# Patient Record
Sex: FEMALE | Race: WHITE | NOT HISPANIC OR LATINO | Employment: UNEMPLOYED | URBAN - METROPOLITAN AREA
[De-identification: names, ages, dates, MRNs, and addresses within clinical notes are randomized per-mention and may not be internally consistent; named-entity substitution may affect disease eponyms.]

---

## 2017-08-15 ENCOUNTER — ALLSCRIPTS OFFICE VISIT (OUTPATIENT)
Dept: OTHER | Facility: OTHER | Age: 22
End: 2017-08-15

## 2017-08-15 PROCEDURE — G0145 SCR C/V CYTO,THINLAYER,RESCR: HCPCS | Performed by: NURSE PRACTITIONER

## 2017-08-17 ENCOUNTER — GENERIC CONVERSION - ENCOUNTER (OUTPATIENT)
Dept: OTHER | Facility: OTHER | Age: 22
End: 2017-08-17

## 2017-08-17 ENCOUNTER — LAB REQUISITION (OUTPATIENT)
Dept: LAB | Facility: HOSPITAL | Age: 22
End: 2017-08-17
Payer: COMMERCIAL

## 2017-08-17 DIAGNOSIS — Z01.419 ENCOUNTER FOR GYNECOLOGICAL EXAMINATION WITHOUT ABNORMAL FINDING: ICD-10-CM

## 2017-08-17 DIAGNOSIS — Z11.3 ENCOUNTER FOR SCREENING FOR INFECTIONS WITH PREDOMINANTLY SEXUAL MODE OF TRANSMISSION: ICD-10-CM

## 2017-08-17 PROCEDURE — 87591 N.GONORRHOEAE DNA AMP PROB: CPT | Performed by: NURSE PRACTITIONER

## 2017-08-17 PROCEDURE — 87491 CHLMYD TRACH DNA AMP PROBE: CPT | Performed by: NURSE PRACTITIONER

## 2017-08-18 LAB
CHLAMYDIA DNA CVX QL NAA+PROBE: NORMAL
N GONORRHOEA DNA GENITAL QL NAA+PROBE: NORMAL

## 2017-08-23 LAB
LAB AP GYN PRIMARY INTERPRETATION: NORMAL
Lab: NORMAL
PATH INTERP SPEC-IMP: NORMAL

## 2017-08-24 ENCOUNTER — ALLSCRIPTS OFFICE VISIT (OUTPATIENT)
Dept: OTHER | Facility: OTHER | Age: 22
End: 2017-08-24

## 2017-10-23 NOTE — PROGRESS NOTES
Assessment  1  Encounter for gynecological examination with Papanicolaou smear of cervix (V72 31)   (Z01 419)    Plan  Birth control    · Junel FE 1/20 1-20 MG-MCG Oral Tablet; TAKE 1 TABLET DAILY   Rx By: Preethi Izquierdo; Dispense: 28 Days ; #:28 Tablet; Refill: 0;For: Birth control; DAPHNEY = N; Verified Transmission to [place] [address]; Last Updated By: System, [employer]; 8/15/2017 4:38:32 PM   · Junel FE 1/20 1-20 MG-MCG Oral Tablet; TAKE 1 TABLET DAILY AS DIRECTED   Rx By: Preethi Izquierdo; Dispense: 84 Days ; #:84 Tablet; Refill: 3;For: Birth control; DAPHNEY = N; Verified Transmission to [facility]; Last Updated By: System, [employer]; 8/15/2017 4:38:36 PM    Discussion/Summary  health maintenance visit Currently, she eats a healthy diet and has an adequate exercise regimen  the risks and benefits of cervical cancer screening were discussed Pap test with reflex HPV testing was done today Testing was done today for chlamydia and gonorrhea  Breast cancer screening: the risks and benefits of breast cancer screening were discussed and self breast exam technique was taught  Advice and education were given regarding aerobic exercise, weight bearing exercise, calcium supplements, vitamin D supplements and contraception  Patient discussion: discussed with the patient  Normal gyn exam  pap and g/c, c/t cultures sent  Given rx for Junel 1/20  Will call me in three months to see if wants to continue same ocp  To call sooner w any questions/concerns  rto yearly  The patient has the current Goals: Annual and ocp start  The patent has the current Barriers: None  Patient is able to Self-Care  Possible side effects of new medications were reviewed with the patient/guardian today  The treatment plan was reviewed with the patient/guardian   The patient/guardian understands and agrees with the treatment plan     Self Referrals: Yes      Chief Complaint  Yearly      History of Present Illness  HPI: 24 yo new pt here for annual exam  Used junel in the past and would like to restart it  No pelvic or breasts c/o  SH: was working in [employer], recently came back and going to [facility]   GYN HM, Adult [facility]: The patient is being seen for a gynecology evaluation  General Health:   Lifestyle:  She does not use tobacco --b0She consumes alcohol --b0She denies drug use  Reproductive health: the patient is premenopausalshe reports no menstrual problems  --b0she uses no contraception --b0she is sexually active  --b0she denies prior pregnancies  Screening:      Review of Systems  no pelvic pain,--b0no pelvic pressure,--b0no vaginal discharge,--b0no vaginal itching,--b0no nonmenstrual bleeding,--b0no vulvar pain,--b0no vulvar itching,--b0no dysmenorrhea,--[id number] length of periods,--b0no dysuriano urinary loss of control  Constitutional: No fever, no chills, feels well, no tiredness, no recent weight gain or loss  ENT: no ear ache, no loss of hearing, no nosebleeds or nasal discharge, no sore throat or hoarseness  Cardiovascular: no complaints of slow or fast heart rate, no chest pain, no palpitations, no leg claudication or lower extremity edema  Respiratory: no complaints of shortness of breath, no wheezing, no dyspnea on exertion, no orthopnea or PND  Breasts: no complaints of breast pain, breast lump or nipple discharge  Gastrointestinal: no complaints of abdominal pain, no constipation, no nausea or diarrhea, no vomiting, no bloody stools  Genitourinary: no complaints of dysuria, no incontinence, no pelvic pain, no dysmenorrhea, no vaginal discharge or abnormal vaginal bleeding  Musculoskeletal: no complaints of arthralgia, no myalgia, no joint swelling or stiffness, no limb pain or swelling  Integumentary: no complaints of skin rash or lesion, no itching or dry skin, no skin wounds     Neurological: no complaints of headache, no confusion, no numbness or tingling, no dizziness or fainting  ROS reviewed  OB History  Pregnancy History (Brief):   Prior pregnancies: : 0  Para: Active Problems  1  Acute pansinusitis (461 8) (J01 40)   2  Allergic dermatitis (692 9) (L23 9)   3  Allergy To Pollens (V15 09)   4  Asthma (493 90) (J45 909)   5  Hives (708 9) (L50 9)   6  Sore throat (462) (J02 9)    Past Medical History   · No pertinent past medical history   · History of No pertinent past surgical history    The active problems and past medical history were reviewed and updated today  Surgical History   · History Of Prior Surgery    The surgical history was reviewed and updated today  Family History  Mother    · History of allergy (V15 09) (Z88 9)    The family history was reviewed and updated today  Social History   · Never a smoker   · Never A Smoker   · Never Drank Alcohol  The social history was reviewed and updated today  The social history was reviewed and is unchanged  Current Meds   1  Loratadine 10 MG Oral Tablet; Take 1 tablet daily Recorded   2  Loratadine TABS; Therapy: (Recorded:[id number]) to Recorded   3  MethylPREDNISolone (Edvin) 4 MG TABS; TAKE AS DIRECTED; Therapy: 95SOY6174 to (Last KD:95TDH7933)  Requested for: 37OAV5725 Ordered   4  Montelukast Sodium 10 MG Oral Tablet; TAKE 1 TABLET BY MOUTH ONCE DAILY; Therapy: 78GKJ2481 to (Evaluate:01Zgs4811)  Requested for: 96URL3836; Last   QW:[id number] Ordered   5  Xopenex HFA 45 MCG/ACT Inhalation Aerosol; INHALE 1 TO 2 PUFFS EVERY 4 TO 6   HOURS AS NEEDED AND AS DIRECTED; Therapy: 48AKS4478 to (Evaluate:86Hsx7984)  Requested for: 95ZML1166; Last   Rx:[id number] Ordered    Allergies  1  No Known Drug Allergies  2  Apples   3  Seasonal   4  Shellfish    Vitals   Recorded: 02XEE4996 03:42UJ   Systolic 109   Diastolic 70   Weight 801 lb 5 oz   LMP [id number]     Physical Exam    Constitutional   General appearance: No acute distress, well appearing and well nourished      Neck Neck: Normal, supple, trachea midline, no masses  Thyroid: Normal, no thyromegaly  Pulmonary   Respiratory effort: No increased work of breathing or signs of respiratory distress  Auscultation of lungs: Clear to auscultation  Cardiovascular   Auscultation of heart: Normal rate and rhythm, normal S1 and S2, no murmurs  Genitourinary   External genitalia: Normal and no lesions appreciated  Vagina: Normal, no lesions or dryness appreciated  Urethra: Normal     Urethral meatus: Normal     Bladder: Normal, soft, non-tender and no prolapse or masses appreciated  Cervix: Normal, no palpable masses  Uterus: Normal, non-tender, not enlarged, and no palpable masses  Adnexa/parametria: Normal, non-tender and no fullness or masses appreciated  Chest   Breasts: Normal and no dimpling or skin changes noted  Abdomen   Abdomen: Normal, non-tender, and no organomegaly noted  Liver and spleen: No hepatomegaly or splenomegaly  Examination for hernias: No hernias appreciated  Lymphatic   Palpation of lymph nodes in neck, axillae, groin and/or other locations: No lymphadenopathy or masses noted  Skin   Skin and subcutaneous tissue: Normal skin turgor and no rashes  Palpation of skin and subcutaneous tissue: Normal     Psychiatric   Orientation to person, place, and time: Normal     Mood and affect: Normal        Signatures   Electronically signed by : PETE Pulido;  Aug 15 2017  4:44PM EST                       (Author)    Electronically signed by : MARGOTH Rocha ; Aug 17 2017  4:14PM EST

## 2018-01-12 VITALS — DIASTOLIC BLOOD PRESSURE: 70 MMHG | WEIGHT: 185.31 LBS | SYSTOLIC BLOOD PRESSURE: 118 MMHG

## 2018-01-12 VITALS
DIASTOLIC BLOOD PRESSURE: 66 MMHG | TEMPERATURE: 98.4 F | HEIGHT: 65 IN | BODY MASS INDEX: 30.18 KG/M2 | WEIGHT: 181.13 LBS | SYSTOLIC BLOOD PRESSURE: 122 MMHG

## 2018-01-23 ENCOUNTER — ALLSCRIPTS OFFICE VISIT (OUTPATIENT)
Dept: URGENT CARE | Facility: CLINIC | Age: 23
End: 2018-01-23
Payer: COMMERCIAL

## 2018-01-23 LAB
BILIRUB UR QL STRIP: NORMAL
CLARITY UR: NORMAL
COLOR UR: NORMAL
GLUCOSE (HISTORICAL): NORMAL
HGB UR QL STRIP.AUTO: NORMAL
KETONES UR STRIP-MCNC: NORMAL MG/DL
LEUKOCYTE ESTERASE UR QL STRIP: NORMAL
NITRITE UR QL STRIP: NORMAL
PH UR STRIP.AUTO: 6.5 [PH]
PROT UR STRIP-MCNC: 100 MG/DL
SP GR UR STRIP.AUTO: 1.02
UROBILINOGEN UR QL STRIP.AUTO: 0.2

## 2018-01-23 PROCEDURE — 99213 OFFICE O/P EST LOW 20 MIN: CPT

## 2018-02-20 ENCOUNTER — TELEPHONE (OUTPATIENT)
Dept: FAMILY MEDICINE CLINIC | Facility: CLINIC | Age: 23
End: 2018-02-20

## 2018-02-20 DIAGNOSIS — J02.0 ACUTE STREPTOCOCCAL PHARYNGITIS: Primary | ICD-10-CM

## 2018-02-20 RX ORDER — AMOXICILLIN 500 MG/1
1000 CAPSULE ORAL EVERY 12 HOURS SCHEDULED
Qty: 40 CAPSULE | Refills: 0 | Status: SHIPPED | OUTPATIENT
Start: 2018-02-20 | End: 2018-03-02

## 2018-04-04 ENCOUNTER — OFFICE VISIT (OUTPATIENT)
Dept: URGENT CARE | Facility: CLINIC | Age: 23
End: 2018-04-04
Payer: COMMERCIAL

## 2018-04-04 VITALS
BODY MASS INDEX: 33.46 KG/M2 | SYSTOLIC BLOOD PRESSURE: 110 MMHG | HEART RATE: 73 BPM | TEMPERATURE: 99.4 F | WEIGHT: 198 LBS | OXYGEN SATURATION: 98 % | DIASTOLIC BLOOD PRESSURE: 82 MMHG

## 2018-04-04 DIAGNOSIS — J06.9 ACUTE URI: Primary | ICD-10-CM

## 2018-04-04 DIAGNOSIS — R68.89 FLU-LIKE SYMPTOMS: ICD-10-CM

## 2018-04-04 PROCEDURE — 99213 OFFICE O/P EST LOW 20 MIN: CPT | Performed by: PHYSICIAN ASSISTANT

## 2018-04-04 RX ORDER — MONTELUKAST SODIUM 10 MG/1
1 TABLET ORAL DAILY
COMMUNITY
Start: 2014-05-02 | End: 2019-07-09 | Stop reason: ALTCHOICE

## 2018-04-04 RX ORDER — NORETHINDRONE ACETATE AND ETHINYL ESTRADIOL 1MG-20(21)
1 KIT ORAL DAILY
COMMUNITY
Start: 2017-08-15 | End: 2019-07-09 | Stop reason: ALTCHOICE

## 2018-04-04 NOTE — PATIENT INSTRUCTIONS
Infection appears viral   Recommend symptomatic treatment  Can take ibuprofen or tylenol as needed for pain or fever  Over the counter cough and cold medications to help with symptoms  Use salt water gargles for sore throat and throat lozenges  Cough drops as needed  Wash hands frequently to prevent the spread of infection  If not improving over the next 7-10 days, follow up with PCP  Symptoms may persist for 10-14 days  Drink plenty of water

## 2018-04-04 NOTE — LETTER
April 4, 2018     Patient: Jonah Best   YOB: 1995   Date of Visit: 4/4/2018       To Whom It May Concern: It is my medical opinion that Jonah Best can return to work when she no longer has a fever  If you have any questions or concerns, please don't hesitate to call           Sincerely,        Kameron Cai PA-C    CC: No Recipients

## 2018-04-04 NOTE — PROGRESS NOTES
[facility]    NAME: Nolberto Agudelo is a 26 y o  female  : 1995    MRN: [id number]  DATE: 2018  TIME: 3:30 PM    Assessment and Plan   Acute URI [J06 9]  1  Acute URI     2  Flu-like symptoms      Discussed risks and benefits of starting Tamiflu and decided not to start Tamiflu  Patient Instructions     Patient Instructions   Infection appears viral   Recommend symptomatic treatment  Can take ibuprofen or tylenol as needed for pain or fever  Over the counter cough and cold medications to help with symptoms  Use salt water gargles for sore throat and throat lozenges  Cough drops as needed  Wash hands frequently to prevent the spread of infection  If not improving over the next 7-10 days, follow up with PCP  Symptoms may persist for 10-14 days  Drink plenty of water  Chief Complaint     Chief Complaint   Patient presents with    Cold Like Symptoms     Started yesterday with fever headache sore throat ear pressure having dizziness also  no coughing  Is taking OTC medications with little relief  Mikey Piles LPN        History of Present Illness   56-year-old female here with flu-like symptoms for the last 2 days  Reports having a lot of sinus pressure and headaches  Has a really bad sore throat  No cough  Has had a fever as high as 104  Fever was reduced with taking ibuprofen  Patient denies any wheezing or shortness of breath  Review of Systems   Review of Systems   Constitutional: Positive for chills, fatigue and fever  Negative for activity change, appetite change, diaphoresis and unexpected weight change  HENT: Positive for congestion, sinus pressure and sore throat  Negative for dental problem, hearing loss, sneezing, tinnitus, trouble swallowing and voice change  Eyes: Negative for photophobia, redness and visual disturbance  Respiratory: Negative for apnea, cough, chest tightness, shortness of breath, wheezing and stridor      Cardiovascular: Negative for chest pain, palpitations and leg swelling  Gastrointestinal: Negative for abdominal distention, abdominal pain, blood in stool, constipation, diarrhea, nausea and vomiting  Endocrine: Negative for cold intolerance, heat intolerance, polydipsia, polyphagia and polyuria  Genitourinary: Negative for difficulty urinating, dysuria, flank pain, frequency, hematuria and urgency  Musculoskeletal: Negative for arthralgias, back pain, gait problem, joint swelling, myalgias, neck pain and neck stiffness  Skin: Negative for pallor, rash and wound  Neurological: Negative for dizziness, tremors, seizures, speech difficulty, weakness and headaches  Hematological: Negative for adenopathy  Does not bruise/bleed easily  Psychiatric/Behavioral: Negative for agitation, confusion, dysphoric mood and sleep disturbance  The patient is not nervous/anxious  All other systems reviewed and are negative  Current Medications     Current Outpatient Prescriptions:     montelukast (SINGULAIR) 10 mg tablet, Take 1 tablet by mouth daily, Disp: , Rfl:     norethindrone-ethinyl estradiol (JUNEL FE 1/20) 1-20 MG-MCG per tablet, Take 1 tablet by mouth daily, Disp: , Rfl:     Current Allergies     Allergies as of 04/04/2018 - Reviewed 04/04/2018   Allergen Reaction Noted    Apple  05/12/2014    Shellfish allergy  05/12/2014          The following portions of the patient's history were reviewed and updated as appropriate: allergies, current medications, past family history, past medical history, past social history, past surgical history and problem list    No past medical history on file  No past surgical history on file  No family history on file  Medications have been verified  Objective   /82   Pulse 73   Temp 99 4 °F (37 4 °C)   Wt 89 8 kg (198 lb)   SpO2 98%   BMI 33 46 kg/m²      Physical Exam   Physical Exam   Constitutional: She appears well-developed and well-nourished  No distress  HENT:   Head: Normocephalic  Right Ear: Tympanic membrane and external ear normal    Left Ear: Tympanic membrane and external ear normal    Nose: Mucosal edema present  Mouth/Throat: Posterior oropharyngeal erythema present  No oropharyngeal exudate  Neck: Normal range of motion  Neck supple  Cardiovascular: Normal rate, regular rhythm and normal heart sounds  No murmur heard  Pulmonary/Chest: Effort normal and breath sounds normal  No respiratory distress  She has no wheezes  She has no rales  Abdominal: Soft  Bowel sounds are normal  There is no tenderness  Musculoskeletal: Normal range of motion  Lymphadenopathy:     She has no cervical adenopathy  Skin: Skin is warm  No rash noted

## 2018-09-26 RX ORDER — NORETHINDRONE ACETATE AND ETHINYL ESTRADIOL 1MG-20(21)
KIT ORAL
Qty: 84 TABLET | Refills: 3 | OUTPATIENT
Start: 2018-09-26

## 2019-07-09 ENCOUNTER — OFFICE VISIT (OUTPATIENT)
Dept: FAMILY MEDICINE CLINIC | Facility: CLINIC | Age: 24
End: 2019-07-09
Payer: COMMERCIAL

## 2019-07-09 VITALS
HEIGHT: 65 IN | BODY MASS INDEX: 34.99 KG/M2 | SYSTOLIC BLOOD PRESSURE: 132 MMHG | DIASTOLIC BLOOD PRESSURE: 86 MMHG | WEIGHT: 210 LBS

## 2019-07-09 DIAGNOSIS — Z91.018 ALLERGY TO NUTS: Primary | ICD-10-CM

## 2019-07-09 PROCEDURE — 99213 OFFICE O/P EST LOW 20 MIN: CPT | Performed by: FAMILY MEDICINE

## 2019-07-09 PROCEDURE — 3008F BODY MASS INDEX DOCD: CPT | Performed by: FAMILY MEDICINE

## 2019-07-09 RX ORDER — EPINEPHRINE 0.3 MG/.3ML
0.3 INJECTION SUBCUTANEOUS ONCE
Qty: 0.6 ML | Refills: 5 | Status: SHIPPED | OUTPATIENT
Start: 2019-07-09 | End: 2021-12-22

## 2019-07-09 RX ORDER — PREDNISONE 20 MG/1
TABLET ORAL
Qty: 6 TABLET | Refills: 3 | Status: SHIPPED | OUTPATIENT
Start: 2019-07-09 | End: 2020-04-21 | Stop reason: ALTCHOICE

## 2019-07-09 RX ORDER — DIPHENHYDRAMINE HYDROCHLORIDE 12.5 MG/1
BAR, CHEWABLE ORAL
Qty: 15 TABLET | Refills: 3 | Status: SHIPPED | OUTPATIENT
Start: 2019-07-09

## 2019-07-09 NOTE — PROGRESS NOTES
Assessment/Plan:  Patient will be provided with an EpiPen also with on Benadryl and with a steroid rescue pack she will be referred to Allergy and immunology for testing patient was counseled that if this happens again she is not to stay home she is to immediately administer the rescue pack and go to the closest emergency room    Problem List Items Addressed This Visit     None      Visit Diagnoses     Allergy to nuts    -  Primary           Diagnoses and all orders for this visit:    Allergy to nuts        No problem-specific Assessment & Plan notes found for this encounter  Subjective:      Patient ID: Keiko Dorado is a 22 y o  female  Patient he was camping with her parents consumed all men's and peanuts and had episode of swelling of her lip tightness in her throat on took Benadryl on and did not seek immediate medical attention is here now for follow-up this is the 1st episode she has had related to a not allergy      The following portions of the patient's history were reviewed and updated as appropriate:   She has no past medical history on file  ,  does not have a problem list on file  ,   has no past surgical history on file  ,  family history includes No Known Problems in her mother  ,   reports that she has never smoked  She has never used smokeless tobacco  She reports that she drinks alcohol  She reports that she does not use drugs  ,  is allergic to other; apple; and shellfish allergy     No current outpatient medications on file  No current facility-administered medications for this visit  Review of Systems   Constitutional: Negative for activity change, appetite change, diaphoresis, fatigue and fever  HENT: Negative  Eyes: Negative  Respiratory: Negative for apnea, cough, chest tightness, shortness of breath and wheezing  Cardiovascular: Negative for chest pain, palpitations and leg swelling     Gastrointestinal: Negative for abdominal distention, abdominal pain, anal bleeding, constipation, diarrhea, nausea and vomiting  Endocrine: Negative for cold intolerance, heat intolerance, polydipsia, polyphagia and polyuria  Genitourinary: Negative for difficulty urinating, dysuria, flank pain, hematuria and urgency  Musculoskeletal: Negative for arthralgias, back pain, gait problem, joint swelling and myalgias  Skin: Negative for color change, rash and wound  Allergic/Immunologic: Negative for environmental allergies, food allergies and immunocompromised state  Patient had an episode of not allergy swelling of lip tightness in throat she has previously had issues with crush stations and with apples   Neurological: Negative for dizziness, seizures, syncope, speech difficulty, numbness and headaches  Hematological: Negative for adenopathy  Does not bruise/bleed easily  Psychiatric/Behavioral: Negative for agitation, behavioral problems, hallucinations, sleep disturbance and suicidal ideas  Objective:  Vitals:    07/09/19 1652   BP: 132/86   BP Location: Left arm   Patient Position: Sitting   Cuff Size: Standard   Weight: 95 3 kg (210 lb)   Height: 5' 4 5" (1 638 m)     Body mass index is 35 49 kg/m²  Physical Exam   Constitutional: She is oriented to person, place, and time  She appears well-developed and well-nourished  No distress  HENT:   Head: Normocephalic  Right Ear: External ear normal    Left Ear: External ear normal    Nose: Nose normal    Mouth/Throat: Oropharynx is clear and moist    Eyes: Pupils are equal, round, and reactive to light  Conjunctivae and EOM are normal  Right eye exhibits no discharge  Left eye exhibits no discharge  No scleral icterus  Neck: Normal range of motion  No tracheal deviation present  No thyromegaly present  Cardiovascular: Normal rate, regular rhythm and normal heart sounds  Exam reveals no gallop and no friction rub  No murmur heard    Pulmonary/Chest: Effort normal and breath sounds normal  No respiratory distress  She has no wheezes  Abdominal: Soft  Bowel sounds are normal  She exhibits no mass  There is no tenderness  There is no guarding  Musculoskeletal: She exhibits no edema or deformity  Lymphadenopathy:     She has no cervical adenopathy  Neurological: She is alert and oriented to person, place, and time  No cranial nerve deficit  Skin: Skin is warm and dry  No rash noted  She is not diaphoretic  No erythema  Psychiatric: She has a normal mood and affect   Thought content normal

## 2019-10-30 ENCOUNTER — OFFICE VISIT (OUTPATIENT)
Dept: FAMILY MEDICINE CLINIC | Facility: CLINIC | Age: 24
End: 2019-10-30
Payer: COMMERCIAL

## 2019-10-30 VITALS
BODY MASS INDEX: 34.16 KG/M2 | DIASTOLIC BLOOD PRESSURE: 88 MMHG | WEIGHT: 205 LBS | HEIGHT: 65 IN | SYSTOLIC BLOOD PRESSURE: 130 MMHG | TEMPERATURE: 99 F

## 2019-10-30 DIAGNOSIS — J30.9 CHRONIC ALLERGIC RHINITIS: ICD-10-CM

## 2019-10-30 DIAGNOSIS — J01.01 ACUTE RECURRENT MAXILLARY SINUSITIS: Primary | ICD-10-CM

## 2019-10-30 PROCEDURE — 99213 OFFICE O/P EST LOW 20 MIN: CPT | Performed by: FAMILY MEDICINE

## 2019-10-30 PROCEDURE — 3008F BODY MASS INDEX DOCD: CPT | Performed by: FAMILY MEDICINE

## 2019-10-30 RX ORDER — AMOXICILLIN AND CLAVULANATE POTASSIUM 875; 125 MG/1; MG/1
1 TABLET, FILM COATED ORAL EVERY 12 HOURS SCHEDULED
Qty: 20 TABLET | Refills: 0 | Status: SHIPPED | OUTPATIENT
Start: 2019-10-30 | End: 2019-11-09

## 2019-10-30 RX ORDER — METHYLPREDNISOLONE 4 MG/1
TABLET ORAL
Qty: 21 EACH | Refills: 0 | Status: SHIPPED | OUTPATIENT
Start: 2019-10-30 | End: 2020-04-21 | Stop reason: ALTCHOICE

## 2019-10-30 NOTE — LETTER
October 30, 2019     Patient: Armando Hernandez   YOB: 1995   Date of Visit: 10/30/2019       To Whom it May Concern:    Armando Hernandez is under my professional care  She was seen in my office on 10/30/2019  She may return to work on October 31, 2019  Patient was absent from work October 30th for illness and an office visit       If you have any questions or concerns, please don't hesitate to call           Sincerely,          Ariela Graf, DO        CC: No Recipients

## 2019-10-30 NOTE — PROGRESS NOTES
Assessment/Plan:  Patient denies pregnancy is actively menstruating therefore will give her Augmentin and has methylprednisolone Dosepak she will continue her Allegra and her Flonase nasal spray    Problem List Items Addressed This Visit     None      Visit Diagnoses     Acute recurrent maxillary sinusitis    -  Primary    Chronic allergic rhinitis               Diagnoses and all orders for this visit:    Acute recurrent maxillary sinusitis    Chronic allergic rhinitis        No problem-specific Assessment & Plan notes found for this encounter  Subjective:      Patient ID: Yissel Harris is a 22 y o  female  Evyissel Worthington is here with acute maxillary sinusitis superimposed upon chronic allergic rhinitis she has been remodeling their home she is there has been a lot of painting drywall standing there has been a lot of plastic drop clots which give often odor and all of these things have exacerbated her normal allergic rhinitis she now has congestion almost complete blockage of her nasal passages difficulty breathing through her nose and insomnia secondary to that she denies pregnancy and is actively menstrual      The following portions of the patient's history were reviewed and updated as appropriate:   She has a past medical history of Known health problems: none (05/12/2014)  ,  does not have a problem list on file  ,   has a past surgical history that includes No past surgeries  ,  family history includes Allergy (severe) in her mother  ,   reports that she has never smoked  She has never used smokeless tobacco  She reports that she drinks alcohol  She reports that she does not use drugs  ,  is allergic to nuts; other; soybean-containing drug products; apple; and shellfish allergy     Current Outpatient Medications   Medication Sig Dispense Refill    diphenhydrAMINE (BENADRYL) 12 5 MG chewable tablet Chew and swallow 4 tablets immediately for symptoms of anaphylaxis 15 tablet 3    EPINEPHrine (EPIPEN) 0 3 mg/0 3 mL SOAJ Inject 0 3 mL (0 3 mg total) into a muscle once for 1 dose 0 6 mL 5    predniSONE 20 mg tablet Take 3 tablets immediately for signs of anaphylaxis 6 tablet 3     No current facility-administered medications for this visit  Review of Systems   Constitutional: Negative for activity change, appetite change, diaphoresis, fatigue and fever  HENT: Positive for postnasal drip, rhinorrhea, sinus pressure, sinus pain, sneezing and sore throat  Eyes: Negative  Respiratory: Negative for apnea, cough, chest tightness, shortness of breath and wheezing  Cardiovascular: Negative for chest pain, palpitations and leg swelling  Gastrointestinal: Negative for abdominal distention, abdominal pain, anal bleeding, constipation, diarrhea, nausea and vomiting  Endocrine: Negative for cold intolerance, heat intolerance, polydipsia, polyphagia and polyuria  Genitourinary: Negative for difficulty urinating, dysuria, flank pain, hematuria and urgency  Musculoskeletal: Negative for arthralgias, back pain, gait problem, joint swelling and myalgias  Skin: Negative for color change, rash and wound  Allergic/Immunologic: Negative for environmental allergies, food allergies and immunocompromised state  Neurological: Negative for dizziness, seizures, syncope, speech difficulty, numbness and headaches  Hematological: Negative for adenopathy  Does not bruise/bleed easily  Psychiatric/Behavioral: Negative for agitation, behavioral problems, hallucinations, sleep disturbance and suicidal ideas  Objective:  Vitals:    10/30/19 1027   BP: 130/88   BP Location: Left arm   Patient Position: Sitting   Cuff Size: Standard   Temp: 99 °F (37 2 °C)   TempSrc: Tympanic   Weight: 93 kg (205 lb)   Height: 5' 4 5" (1 638 m)     Body mass index is 34 64 kg/m²  Physical Exam   Constitutional: She is oriented to person, place, and time  She appears well-developed and well-nourished  No distress     HENT:   Head: Normocephalic  Right Ear: External ear normal    Left Ear: External ear normal    Mouth/Throat: Oropharyngeal exudate present  Eyes: Pupils are equal, round, and reactive to light  Conjunctivae and EOM are normal  Right eye exhibits no discharge  Left eye exhibits no discharge  No scleral icterus  Neck: Normal range of motion  No tracheal deviation present  No thyromegaly present  Cardiovascular: Normal rate, regular rhythm and normal heart sounds  Exam reveals no gallop and no friction rub  No murmur heard  Pulmonary/Chest: Effort normal and breath sounds normal  No respiratory distress  She has no wheezes  Abdominal: Soft  Bowel sounds are normal  She exhibits no mass  There is no tenderness  There is no guarding  Musculoskeletal: She exhibits no edema or deformity  Lymphadenopathy:     She has no cervical adenopathy  Neurological: She is alert and oriented to person, place, and time  No cranial nerve deficit  Skin: Skin is warm and dry  No rash noted  She is not diaphoretic  No erythema  Psychiatric: She has a normal mood and affect   Thought content normal

## 2020-01-21 ENCOUNTER — TELEPHONE (OUTPATIENT)
Dept: FAMILY MEDICINE CLINIC | Facility: CLINIC | Age: 25
End: 2020-01-21

## 2020-01-21 DIAGNOSIS — T75.3XXA MOTION SICKNESS, INITIAL ENCOUNTER: Primary | ICD-10-CM

## 2020-01-21 RX ORDER — SCOLOPAMINE TRANSDERMAL SYSTEM 1 MG/1
PATCH, EXTENDED RELEASE TRANSDERMAL
Qty: 4 PATCH | Refills: 0 | Status: SHIPPED | OUTPATIENT
Start: 2020-01-21 | End: 2020-04-21 | Stop reason: ALTCHOICE

## 2020-04-17 ENCOUNTER — TELEMEDICINE (OUTPATIENT)
Dept: FAMILY MEDICINE CLINIC | Facility: CLINIC | Age: 25
End: 2020-04-17
Payer: COMMERCIAL

## 2020-04-17 VITALS — BODY MASS INDEX: 32.82 KG/M2 | HEIGHT: 65 IN | WEIGHT: 197 LBS

## 2020-04-17 DIAGNOSIS — J45.40 MODERATE PERSISTENT ASTHMA, UNSPECIFIED WHETHER COMPLICATED: Primary | ICD-10-CM

## 2020-04-17 PROCEDURE — 99213 OFFICE O/P EST LOW 20 MIN: CPT | Performed by: PHYSICIAN ASSISTANT

## 2020-04-17 RX ORDER — ALBUTEROL SULFATE 90 UG/1
2 AEROSOL, METERED RESPIRATORY (INHALATION) EVERY 6 HOURS PRN
Qty: 1 INHALER | Refills: 2 | Status: SHIPPED | OUTPATIENT
Start: 2020-04-17 | End: 2020-06-23 | Stop reason: SDUPTHER

## 2020-04-20 ENCOUNTER — TELEPHONE (OUTPATIENT)
Dept: FAMILY MEDICINE CLINIC | Facility: CLINIC | Age: 25
End: 2020-04-20

## 2020-04-20 DIAGNOSIS — Z32.01 POSITIVE URINE PREGNANCY TEST: Primary | ICD-10-CM

## 2020-04-21 ENCOUNTER — TELEMEDICINE (OUTPATIENT)
Dept: FAMILY MEDICINE CLINIC | Facility: CLINIC | Age: 25
End: 2020-04-21
Payer: COMMERCIAL

## 2020-04-21 ENCOUNTER — TELEPHONE (OUTPATIENT)
Dept: FAMILY MEDICINE CLINIC | Facility: CLINIC | Age: 25
End: 2020-04-21

## 2020-04-21 VITALS — BODY MASS INDEX: 33.32 KG/M2 | WEIGHT: 200 LBS | HEIGHT: 65 IN

## 2020-04-21 DIAGNOSIS — Z34.91 FIRST TRIMESTER PREGNANCY: ICD-10-CM

## 2020-04-21 DIAGNOSIS — J45.40 MODERATE PERSISTENT ASTHMA, UNSPECIFIED WHETHER COMPLICATED: Primary | ICD-10-CM

## 2020-04-21 PROCEDURE — 99214 OFFICE O/P EST MOD 30 MIN: CPT | Performed by: FAMILY MEDICINE

## 2020-04-21 PROCEDURE — 3008F BODY MASS INDEX DOCD: CPT | Performed by: FAMILY MEDICINE

## 2020-04-21 RX ORDER — INHALER, ASSIST DEVICES
SPACER (EA) MISCELLANEOUS
Qty: 1 EACH | Refills: 1 | Status: SHIPPED | OUTPATIENT
Start: 2020-04-21 | End: 2021-12-22 | Stop reason: ALTCHOICE

## 2020-05-04 ENCOUNTER — LAB (OUTPATIENT)
Dept: LAB | Facility: MEDICAL CENTER | Age: 25
End: 2020-05-04
Payer: COMMERCIAL

## 2020-05-04 DIAGNOSIS — Z32.01 POSITIVE URINE PREGNANCY TEST: ICD-10-CM

## 2020-05-04 LAB
ABO GROUP BLD: NORMAL
B-HCG SERPL-ACNC: ABNORMAL MIU/ML
BLD GP AB SCN SERPL QL: NEGATIVE
PROGEST SERPL-MCNC: 12.9 NG/ML
RH BLD: POSITIVE
SPECIMEN EXPIRATION DATE: NORMAL

## 2020-05-04 PROCEDURE — 84702 CHORIONIC GONADOTROPIN TEST: CPT

## 2020-05-04 PROCEDURE — 86850 RBC ANTIBODY SCREEN: CPT

## 2020-05-04 PROCEDURE — 86900 BLOOD TYPING SEROLOGIC ABO: CPT

## 2020-05-04 PROCEDURE — 36415 COLL VENOUS BLD VENIPUNCTURE: CPT

## 2020-05-04 PROCEDURE — 86901 BLOOD TYPING SEROLOGIC RH(D): CPT

## 2020-05-04 PROCEDURE — 84144 ASSAY OF PROGESTERONE: CPT

## 2020-05-06 ENCOUNTER — TELEPHONE (OUTPATIENT)
Dept: PERINATAL CARE | Facility: CLINIC | Age: 25
End: 2020-05-06

## 2020-05-06 ENCOUNTER — TELEPHONE (OUTPATIENT)
Dept: OBGYN CLINIC | Facility: MEDICAL CENTER | Age: 25
End: 2020-05-06

## 2020-05-06 DIAGNOSIS — Z34.91 ENCOUNTER FOR PREGNANCY RELATED EXAMINATION IN FIRST TRIMESTER: Primary | ICD-10-CM

## 2020-05-06 DIAGNOSIS — O26.891 PREGNANCY RELATED HIP PAIN IN FIRST TRIMESTER, ANTEPARTUM: ICD-10-CM

## 2020-05-06 DIAGNOSIS — M25.559 PREGNANCY RELATED HIP PAIN IN FIRST TRIMESTER, ANTEPARTUM: ICD-10-CM

## 2020-05-15 ENCOUNTER — APPOINTMENT (OUTPATIENT)
Dept: LAB | Facility: MEDICAL CENTER | Age: 25
End: 2020-05-15
Payer: COMMERCIAL

## 2020-05-15 ENCOUNTER — TRANSCRIBE ORDERS (OUTPATIENT)
Dept: ADMINISTRATIVE | Facility: HOSPITAL | Age: 25
End: 2020-05-15

## 2020-05-15 DIAGNOSIS — Z3A.08 8 WEEKS GESTATION OF PREGNANCY: Primary | ICD-10-CM

## 2020-05-15 DIAGNOSIS — Z3A.08 8 WEEKS GESTATION OF PREGNANCY: ICD-10-CM

## 2020-05-15 LAB
25(OH)D3 SERPL-MCNC: 14.8 NG/ML (ref 30–100)
ABO GROUP BLD: NORMAL
BACTERIA UR QL AUTO: ABNORMAL /HPF
BASOPHILS # BLD AUTO: 0.04 THOUSANDS/ΜL (ref 0–0.1)
BASOPHILS NFR BLD AUTO: 1 % (ref 0–1)
BILIRUB UR QL STRIP: NEGATIVE
BLD GP AB SCN SERPL QL: NEGATIVE
CLARITY UR: CLEAR
COLOR UR: YELLOW
EOSINOPHIL # BLD AUTO: 0.16 THOUSAND/ΜL (ref 0–0.61)
EOSINOPHIL NFR BLD AUTO: 2 % (ref 0–6)
ERYTHROCYTE [DISTWIDTH] IN BLOOD BY AUTOMATED COUNT: 12.1 % (ref 11.6–15.1)
GLUCOSE UR STRIP-MCNC: NEGATIVE MG/DL
HCT VFR BLD AUTO: 36.4 % (ref 34.8–46.1)
HGB BLD-MCNC: 12.3 G/DL (ref 11.5–15.4)
HGB UR QL STRIP.AUTO: NEGATIVE
HYALINE CASTS #/AREA URNS LPF: ABNORMAL /LPF
IMM GRANULOCYTES # BLD AUTO: 0.02 THOUSAND/UL (ref 0–0.2)
IMM GRANULOCYTES NFR BLD AUTO: 0 % (ref 0–2)
KETONES UR STRIP-MCNC: NEGATIVE MG/DL
LEUKOCYTE ESTERASE UR QL STRIP: NEGATIVE
LYMPHOCYTES # BLD AUTO: 1.44 THOUSANDS/ΜL (ref 0.6–4.47)
LYMPHOCYTES NFR BLD AUTO: 21 % (ref 14–44)
MCH RBC QN AUTO: 31.1 PG (ref 26.8–34.3)
MCHC RBC AUTO-ENTMCNC: 33.8 G/DL (ref 31.4–37.4)
MCV RBC AUTO: 92 FL (ref 82–98)
MONOCYTES # BLD AUTO: 0.49 THOUSAND/ΜL (ref 0.17–1.22)
MONOCYTES NFR BLD AUTO: 7 % (ref 4–12)
NEUTROPHILS # BLD AUTO: 4.83 THOUSANDS/ΜL (ref 1.85–7.62)
NEUTS SEG NFR BLD AUTO: 69 % (ref 43–75)
NITRITE UR QL STRIP: NEGATIVE
NON-SQ EPI CELLS URNS QL MICRO: ABNORMAL /HPF
NRBC BLD AUTO-RTO: 0 /100 WBCS
PH UR STRIP.AUTO: 6.5 [PH]
PLATELET # BLD AUTO: 257 THOUSANDS/UL (ref 149–390)
PMV BLD AUTO: 10 FL (ref 8.9–12.7)
PROT UR STRIP-MCNC: NEGATIVE MG/DL
RBC # BLD AUTO: 3.95 MILLION/UL (ref 3.81–5.12)
RBC #/AREA URNS AUTO: ABNORMAL /HPF
RH BLD: POSITIVE
SP GR UR STRIP.AUTO: 1.01 (ref 1–1.03)
SPECIMEN EXPIRATION DATE: NORMAL
UROBILINOGEN UR QL STRIP.AUTO: 0.2 E.U./DL
WBC # BLD AUTO: 6.98 THOUSAND/UL (ref 4.31–10.16)
WBC #/AREA URNS AUTO: ABNORMAL /HPF

## 2020-05-15 PROCEDURE — 36415 COLL VENOUS BLD VENIPUNCTURE: CPT

## 2020-05-15 PROCEDURE — 80081 OBSTETRIC PANEL INC HIV TSTG: CPT

## 2020-05-15 PROCEDURE — 86706 HEP B SURFACE ANTIBODY: CPT

## 2020-05-15 PROCEDURE — 87086 URINE CULTURE/COLONY COUNT: CPT

## 2020-05-15 PROCEDURE — 81001 URINALYSIS AUTO W/SCOPE: CPT | Performed by: OBSTETRICS & GYNECOLOGY

## 2020-05-15 PROCEDURE — 82306 VITAMIN D 25 HYDROXY: CPT

## 2020-05-15 PROCEDURE — 86803 HEPATITIS C AB TEST: CPT

## 2020-05-16 LAB
BACTERIA UR CULT: NORMAL
HBV SURFACE AB SER-ACNC: 62.31 MIU/ML
HBV SURFACE AG SER QL: NORMAL
HCV AB SER QL: NORMAL
RUBV IGG SERPL IA-ACNC: 124.5 IU/ML

## 2020-05-17 LAB — HIV 1+2 AB+HIV1 P24 AG SERPL QL IA: NORMAL

## 2020-05-18 LAB — RPR SER QL: NORMAL

## 2020-06-23 ENCOUNTER — TELEPHONE (OUTPATIENT)
Dept: FAMILY MEDICINE CLINIC | Facility: CLINIC | Age: 25
End: 2020-06-23

## 2020-06-23 DIAGNOSIS — J45.40 MODERATE PERSISTENT ASTHMA, UNSPECIFIED WHETHER COMPLICATED: ICD-10-CM

## 2020-06-23 RX ORDER — ALBUTEROL SULFATE 90 UG/1
2 AEROSOL, METERED RESPIRATORY (INHALATION) EVERY 6 HOURS PRN
Qty: 1 INHALER | Refills: 0 | Status: SHIPPED | OUTPATIENT
Start: 2020-06-23 | End: 2021-11-04 | Stop reason: SDUPTHER

## 2021-03-10 DIAGNOSIS — Z23 ENCOUNTER FOR IMMUNIZATION: ICD-10-CM

## 2021-10-27 ENCOUNTER — TELEPHONE (OUTPATIENT)
Dept: FAMILY MEDICINE CLINIC | Facility: CLINIC | Age: 26
End: 2021-10-27

## 2021-10-27 DIAGNOSIS — Z20.822 EXPOSURE TO COVID-19 VIRUS: Primary | ICD-10-CM

## 2021-10-27 PROCEDURE — U0003 INFECTIOUS AGENT DETECTION BY NUCLEIC ACID (DNA OR RNA); SEVERE ACUTE RESPIRATORY SYNDROME CORONAVIRUS 2 (SARS-COV-2) (CORONAVIRUS DISEASE [COVID-19]), AMPLIFIED PROBE TECHNIQUE, MAKING USE OF HIGH THROUGHPUT TECHNOLOGIES AS DESCRIBED BY CMS-2020-01-R: HCPCS | Performed by: FAMILY MEDICINE

## 2021-10-27 PROCEDURE — U0005 INFEC AGEN DETEC AMPLI PROBE: HCPCS | Performed by: FAMILY MEDICINE

## 2021-11-02 ENCOUNTER — TELEPHONE (OUTPATIENT)
Dept: FAMILY MEDICINE CLINIC | Facility: CLINIC | Age: 26
End: 2021-11-02

## 2021-11-02 DIAGNOSIS — B34.9 VIRAL INFECTION, UNSPECIFIED: ICD-10-CM

## 2021-11-02 DIAGNOSIS — Z20.822 EXPOSURE TO COVID-19 VIRUS: ICD-10-CM

## 2021-11-02 PROCEDURE — U0005 INFEC AGEN DETEC AMPLI PROBE: HCPCS | Performed by: FAMILY MEDICINE

## 2021-11-02 PROCEDURE — U0003 INFECTIOUS AGENT DETECTION BY NUCLEIC ACID (DNA OR RNA); SEVERE ACUTE RESPIRATORY SYNDROME CORONAVIRUS 2 (SARS-COV-2) (CORONAVIRUS DISEASE [COVID-19]), AMPLIFIED PROBE TECHNIQUE, MAKING USE OF HIGH THROUGHPUT TECHNOLOGIES AS DESCRIBED BY CMS-2020-01-R: HCPCS | Performed by: FAMILY MEDICINE

## 2021-11-03 ENCOUNTER — TELEPHONE (OUTPATIENT)
Dept: FAMILY MEDICINE CLINIC | Facility: CLINIC | Age: 26
End: 2021-11-03

## 2021-11-04 DIAGNOSIS — J45.40 MODERATE PERSISTENT ASTHMA, UNSPECIFIED WHETHER COMPLICATED: ICD-10-CM

## 2021-11-04 RX ORDER — ALBUTEROL SULFATE 90 UG/1
2 AEROSOL, METERED RESPIRATORY (INHALATION) EVERY 6 HOURS PRN
Qty: 8 G | Refills: 2 | Status: SHIPPED | OUTPATIENT
Start: 2021-11-04 | End: 2021-12-22

## 2021-11-17 ENCOUNTER — OFFICE VISIT (OUTPATIENT)
Dept: FAMILY MEDICINE CLINIC | Facility: CLINIC | Age: 26
End: 2021-11-17
Payer: COMMERCIAL

## 2021-11-17 VITALS
OXYGEN SATURATION: 99 % | WEIGHT: 227.6 LBS | HEART RATE: 74 BPM | TEMPERATURE: 97.2 F | SYSTOLIC BLOOD PRESSURE: 140 MMHG | HEIGHT: 65 IN | DIASTOLIC BLOOD PRESSURE: 82 MMHG | BODY MASS INDEX: 37.92 KG/M2

## 2021-11-17 DIAGNOSIS — F41.1 GENERALIZED ANXIETY DISORDER: Primary | ICD-10-CM

## 2021-11-17 PROBLEM — Q74.2: Status: ACTIVE | Noted: 2020-12-01

## 2021-11-17 PROCEDURE — 99214 OFFICE O/P EST MOD 30 MIN: CPT | Performed by: PHYSICIAN ASSISTANT

## 2021-11-17 PROCEDURE — 3008F BODY MASS INDEX DOCD: CPT | Performed by: PHYSICIAN ASSISTANT

## 2021-11-17 PROCEDURE — 1036F TOBACCO NON-USER: CPT | Performed by: PHYSICIAN ASSISTANT

## 2021-11-17 PROCEDURE — 3725F SCREEN DEPRESSION PERFORMED: CPT | Performed by: PHYSICIAN ASSISTANT

## 2021-11-17 RX ORDER — SERTRALINE HYDROCHLORIDE 25 MG/1
25 TABLET, FILM COATED ORAL DAILY
Qty: 30 TABLET | Refills: 1 | Status: SHIPPED | OUTPATIENT
Start: 2021-11-17 | End: 2021-12-22 | Stop reason: SDUPTHER

## 2021-11-27 ENCOUNTER — HOSPITAL ENCOUNTER (EMERGENCY)
Facility: HOSPITAL | Age: 26
Discharge: HOME | End: 2021-11-27
Attending: EMERGENCY MEDICINE | Admitting: EMERGENCY MEDICINE
Payer: COMMERCIAL

## 2021-11-27 VITALS
HEART RATE: 77 BPM | RESPIRATION RATE: 20 BRPM | WEIGHT: 228.4 LBS | TEMPERATURE: 97 F | OXYGEN SATURATION: 95 % | BODY MASS INDEX: 38.01 KG/M2

## 2021-11-27 DIAGNOSIS — R21 RASH AND NONSPECIFIC SKIN ERUPTION: Primary | ICD-10-CM

## 2021-11-27 PROCEDURE — 99284 EMERGENCY DEPT VISIT MOD MDM: CPT

## 2021-11-27 PROCEDURE — 99284 EMERGENCY DEPT VISIT MOD MDM: CPT | Performed by: EMERGENCY MEDICINE

## 2021-11-27 RX ORDER — CEPHALEXIN 500 MG/1
500 CAPSULE ORAL EVERY 8 HOURS SCHEDULED
Qty: 15 CAPSULE | Refills: 0 | Status: SHIPPED | OUTPATIENT
Start: 2021-11-27 | End: 2021-12-02

## 2021-11-27 RX ORDER — CEPHALEXIN 250 MG/1
500 CAPSULE ORAL ONCE
Status: COMPLETED | OUTPATIENT
Start: 2021-11-27 | End: 2021-11-27

## 2021-11-27 RX ADMIN — CEPHALEXIN 500 MG: 250 CAPSULE ORAL at 23:16

## 2021-11-29 ENCOUNTER — HOSPITAL ENCOUNTER (OUTPATIENT)
Dept: NON INVASIVE DIAGNOSTICS | Facility: HOSPITAL | Age: 26
Discharge: HOME | End: 2021-11-29
Payer: COMMERCIAL

## 2021-11-29 DIAGNOSIS — R21 RASH AND NONSPECIFIC SKIN ERUPTION: ICD-10-CM

## 2021-11-29 PROCEDURE — 93971 EXTREMITY STUDY: CPT | Performed by: SURGERY

## 2021-11-29 PROCEDURE — 93971 EXTREMITY STUDY: CPT

## 2021-12-10 ENCOUNTER — RA CDI HCC (OUTPATIENT)
Dept: OTHER | Facility: HOSPITAL | Age: 26
End: 2021-12-10

## 2021-12-22 ENCOUNTER — TELEMEDICINE (OUTPATIENT)
Dept: FAMILY MEDICINE CLINIC | Facility: CLINIC | Age: 26
End: 2021-12-22
Payer: COMMERCIAL

## 2021-12-22 VITALS — WEIGHT: 228 LBS | TEMPERATURE: 98.7 F | BODY MASS INDEX: 37.99 KG/M2 | HEIGHT: 65 IN

## 2021-12-22 DIAGNOSIS — J40 BRONCHITIS: ICD-10-CM

## 2021-12-22 DIAGNOSIS — F41.1 GENERALIZED ANXIETY DISORDER: Primary | ICD-10-CM

## 2021-12-22 PROCEDURE — 99214 OFFICE O/P EST MOD 30 MIN: CPT | Performed by: PHYSICIAN ASSISTANT

## 2021-12-22 PROCEDURE — 3008F BODY MASS INDEX DOCD: CPT | Performed by: PHYSICIAN ASSISTANT

## 2021-12-22 PROCEDURE — 1036F TOBACCO NON-USER: CPT | Performed by: PHYSICIAN ASSISTANT

## 2021-12-22 RX ORDER — AZITHROMYCIN 250 MG/1
TABLET, FILM COATED ORAL
Qty: 6 TABLET | Refills: 0 | Status: SHIPPED | OUTPATIENT
Start: 2021-12-22 | End: 2021-12-26

## 2022-02-04 ENCOUNTER — RA CDI HCC (OUTPATIENT)
Dept: OTHER | Facility: HOSPITAL | Age: 27
End: 2022-02-04

## 2022-02-04 NOTE — PROGRESS NOTES
[facility]len[facility] 75  coding opportunities       Chart reviewed, no opportunity found: CHART REVIEWED, NO OPPORTUNITY FOUND                        Patients insurance company: [employer] (Medicare Advantage and Commercial)

## 2022-03-16 ENCOUNTER — OFFICE VISIT (OUTPATIENT)
Dept: FAMILY MEDICINE CLINIC | Facility: CLINIC | Age: 27
End: 2022-03-16
Payer: COMMERCIAL

## 2022-03-16 VITALS
TEMPERATURE: 98.9 F | SYSTOLIC BLOOD PRESSURE: 126 MMHG | HEART RATE: 101 BPM | DIASTOLIC BLOOD PRESSURE: 74 MMHG | WEIGHT: 233.6 LBS | HEIGHT: 65 IN | OXYGEN SATURATION: 98 % | BODY MASS INDEX: 38.92 KG/M2

## 2022-03-16 DIAGNOSIS — R68.82 DECREASED SEX DRIVE: ICD-10-CM

## 2022-03-16 DIAGNOSIS — R63.5 WEIGHT GAIN: Primary | ICD-10-CM

## 2022-03-16 DIAGNOSIS — F41.1 GENERALIZED ANXIETY DISORDER: ICD-10-CM

## 2022-03-16 PROCEDURE — 99214 OFFICE O/P EST MOD 30 MIN: CPT | Performed by: PHYSICIAN ASSISTANT

## 2022-03-16 NOTE — PROGRESS NOTES
Assessment/Plan:    Problem List Items Addressed This Visit     None      Visit Diagnoses     Weight gain    -  Primary    Relevant Orders    CBC and differential    Comprehensive metabolic panel    TSH, 3rd generation with Free T4 reflex    Generalized anxiety disorder        Relevant Medications    sertraline (ZOLOFT) 50 mg tablet    Decreased sex drive               Diagnoses and all orders for this visit:    Weight gain  -     CBC and differential; Future  -     Comprehensive metabolic panel; Future  -     TSH, 3rd generation with Free T4 reflex; Future    Generalized anxiety disorder  -     sertraline (ZOLOFT) 50 mg tablet; Take 1 tablet (50 mg total) by mouth daily    Decreased sex drive        Will order labs  She will also get labs completed by OB/GYN  Explained that decreased sex drive and weight gain can be side effects for zoloft  She does not wish to discontinue at this time because she does feel it is helping with her anxiety  Subjective:      Patient ID: Jonah Vidales is a 33 y o  female  Sandy Oneil is a pleasant 33year old female who is here today for a follow-up  She is concerned about her weight  She has been gaining weight despite watching her diet and exercising  Thyroid problems do run in her family  She saw her GYN yesterday with similar concerns because she is still having nipple drainage 4 months after finishing breast feeding  She also had decreased sex drive  They ordered labs for her, and advised her that the lactation is normal and may occur for up to a year  She also needs a refill on her zoloft  She feels that this medication is working well for her and does not wish to change  These issues were occurring prior to starting the zoloft in November  The following portions of the patient's history were reviewed and updated as appropriate:   She has a past medical history of Known health problems: none (05/12/2014)  ,  does not have any pertinent problems on file  ,   has a past surgical history that includes No past surgeries and  section  ,  family history includes Allergy (severe) in her mother  ,   reports that she has never smoked  She has never used smokeless tobacco  She reports previous alcohol use  She reports that she does not use drugs  ,  is 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, other, soybean-containing drug products - food allergy, apple - food allergy, shellfish allergy - food allergy, short ragweed pollen ext, tree extract, and uncaria tomentosa (cats claw)     Current Outpatient Medications   Medication Sig Dispense Refill    sertraline (ZOLOFT) 50 mg tablet Take 1 tablet (50 mg total) by mouth daily 90 tablet 1    diphenhydrAMINE (BENADRYL) 12 5 MG chewable tablet Chew and swallow 4 tablets immediately for symptoms of anaphylaxis (Patient not taking: Reported on 3/16/2022 ) 15 tablet 3    EPINEPHrine (EPIPEN) 0 3 mg/0 3 mL SOAJ Inject 0 3 mL (0 3 mg total) into a muscle once for 1 dose 0 6 mL 5    Prenatal Vit-Fe Fumarate-FA (PRENATAL 1 PLUS 1 PO) Take 1 tablet by mouth daily   (Patient not taking: Reported on 3/16/2022 )       No current facility-administered medications for this visit  Review of Systems   Constitutional: Positive for unexpected weight change  Negative for chills, diaphoresis, fatigue and fever  HENT: Negative for congestion, ear pain, postnasal drip, rhinorrhea, sneezing, sore throat and trouble swallowing  Eyes: Negative for pain and visual disturbance  Respiratory: Negative for apnea, cough, shortness of breath and wheezing  Cardiovascular: Negative for chest pain and palpitations  Gastrointestinal: Negative for abdominal pain, constipation, diarrhea, nausea and vomiting  Endocrine:        +Lactating   Genitourinary: Negative for dysuria and hematuria  Decreased sex drive   Musculoskeletal: Negative for arthralgias, gait problem and myalgias     Neurological: Negative for dizziness, syncope, weakness, light-headedness, numbness and headaches  Psychiatric/Behavioral: Negative for suicidal ideas  The patient is not nervous/anxious  Objective:  Vitals:    03/16/22 1455   BP: 126/74   Pulse: 101   Temp: 98 9 °F (37 2 °C)   SpO2: 98%   Weight: 106 kg (233 lb 9 6 oz)   Height: 5' 5" (1 651 m)     Body mass index is 38 87 kg/m²  Physical Exam  Vitals and nursing note reviewed  Constitutional:       Appearance: She is well-developed  HENT:      Head: Normocephalic and atraumatic  Right Ear: External ear normal       Left Ear: External ear normal       Nose: Nose normal       Mouth/Throat:      Pharynx: No oropharyngeal exudate or posterior oropharyngeal erythema  Eyes:      Pupils: Pupils are equal, round, and reactive to light  Cardiovascular:      Rate and Rhythm: Normal rate and regular rhythm  Heart sounds: Normal heart sounds  No murmur heard  No friction rub  No gallop  Pulmonary:      Effort: Pulmonary effort is normal  No respiratory distress  Breath sounds: Normal breath sounds  No wheezing or rales  Musculoskeletal:         General: Normal range of motion  Cervical back: Normal range of motion and neck supple  Lymphadenopathy:      Cervical: No cervical adenopathy  Skin:     General: Skin is warm and dry  Neurological:      Mental Status: She is alert and oriented to person, place, and time  Psychiatric:         Mood and Affect: Mood is not anxious or depressed  Behavior: Behavior normal          Thought Content: Thought content normal  Thought content does not include homicidal or suicidal ideation  Thought content does not include homicidal or suicidal plan           Judgment: Judgment normal

## 2022-03-21 ENCOUNTER — OFFICE VISIT (OUTPATIENT)
Dept: FAMILY MEDICINE CLINIC | Facility: CLINIC | Age: 27
End: 2022-03-21
Payer: COMMERCIAL

## 2022-03-21 VITALS
OXYGEN SATURATION: 97 % | WEIGHT: 233 LBS | DIASTOLIC BLOOD PRESSURE: 70 MMHG | TEMPERATURE: 97.8 F | HEIGHT: 65 IN | BODY MASS INDEX: 38.82 KG/M2 | SYSTOLIC BLOOD PRESSURE: 124 MMHG | HEART RATE: 87 BPM

## 2022-03-21 DIAGNOSIS — J06.9 UPPER RESPIRATORY TRACT INFECTION, UNSPECIFIED TYPE: Primary | ICD-10-CM

## 2022-03-21 PROCEDURE — 99214 OFFICE O/P EST MOD 30 MIN: CPT | Performed by: PHYSICIAN ASSISTANT

## 2022-03-21 PROCEDURE — 1036F TOBACCO NON-USER: CPT | Performed by: PHYSICIAN ASSISTANT

## 2022-03-21 PROCEDURE — 3008F BODY MASS INDEX DOCD: CPT | Performed by: PHYSICIAN ASSISTANT

## 2022-03-21 RX ORDER — BENZONATATE 100 MG/1
100 CAPSULE ORAL 3 TIMES DAILY PRN
Qty: 20 CAPSULE | Refills: 0 | Status: SHIPPED | OUTPATIENT
Start: 2022-03-21

## 2022-03-21 RX ORDER — AZITHROMYCIN 250 MG/1
TABLET, FILM COATED ORAL
Qty: 6 TABLET | Refills: 0 | Status: SHIPPED | OUTPATIENT
Start: 2022-03-21 | End: 2022-03-25

## 2022-03-21 NOTE — PROGRESS NOTES
Assessment/Plan:    Problem List Items Addressed This Visit     None      Visit Diagnoses     Upper respiratory tract infection, unspecified type    -  Primary    Relevant Medications    azithromycin (ZITHROMAX) 250 mg tablet    benzonatate (TESSALON PERLES) 100 mg capsule           Diagnoses and all orders for this visit:    Upper respiratory tract infection, unspecified type  -     azithromycin (ZITHROMAX) 250 mg tablet; Take 2 tablets today then 1 tablet daily x 4 days  -     benzonatate (TESSALON PERLES) 100 mg capsule; Take 1 capsule (100 mg total) by mouth 3 (three) times a day as needed for cough        Script for zithromax and tessalon perles  Recommended that she continue symptomatic relief  Push fluids  She will notify us of any new or worsening symptoms  Subjective:      Patient ID: Candance Closs is a 33 y o  female  Ary Best is a pleasant 33year old female who is here today complaining of cold symptoms for a week  She had a sore throat, which mostly resolved  She also has congestion, runny nose, cough, and chest tightness  She has been using her inhaler more often and tried OTC Delsym  She denies any fevers, chills, body aches, nausea, vomiting, or diarrhea  Her , sister, and son all had similar symptoms  The following portions of the patient's history were reviewed and updated as appropriate:   She has a past medical history of Known health problems: none (2014)  ,  does not have any pertinent problems on file  ,   has a past surgical history that includes No past surgeries and  section  ,  family history includes Allergy (severe) in her mother  ,   reports that she has never smoked  She has never used smokeless tobacco  She reports previous alcohol use  She reports that she does not use drugs  ,  is 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, other, soybean-containing drug products - food allergy, apple - food allergy, shellfish allergy - food allergy, short ragweed pollen ext, tree extract, and uncaria tomentosa (cats claw)     Current Outpatient Medications   Medication Sig Dispense Refill    sertraline (ZOLOFT) 50 mg tablet Take 1 tablet (50 mg total) by mouth daily 90 tablet 1    azithromycin (ZITHROMAX) 250 mg tablet Take 2 tablets today then 1 tablet daily x 4 days 6 tablet 0    benzonatate (TESSALON PERLES) 100 mg capsule Take 1 capsule (100 mg total) by mouth 3 (three) times a day as needed for cough 20 capsule 0    diphenhydrAMINE (BENADRYL) 12 5 MG chewable tablet Chew and swallow 4 tablets immediately for symptoms of anaphylaxis (Patient not taking: Reported on 3/16/2022 ) 15 tablet 3    EPINEPHrine (EPIPEN) 0 3 mg/0 3 mL SOAJ Inject 0 3 mL (0 3 mg total) into a muscle once for 1 dose 0 6 mL 5    Prenatal Vit-Fe Fumarate-FA (PRENATAL 1 PLUS 1 PO) Take 1 tablet by mouth daily   (Patient not taking: Reported on 3/16/2022 )       No current facility-administered medications for this visit  Review of Systems   Constitutional: Negative for chills, diaphoresis, fatigue and fever  HENT: Positive for congestion, rhinorrhea and sore throat  Negative for ear pain, postnasal drip, sneezing and trouble swallowing  Eyes: Negative for pain and visual disturbance  Respiratory: Positive for cough  Negative for apnea, shortness of breath and wheezing  Cardiovascular: Negative for chest pain and palpitations  Gastrointestinal: Negative for abdominal pain, constipation, diarrhea, nausea and vomiting  Genitourinary: Negative for dysuria and hematuria  Musculoskeletal: Negative for arthralgias, gait problem and myalgias  Neurological: Negative for dizziness, syncope, weakness, light-headedness, numbness and headaches  Psychiatric/Behavioral: Negative for suicidal ideas  The patient is not nervous/anxious            Objective:  Vitals:    03/21/22 1231   BP: 124/70   Pulse: 87   Temp: 97 8 °F (36 6 °C)   SpO2: 97%   Weight: 106 kg (233 lb)   Height: 5' 5" (1 651 m) Body mass index is 38 77 kg/m²  Physical Exam  Vitals and nursing note reviewed  Constitutional:       Appearance: She is well-developed  HENT:      Head: Normocephalic and atraumatic  Right Ear: Ear canal and external ear normal  Tympanic membrane is bulging  Left Ear: Ear canal and external ear normal  Tympanic membrane is bulging  Nose: Mucosal edema, congestion and rhinorrhea present  Mouth/Throat:      Pharynx: Posterior oropharyngeal erythema present  No oropharyngeal exudate  Eyes:      Pupils: Pupils are equal, round, and reactive to light  Cardiovascular:      Rate and Rhythm: Normal rate and regular rhythm  Heart sounds: Normal heart sounds  No murmur heard  No friction rub  No gallop  Pulmonary:      Effort: Pulmonary effort is normal  No respiratory distress  Breath sounds: Normal breath sounds  No wheezing or rales  Musculoskeletal:         General: Normal range of motion  Cervical back: Normal range of motion and neck supple  Lymphadenopathy:      Cervical: No cervical adenopathy  Skin:     General: Skin is warm and dry  Neurological:      Mental Status: She is alert and oriented to person, place, and time  Psychiatric:         Behavior: Behavior normal          Thought Content:  Thought content normal          Judgment: Judgment normal

## 2022-09-12 ENCOUNTER — OFFICE VISIT (OUTPATIENT)
Dept: FAMILY MEDICINE CLINIC | Facility: CLINIC | Age: 27
End: 2022-09-12
Payer: COMMERCIAL

## 2022-09-12 VITALS
BODY MASS INDEX: 38.89 KG/M2 | DIASTOLIC BLOOD PRESSURE: 62 MMHG | OXYGEN SATURATION: 94 % | WEIGHT: 233.4 LBS | SYSTOLIC BLOOD PRESSURE: 124 MMHG | TEMPERATURE: 98.4 F | HEART RATE: 82 BPM | HEIGHT: 65 IN

## 2022-09-12 DIAGNOSIS — L52 ERYTHEMA NODOSUM: Primary | ICD-10-CM

## 2022-09-12 PROCEDURE — 99213 OFFICE O/P EST LOW 20 MIN: CPT | Performed by: PHYSICIAN ASSISTANT

## 2022-09-12 RX ORDER — CETIRIZINE HYDROCHLORIDE 10 MG/1
10 TABLET ORAL DAILY
COMMUNITY

## 2022-09-12 RX ORDER — FLUTICASONE PROPIONATE 50 MCG
1 SPRAY, SUSPENSION (ML) NASAL DAILY
COMMUNITY

## 2022-09-12 NOTE — PROGRESS NOTES
Assessment/Plan:    Problem List Items Addressed This Visit    None     Visit Diagnoses     Erythema nodosum    -  Primary           Diagnoses and all orders for this visit:    Erythema nodosum    Other orders  -     cetirizine (ZyrTEC) 10 mg tablet; Take 10 mg by mouth daily  -     fluticasone (FLONASE) 50 mcg/act nasal spray; 1 spray into each nostril daily  -     Discontinue: Prenatal Vit-Fe Fumarate-FA (PRENATAL 19 PO); Take by mouth (Patient not taking: Reported on 2022)      She will continue ice, tylenol, and motrin as needed  If no improvement, will refer to dermatology  Explained that it is self-limiting    Subjective:      Patient ID: Elvin Lassiter is a 33 y o  female  Kenisha Pink is a pleasant 33year old female who is here today complaining of painful nodules on her shins bilaterally that look like bruises at times  It started about 2 months ago  She denies any trauma, recent infections, or new medications  She has been using tylenol, ice, heat, and motrin, with little relief  It seems to be worse when she is on her feet working as a  for long periods of time  She has not worked the past few days, and it seems to have been improving  The following portions of the patient's history were reviewed and updated as appropriate:   She has a past medical history of Known health problems: none (2014)  ,  does not have any pertinent problems on file  ,   has a past surgical history that includes No past surgeries and  section  ,  family history includes Allergy (severe) in her mother  ,   reports that she has never smoked  She has never used smokeless tobacco  She reports previous alcohol use  She reports that she does not use drugs  ,  is 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, other, soybean-containing drug products - food allergy, apple - food allergy, shellfish allergy - food allergy, short ragweed pollen ext, tree extract, and uncaria tomentosa (cats claw)     Current Outpatient Medications   Medication Sig Dispense Refill    cetirizine (ZyrTEC) 10 mg tablet Take 10 mg by mouth daily      fluticasone (FLONASE) 50 mcg/act nasal spray 1 spray into each nostril daily      Prenatal Vit-Fe Fumarate-FA (PRENATAL 1 PLUS 1 PO) Take 1 tablet by mouth daily      EPINEPHrine (EPIPEN) 0 3 mg/0 3 mL SOAJ Inject 0 3 mL (0 3 mg total) into a muscle once for 1 dose 0 6 mL 5     No current facility-administered medications for this visit  Review of Systems   Constitutional: Negative for chills, diaphoresis, fatigue and fever  HENT: Negative for congestion, ear pain, postnasal drip, rhinorrhea, sneezing, sore throat and trouble swallowing  Eyes: Negative for pain and visual disturbance  Respiratory: Negative for apnea, cough, shortness of breath and wheezing  Cardiovascular: Negative for chest pain and palpitations  Gastrointestinal: Negative for abdominal pain, constipation, diarrhea, nausea and vomiting  Genitourinary: Negative for dysuria and hematuria  Musculoskeletal: Negative for arthralgias, gait problem and myalgias  Skin: Positive for color change  Painful nodules on shins bilaterally   Neurological: Negative for dizziness, syncope, weakness, light-headedness, numbness and headaches  Psychiatric/Behavioral: Negative for suicidal ideas  The patient is not nervous/anxious  Objective:  Vitals:    09/12/22 1619   BP: 124/62   Pulse: 82   Temp: 98 4 °F (36 9 °C)   SpO2: 94%   Weight: 106 kg (233 lb 6 4 oz)   Height: 5' 5" (1 651 m)     Body mass index is 38 84 kg/m²  Physical Exam  Vitals and nursing note reviewed  Constitutional:       Appearance: She is well-developed  HENT:      Head: Normocephalic and atraumatic  Right Ear: External ear normal       Left Ear: External ear normal       Nose: Nose normal    Eyes:      Extraocular Movements: Extraocular movements intact  Cardiovascular:      Rate and Rhythm: Normal rate and regular rhythm  Heart sounds: Normal heart sounds  No murmur heard  No friction rub  No gallop  Pulmonary:      Effort: Pulmonary effort is normal  No respiratory distress  Breath sounds: Normal breath sounds  No wheezing or rales  Musculoskeletal:         General: Normal range of motion  Cervical back: Normal range of motion and neck supple  Lymphadenopathy:      Cervical: No cervical adenopathy  Skin:     General: Skin is warm and dry  Comments: 2 nodules on right anterior shin  1 nodule on left anterior shin  Mild purple discoloration on right shin below one of the nodules  Nodules are mildly tender to palpation    No calf pain bilaterally or edema   Neurological:      Mental Status: She is alert and oriented to person, place, and time  Psychiatric:         Behavior: Behavior normal          Thought Content:  Thought content normal          Judgment: Judgment normal

## 2023-01-12 ENCOUNTER — OFFICE VISIT (OUTPATIENT)
Dept: FAMILY MEDICINE CLINIC | Facility: CLINIC | Age: 28
End: 2023-01-12

## 2023-01-12 ENCOUNTER — LAB (OUTPATIENT)
Dept: LAB | Facility: MEDICAL CENTER | Age: 28
End: 2023-01-12

## 2023-01-12 VITALS
SYSTOLIC BLOOD PRESSURE: 138 MMHG | DIASTOLIC BLOOD PRESSURE: 80 MMHG | HEART RATE: 78 BPM | OXYGEN SATURATION: 98 % | WEIGHT: 227.4 LBS | BODY MASS INDEX: 37.89 KG/M2 | HEIGHT: 65 IN

## 2023-01-12 DIAGNOSIS — R63.5 WEIGHT GAIN: ICD-10-CM

## 2023-01-12 DIAGNOSIS — R63.5 WEIGHT GAIN: Primary | ICD-10-CM

## 2023-01-12 LAB
ALBUMIN SERPL BCP-MCNC: 4.2 G/DL (ref 3.5–5)
ALP SERPL-CCNC: 63 U/L (ref 46–116)
ALT SERPL W P-5'-P-CCNC: 32 U/L (ref 12–78)
ANION GAP SERPL CALCULATED.3IONS-SCNC: 6 MMOL/L (ref 4–13)
AST SERPL W P-5'-P-CCNC: 14 U/L (ref 5–45)
BASOPHILS # BLD AUTO: 0.04 THOUSANDS/ÂΜL (ref 0–0.1)
BASOPHILS NFR BLD AUTO: 1 % (ref 0–1)
BILIRUB SERPL-MCNC: 0.39 MG/DL (ref 0.2–1)
BUN SERPL-MCNC: 9 MG/DL (ref 5–25)
CALCIUM SERPL-MCNC: 9 MG/DL (ref 8.3–10.1)
CHLORIDE SERPL-SCNC: 108 MMOL/L (ref 96–108)
CO2 SERPL-SCNC: 24 MMOL/L (ref 21–32)
CREAT SERPL-MCNC: 0.84 MG/DL (ref 0.6–1.3)
EOSINOPHIL # BLD AUTO: 0.17 THOUSAND/ÂΜL (ref 0–0.61)
EOSINOPHIL NFR BLD AUTO: 3 % (ref 0–6)
ERYTHROCYTE [DISTWIDTH] IN BLOOD BY AUTOMATED COUNT: 12.5 % (ref 11.6–15.1)
GFR SERPL CREATININE-BSD FRML MDRD: 96 ML/MIN/1.73SQ M
GLUCOSE P FAST SERPL-MCNC: 91 MG/DL (ref 65–99)
HCT VFR BLD AUTO: 39.2 % (ref 34.8–46.1)
HGB BLD-MCNC: 12.9 G/DL (ref 11.5–15.4)
IMM GRANULOCYTES # BLD AUTO: 0.01 THOUSAND/UL (ref 0–0.2)
IMM GRANULOCYTES NFR BLD AUTO: 0 % (ref 0–2)
LYMPHOCYTES # BLD AUTO: 1.48 THOUSANDS/ÂΜL (ref 0.6–4.47)
LYMPHOCYTES NFR BLD AUTO: 26 % (ref 14–44)
MCH RBC QN AUTO: 29.3 PG (ref 26.8–34.3)
MCHC RBC AUTO-ENTMCNC: 32.9 G/DL (ref 31.4–37.4)
MCV RBC AUTO: 89 FL (ref 82–98)
MONOCYTES # BLD AUTO: 0.29 THOUSAND/ÂΜL (ref 0.17–1.22)
MONOCYTES NFR BLD AUTO: 5 % (ref 4–12)
NEUTROPHILS # BLD AUTO: 3.82 THOUSANDS/ÂΜL (ref 1.85–7.62)
NEUTS SEG NFR BLD AUTO: 65 % (ref 43–75)
NRBC BLD AUTO-RTO: 0 /100 WBCS
PLATELET # BLD AUTO: 323 THOUSANDS/UL (ref 149–390)
PMV BLD AUTO: 9.7 FL (ref 8.9–12.7)
POTASSIUM SERPL-SCNC: 4 MMOL/L (ref 3.5–5.3)
PROT SERPL-MCNC: 7.8 G/DL (ref 6.4–8.4)
RBC # BLD AUTO: 4.4 MILLION/UL (ref 3.81–5.12)
SODIUM SERPL-SCNC: 138 MMOL/L (ref 135–147)
TSH SERPL DL<=0.05 MIU/L-ACNC: 2.11 UIU/ML (ref 0.45–4.5)
WBC # BLD AUTO: 5.81 THOUSAND/UL (ref 4.31–10.16)

## 2023-01-12 NOTE — PROGRESS NOTES
Assessment/Plan:    Problem List Items Addressed This Visit    None  Visit Diagnoses     Weight gain    -  Primary    Relevant Orders    Ambulatory Referral to Weight Management    BMI 37 0-37 9, adult        Relevant Orders    Ambulatory Referral to Weight Management           Diagnoses and all orders for this visit:    Weight gain  -     Ambulatory Referral to Weight Management; Future    BMI 37 0-37 9, adult  -     Ambulatory Referral to Weight Management; Future      Referral to weight management  Encouraged her to get labs drawn  Encouraged her to continue to eat healthy and exercise regularly    Subjective:      Patient ID: Stuart Avila is a 33 y o  female  Dora Patten is a pleasant 33year old female who is here today with concerns about her weight  She gained a lot of weight after having her son, and she has been struggling to lose it  She has been watching her diet and exercising regularly  She is interested in seeing weight management to discuss options  She does have labs in her chart that she has not completed  She is not on any new medications  The following portions of the patient's history were reviewed and updated as appropriate:   She has a past medical history of Known health problems: none (2014)  ,  does not have any pertinent problems on file  ,   has a past surgical history that includes No past surgeries and  section  ,  family history includes Allergy (severe) in her mother  ,   reports that she has never smoked  She has never used smokeless tobacco  She reports that she does not currently use alcohol  She reports that she does not use drugs  ,  is 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, other, soybean-containing drug products - food allergy, apple - food allergy, shellfish allergy - food allergy, short ragweed pollen ext, tree extract, and uncaria tomentosa (cats claw)     Current Outpatient Medications   Medication Sig Dispense Refill   • cetirizine (ZyrTEC) 10 mg tablet Take 10 mg by mouth daily     • fluticasone (FLONASE) 50 mcg/act nasal spray 1 spray into each nostril daily     • Prenatal Vit-Fe Fumarate-FA (PRENATAL 1 PLUS 1 PO) Take 1 tablet by mouth daily     • EPINEPHrine (EPIPEN) 0 3 mg/0 3 mL SOAJ Inject 0 3 mL (0 3 mg total) into a muscle once for 1 dose 0 6 mL 5     No current facility-administered medications for this visit  Review of Systems   Constitutional: Positive for unexpected weight change  Negative for chills, diaphoresis, fatigue and fever  HENT: Negative for congestion, ear pain, postnasal drip, rhinorrhea, sneezing, sore throat and trouble swallowing  Eyes: Negative for pain and visual disturbance  Respiratory: Negative for apnea, cough, shortness of breath and wheezing  Cardiovascular: Negative for chest pain and palpitations  Gastrointestinal: Negative for abdominal pain, constipation, diarrhea, nausea and vomiting  Genitourinary: Negative for dysuria and hematuria  Musculoskeletal: Negative for arthralgias, gait problem and myalgias  Neurological: Negative for dizziness, syncope, weakness, light-headedness, numbness and headaches  Psychiatric/Behavioral: Negative for suicidal ideas  The patient is not nervous/anxious  Objective:  Vitals:    01/12/23 1127   BP: 138/80   Pulse: 78   SpO2: 98%   Weight: 103 kg (227 lb 6 4 oz)   Height: 5' 5" (1 651 m)     Body mass index is 37 84 kg/m²  Physical Exam  Vitals and nursing note reviewed  Constitutional:       Appearance: She is well-developed  HENT:      Head: Normocephalic and atraumatic  Right Ear: External ear normal       Left Ear: External ear normal       Nose: Nose normal    Eyes:      Extraocular Movements: Extraocular movements intact  Cardiovascular:      Rate and Rhythm: Normal rate and regular rhythm  Heart sounds: Normal heart sounds  No murmur heard  No friction rub  No gallop     Pulmonary:      Effort: Pulmonary effort is normal  No respiratory distress  Breath sounds: Normal breath sounds  No wheezing or rales  Musculoskeletal:         General: Normal range of motion  Cervical back: Normal range of motion and neck supple  Skin:     General: Skin is warm and dry  Neurological:      Mental Status: She is alert and oriented to person, place, and time  Psychiatric:         Behavior: Behavior normal          Thought Content:  Thought content normal          Judgment: Judgment normal

## 2023-03-29 ENCOUNTER — OFFICE VISIT (OUTPATIENT)
Dept: FAMILY MEDICINE CLINIC | Facility: CLINIC | Age: 28
End: 2023-03-29

## 2023-03-29 VITALS
HEIGHT: 65 IN | HEART RATE: 128 BPM | SYSTOLIC BLOOD PRESSURE: 118 MMHG | BODY MASS INDEX: 32.72 KG/M2 | WEIGHT: 196.4 LBS | DIASTOLIC BLOOD PRESSURE: 66 MMHG | TEMPERATURE: 98.3 F | OXYGEN SATURATION: 98 %

## 2023-03-29 DIAGNOSIS — J40 BRONCHITIS: Primary | ICD-10-CM

## 2023-03-29 RX ORDER — ALBUTEROL SULFATE 90 UG/1
2 AEROSOL, METERED RESPIRATORY (INHALATION) EVERY 6 HOURS PRN
COMMUNITY

## 2023-03-29 RX ORDER — SEMAGLUTIDE 1 MG/.5ML
INJECTION, SOLUTION SUBCUTANEOUS
COMMUNITY
Start: 2023-03-07

## 2023-03-29 RX ORDER — AZITHROMYCIN 250 MG/1
TABLET, FILM COATED ORAL
Qty: 6 TABLET | Refills: 0 | Status: SHIPPED | OUTPATIENT
Start: 2023-03-29 | End: 2023-04-02

## 2023-03-29 NOTE — PROGRESS NOTES
Assessment/Plan:    Problem List Items Addressed This Visit    None  Visit Diagnoses     Bronchitis    -  Primary    Relevant Medications    albuterol (PROVENTIL HFA,VENTOLIN HFA) 90 mcg/act inhaler    azithromycin (ZITHROMAX) 250 mg tablet           Diagnoses and all orders for this visit:    Bronchitis  -     azithromycin (ZITHROMAX) 250 mg tablet; Take 2 tablets today then 1 tablet daily x 4 days    Other orders  -     Wegovy 1 MG/0 5ML; INJECT 1 MG UNDER THE SKIN ONCE A WEEK FOR 4 WEEKS  -     albuterol (PROVENTIL HFA,VENTOLIN HFA) 90 mcg/act inhaler; Inhale 2 puffs every 6 (six) hours as needed      Script for zithromax  Recommended symptomatic relief  She will notify us if symptoms do not improve or worsen    Subjective:      Patient ID: Porsha Seymour is a 33 y o  female  Terry Valdivia is a pleasant 33year old female who is here today complaining of cough, congestion, runny nose, fevers, and nausea for the past 3 days  She admits that her fever broke  She took 3 COVID tests over the past 2 days that were all negative  She denies any sick contacts  She usually gets sick with the change of the seasons  She did need her inhaler last night, which did help  She has been taking Tylenol cold and sinus, which is helping  She has been able to tolerate liquids  She believes she is nauseous because she is taking Wygovy and has not had much of an appetite since being sick  The following portions of the patient's history were reviewed and updated as appropriate:   She has a past medical history of Known health problems: none (2014)  ,  does not have any pertinent problems on file  ,   has a past surgical history that includes No past surgeries and  section  ,  family history includes Allergy (severe) in her mother  ,   reports that she has never smoked  She has never used smokeless tobacco  She reports that she does not currently use alcohol  She reports that she does not use drugs  ,  is 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, other, soybean-containing drug products - food allergy, apple - food allergy, shellfish allergy - food allergy, short ragweed pollen ext, tree extract, and uncaria tomentosa (cats claw)     Current Outpatient Medications   Medication Sig Dispense Refill   • albuterol (PROVENTIL HFA,VENTOLIN HFA) 90 mcg/act inhaler Inhale 2 puffs every 6 (six) hours as needed     • azithromycin (ZITHROMAX) 250 mg tablet Take 2 tablets today then 1 tablet daily x 4 days 6 tablet 0   • cetirizine (ZyrTEC) 10 mg tablet Take 10 mg by mouth daily     • fluticasone (FLONASE) 50 mcg/act nasal spray 1 spray into each nostril daily     • Prenatal Vit-Fe Fumarate-FA (PRENATAL 1 PLUS 1 PO) Take 1 tablet by mouth daily     • Wegovy 1 MG/0 5ML INJECT 1 MG UNDER THE SKIN ONCE A WEEK FOR 4 WEEKS     • EPINEPHrine (EPIPEN) 0 3 mg/0 3 mL SOAJ Inject 0 3 mL (0 3 mg total) into a muscle once for 1 dose 0 6 mL 5     No current facility-administered medications for this visit  Review of Systems   Constitutional: Positive for fatigue and fever  Negative for chills and diaphoresis  HENT: Positive for congestion, rhinorrhea and sinus pressure  Negative for ear pain, postnasal drip, sneezing, sore throat and trouble swallowing  Eyes: Negative for pain and visual disturbance  Respiratory: Positive for cough  Negative for apnea, shortness of breath and wheezing  Cardiovascular: Negative for chest pain and palpitations  Gastrointestinal: Positive for nausea  Negative for abdominal pain, constipation, diarrhea and vomiting  Genitourinary: Negative for dysuria and hematuria  Musculoskeletal: Negative for arthralgias, gait problem and myalgias  Neurological: Negative for dizziness, syncope, weakness, light-headedness, numbness and headaches  Psychiatric/Behavioral: Negative for suicidal ideas  The patient is not nervous/anxious            Objective:  Vitals:    03/29/23 0846   BP: 118/66   Pulse: (!) 128   Temp: 98 3 °F (36 8 "°C)   SpO2: 98%   Weight: 89 1 kg (196 lb 6 4 oz)   Height: 5' 5\" (1 651 m)     Body mass index is 32 68 kg/m²  Physical Exam  Vitals and nursing note reviewed  Constitutional:       Appearance: She is well-developed  HENT:      Head: Normocephalic and atraumatic  Right Ear: Tympanic membrane, ear canal and external ear normal       Left Ear: Tympanic membrane, ear canal and external ear normal       Nose: Congestion and rhinorrhea present  Mouth/Throat:      Pharynx: No oropharyngeal exudate or posterior oropharyngeal erythema  Eyes:      Extraocular Movements: Extraocular movements intact  Cardiovascular:      Rate and Rhythm: Normal rate and regular rhythm  Heart sounds: Normal heart sounds  No murmur heard  No friction rub  No gallop  Pulmonary:      Effort: Pulmonary effort is normal  No respiratory distress  Breath sounds: Normal breath sounds  No wheezing or rales  Comments: Coarse cough with deep inspiration  Musculoskeletal:         General: Normal range of motion  Cervical back: Normal range of motion and neck supple  Lymphadenopathy:      Cervical: No cervical adenopathy  Skin:     General: Skin is warm and dry  Findings: No rash  Neurological:      Mental Status: She is alert and oriented to person, place, and time  Psychiatric:         Behavior: Behavior normal          Thought Content:  Thought content normal          Judgment: Judgment normal          "

## 2023-04-03 ENCOUNTER — TELEPHONE (OUTPATIENT)
Dept: FAMILY MEDICINE CLINIC | Facility: CLINIC | Age: 28
End: 2023-04-03

## 2023-04-03 DIAGNOSIS — J40 BRONCHITIS: Primary | ICD-10-CM

## 2023-04-03 RX ORDER — METHYLPREDNISOLONE 4 MG/1
TABLET ORAL
Qty: 21 EACH | Refills: 0 | Status: SHIPPED | OUTPATIENT
Start: 2023-04-03

## 2023-04-03 NOTE — TELEPHONE ENCOUNTER
She saw you last week for bronchitis, and is still not feeling better    She has a cough and fatigue, and is finished all the medication

## 2023-04-03 NOTE — TELEPHONE ENCOUNTER
I sent a steroid pack into the pharmacy for her  The antibiotic will last for a full 10 days, even though you only take it for 5  Continue symptomatic relief

## 2023-05-19 ENCOUNTER — OFFICE VISIT (OUTPATIENT)
Dept: FAMILY MEDICINE CLINIC | Facility: CLINIC | Age: 28
End: 2023-05-19

## 2023-05-19 VITALS
SYSTOLIC BLOOD PRESSURE: 124 MMHG | HEART RATE: 98 BPM | OXYGEN SATURATION: 99 % | RESPIRATION RATE: 18 BRPM | DIASTOLIC BLOOD PRESSURE: 74 MMHG | WEIGHT: 183 LBS | TEMPERATURE: 98.9 F | HEIGHT: 65 IN | BODY MASS INDEX: 30.49 KG/M2

## 2023-05-19 DIAGNOSIS — J30.2 SEASONAL ALLERGIES: ICD-10-CM

## 2023-05-19 DIAGNOSIS — Z00.00 ANNUAL PHYSICAL EXAM: Primary | ICD-10-CM

## 2023-05-19 DIAGNOSIS — Z71.3 WEIGHT LOSS COUNSELING, ENCOUNTER FOR: ICD-10-CM

## 2023-05-19 DIAGNOSIS — H10.13 ALLERGIC CONJUNCTIVITIS OF BOTH EYES: ICD-10-CM

## 2023-05-19 RX ORDER — MONTELUKAST SODIUM 10 MG/1
10 TABLET ORAL
Qty: 30 TABLET | Refills: 2 | Status: SHIPPED | OUTPATIENT
Start: 2023-05-19

## 2023-05-19 RX ORDER — BEPOTASTINE BESILATE 15 MG/ML
1 SOLUTION/ DROPS OPHTHALMIC 2 TIMES DAILY
Qty: 10 ML | Refills: 2 | Status: SHIPPED | OUTPATIENT
Start: 2023-05-19

## 2023-05-19 NOTE — PROGRESS NOTES
ADULT ANNUAL PHYSICAL  [facility]    NAME: Celi Carney  AGE: 33 y o  SEX: female  : 1995     DATE: 2023     Assessment and Plan:     Problem List Items Addressed This Visit    None  Visit Diagnoses     Annual physical exam    -  Primary    BMI 30 0-30 9,adult        Weight loss counseling, encounter for        Seasonal allergies        Relevant Medications    montelukast (SINGULAIR) 10 mg tablet    Allergic conjunctivitis of both eyes        Relevant Medications    bepotastine besilate (BEPREVE) 1 5 % op soln          Immunizations and preventive care screenings were discussed with patient today  Appropriate education was printed on patient's after visit summary  Counseling:  Alcohol/drug use: discussed moderation in alcohol intake, the recommendations for healthy alcohol use, and avoidance of illicit drug use  Dental Health: discussed importance of regular tooth brushing, flossing, and dental visits  Injury prevention: discussed safety/seat belts, safety helmets, smoke detectors, carbon dioxide detectors, and smoking near bedding or upholstery  Sexual health: discussed sexually transmitted diseases, partner selection, use of condoms, avoidance of unintended pregnancy, and contraceptive alternatives  · Exercise: the importance of regular exercise/physical activity was discussed  Recommend exercise 3-5 times per week for at least 30 minutes  BMI Counseling: Body mass index is 30 45 kg/m²  The BMI is above normal  Nutrition recommendations include reducing portion sizes, decreasing overall calorie intake, 3-5 servings of fruits/vegetables daily, reducing fast food intake, consuming healthier snacks, decreasing soda and/or juice intake, moderation in carbohydrate intake, increasing intake of lean protein, reducing intake of saturated fat and trans fat and reducing intake of cholesterol   Exercise recommendations include moderate aerobic physical activity for 150 minutes/week, exercising 3-5 times per week and strength training exercises  Pharmacotherapy was ordered for patient to aid in weight loss  Return in 1 year (on 5/19/2024)  Chief Complaint:     Chief Complaint   Patient presents with   • Annual Exam      History of Present Illness:     Adult Annual Physical   Patient here for a comprehensive physical exam  The patient reports no problems  Currently on Wegovy for weight loss and doing well  Is now ready to start the 2 4mg weekly dose  Diet and Physical Activity  · Diet/Nutrition: well balanced diet, limited junk food, consuming 3-5 servings of fruits/vegetables daily and adequate fiber intake  · Exercise: walking and 30-60 minutes on average  Depression Screening  PHQ-2/9 Depression Screening         General Health  · Sleep: sleeps well and gets 7-8 hours of sleep on average  · Hearing: normal - bilateral   · Vision: no vision problems, goes for regular eye exams and wears glasses  · Dental: regular dental visits, brushes teeth twice daily and flosses teeth occasionally  /GYN Health  · Last menstrual period: 5/18/23  · Contraceptive method: none currently, planning to have IUD inserted at next GYN appointment  · History of STDs?: no      Review of Systems:     Review of Systems   Constitutional: Negative for chills and fever  Respiratory: Negative for shortness of breath  Cardiovascular: Negative for chest pain  Gastrointestinal: Negative for abdominal pain, constipation, diarrhea, nausea and vomiting  Genitourinary: Negative for dysuria  Musculoskeletal: Negative for arthralgias and myalgias  Skin: Negative for rash  Neurological: Negative for headaches  Psychiatric/Behavioral: Negative for dysphoric mood  All other systems reviewed and are negative       Past Medical History:     Past Medical History:   Diagnosis Date   • Known health problems: none 05/12/2014    Last assessed      Past Surgical History:     Past Surgical History:   Procedure Laterality Date   •  SECTION     • NO PAST SURGERIES        Social History:     Social History     Socioeconomic History   • Marital status: /Civil Union     Spouse name: None   • Number of children: None   • Years of education: None   • Highest education level: None   Occupational History   • None   Tobacco Use   • Smoking status: Never   • Smokeless tobacco: Never   Vaping Use   • Vaping Use: Never used   Substance and Sexual Activity   • Alcohol use: Not Currently   • Drug use: Never   • Sexual activity: Yes     Partners: Male   Other Topics Concern   • None   Social History Narrative    Uses seat belt      Social Determinants of Health     Financial Resource Strain: Not on file   Food Insecurity: Not on file   Transportation Needs: Not on file   Physical Activity: Not on file   Stress: Not on file   Social Connections: Not on file   Intimate Partner Violence: Not on file   Housing Stability: Not on file      Family History:     Family History   Problem Relation Age of Onset   • Allergy (severe) Mother         Mold      Current Medications:     Current Outpatient Medications   Medication Sig Dispense Refill   • albuterol (PROVENTIL HFA,VENTOLIN HFA) 90 mcg/act inhaler Inhale 2 puffs every 6 (six) hours as needed     • bepotastine besilate (BEPREVE) 1 5 % op soln Administer 1 drop to both eyes 2 (two) times a day 10 mL 2   • cetirizine (ZyrTEC) 10 mg tablet Take 10 mg by mouth daily     • fluticasone (FLONASE) 50 mcg/act nasal spray 1 spray into each nostril daily     • montelukast (SINGULAIR) 10 mg tablet Take 1 tablet (10 mg total) by mouth daily at bedtime 30 tablet 2   • Probiotic Product (Probiotic Daily) CAPS Take 1 capsule by mouth 3 (three) times a day     • Semaglutide-Weight Management (WEGOVY) 2 4 MG/0 75ML Inject 0 75 mL (2 4 mg total) under the skin once a week 3 mL 1   • EPINEPHrine (EPIPEN) 0 3 mg/0 3 mL SOAJ "Inject 0 3 mL (0 3 mg total) into a muscle once for 1 dose 0 6 mL 5   • Prenatal Vit-Fe Fumarate-FA (PRENATAL 1 PLUS 1 PO) Take 1 tablet by mouth daily (Patient not taking: Reported on 4/14/2023)       No current facility-administered medications for this visit  Allergies: Allergies   Allergen Reactions   • Nuts - Food Allergy Throat Swelling     Almonds, hazelnuts and peanuts   • Other Itching     Seasonal pollin allergies   • Soybean-Containing Drug Products - Food Allergy GI Intolerance   • Apple - Food Allergy    • Shellfish Allergy - Food Allergy    • Short Ragweed Pollen Ext    • Tree Extract    • Uncaria Tomentosa (Cats Claw) Allergic Rhinitis      Physical Exam:     /74   Pulse 98   Temp 98 9 °F (37 2 °C)   Resp 18   Ht 5' 5\" (1 651 m)   Wt 83 kg (183 lb)   SpO2 99%   BMI 30 45 kg/m²     Physical Exam  Vitals and nursing note reviewed  Constitutional:       General: She is not in acute distress  Appearance: She is well-developed  HENT:      Head: Normocephalic and atraumatic  Right Ear: Tympanic membrane, ear canal and external ear normal       Left Ear: Tympanic membrane, ear canal and external ear normal       Nose: Nose normal       Mouth/Throat:      Mouth: Mucous membranes are moist       Pharynx: Oropharynx is clear  Eyes:      Extraocular Movements: Extraocular movements intact  Conjunctiva/sclera: Conjunctivae normal       Pupils: Pupils are equal, round, and reactive to light  Cardiovascular:      Rate and Rhythm: Normal rate and regular rhythm  Heart sounds: No murmur heard  Pulmonary:      Effort: Pulmonary effort is normal  No respiratory distress  Breath sounds: Normal breath sounds  Abdominal:      General: Bowel sounds are normal  There is no distension  Palpations: Abdomen is soft  There is no mass  Tenderness: There is no abdominal tenderness  There is no guarding or rebound  Hernia: No hernia is present   " Musculoskeletal:         General: No swelling  Cervical back: Neck supple  Right lower leg: No edema  Left lower leg: No edema  Lymphadenopathy:      Cervical: No cervical adenopathy  Skin:     General: Skin is warm and dry  Capillary Refill: Capillary refill takes less than 2 seconds  Neurological:      General: No focal deficit present  Mental Status: She is alert and oriented to person, place, and time  Cranial Nerves: No cranial nerve deficit  Psychiatric:         Mood and Affect: Mood normal          Behavior: Behavior normal          Thought Content:  Thought content normal          Judgment: Judgment normal           Rene Jeffery DO   [address]

## 2023-05-23 ENCOUNTER — TELEPHONE (OUTPATIENT)
Dept: FAMILY MEDICINE CLINIC | Facility: CLINIC | Age: 28
End: 2023-05-23

## 2023-05-23 NOTE — TELEPHONE ENCOUNTER
Patient called asking if the physical she had done with you on 5/19/23 would be valid for a physical form she needs filled out to work at the [employer]?  She will need a TB test as well but will schedule that once she has the job,

## 2023-06-13 ENCOUNTER — OFFICE VISIT (OUTPATIENT)
Dept: OBGYN CLINIC | Facility: MEDICAL CENTER | Age: 28
End: 2023-06-13
Payer: COMMERCIAL

## 2023-06-13 VITALS
DIASTOLIC BLOOD PRESSURE: 70 MMHG | HEIGHT: 65 IN | BODY MASS INDEX: 29.66 KG/M2 | SYSTOLIC BLOOD PRESSURE: 104 MMHG | WEIGHT: 178 LBS

## 2023-06-13 DIAGNOSIS — Z01.419 ENCOUNTER FOR WELL WOMAN EXAM WITH ROUTINE GYNECOLOGICAL EXAM: Primary | ICD-10-CM

## 2023-06-13 DIAGNOSIS — Z30.430 ENCOUNTER FOR IUD INSERTION: ICD-10-CM

## 2023-06-13 DIAGNOSIS — Z01.419 ENCOUNTER FOR GYNECOLOGICAL EXAMINATION: ICD-10-CM

## 2023-06-13 PROCEDURE — S0610 ANNUAL GYNECOLOGICAL EXAMINA: HCPCS | Performed by: OBSTETRICS & GYNECOLOGY

## 2023-06-13 RX ORDER — OXYCODONE HYDROCHLORIDE AND ACETAMINOPHEN 5; 325 MG/1; MG/1
TABLET ORAL
Qty: 2 TABLET | Refills: 0 | Status: SHIPPED | OUTPATIENT
Start: 2023-06-13

## 2023-06-13 RX ORDER — ALPRAZOLAM 0.5 MG/1
TABLET ORAL
Qty: 2 TABLET | Refills: 0 | Status: SHIPPED | OUTPATIENT
Start: 2023-06-13

## 2023-06-13 RX ORDER — NALOXONE HYDROCHLORIDE 4 MG/.1ML
SPRAY NASAL
Qty: 1 EACH | Refills: 1 | Status: SHIPPED | OUTPATIENT
Start: 2023-06-13 | End: 2024-06-12

## 2023-06-13 NOTE — PROGRESS NOTES
"[facility] [address] [place] [address]    ASSESSMENT/PLAN: Yamil Weiner is a 33 y o  Scherrie Cooler who presents for annual gynecologic exam     Encounter for routine gynecologic examination  - Routine well woman exam completed today  - Cervical Cancer Screening: Current ASCCP Guidelines reviewed  Last Pap: 08/15/2017  Next Pap Due: today, but cycle started today and is heavy  Will perform at next visit with IUD insertion    - Contraceptive counseling discussed  Current contraception: none, would like IUD     Additional problems addressed during this visit:  1  Encounter for well woman exam with routine gynecological exam    2  Encounter for gynecological examination  -     Ambulatory Referral to Gynecology        CC:  Annual Gynecologic Examination    HPI: Yamil Weiner is a 33 y o  Scherrie Cooler who presents for annual gynecologic examination  HPI  For routine annual exam, complains of heavy menstrual bleeding with her cycles  She often needs to change her ultra tampons hourly  Interested in IUD  Agrees to have Mirena placed  The following portions of the patient's history were reviewed and updated as appropriate: allergies, current medications, past family history, past medical history, obstetric history, gynecologic history, past social history, past surgical history and problem list     Review of Systems   Constitutional: Negative  HENT: Negative  Eyes: Negative  Respiratory: Negative  Cardiovascular: Negative  Gastrointestinal: Negative  Genitourinary: Positive for menstrual problem  Musculoskeletal: Negative  All other systems reviewed and are negative  Objective:  /70 (BP Location: Left arm)   Ht 5' 5\" (1 651 m)   Wt 80 7 kg (178 lb)   LMP 06/13/2023   BMI 29 62 kg/m²    Physical Exam  Vitals reviewed  Constitutional:       Appearance: Normal appearance  Cardiovascular:      Rate and Rhythm: Normal rate     Pulmonary:      Effort: " Pulmonary effort is normal  No respiratory distress  Neurological:      Mental Status: She is alert     Psychiatric:         Mood and Affect: Mood normal          Behavior: Behavior normal

## 2023-06-28 ENCOUNTER — PROCEDURE VISIT (OUTPATIENT)
Dept: OBGYN CLINIC | Facility: CLINIC | Age: 28
End: 2023-06-28
Payer: COMMERCIAL

## 2023-06-28 VITALS
WEIGHT: 177 LBS | HEIGHT: 65 IN | SYSTOLIC BLOOD PRESSURE: 100 MMHG | DIASTOLIC BLOOD PRESSURE: 70 MMHG | BODY MASS INDEX: 29.49 KG/M2

## 2023-06-28 DIAGNOSIS — Z30.430 ENCOUNTER FOR IUD INSERTION: Primary | ICD-10-CM

## 2023-06-28 DIAGNOSIS — Z01.419 ENCOUNTER FOR GYNECOLOGICAL EXAMINATION: ICD-10-CM

## 2023-06-28 LAB — SL AMB POCT URINE HCG: NEGATIVE

## 2023-06-28 PROCEDURE — G0145 SCR C/V CYTO,THINLAYER,RESCR: HCPCS | Performed by: OBSTETRICS & GYNECOLOGY

## 2023-06-28 PROCEDURE — 58300 INSERT INTRAUTERINE DEVICE: CPT | Performed by: OBSTETRICS & GYNECOLOGY

## 2023-06-28 PROCEDURE — 81025 URINE PREGNANCY TEST: CPT | Performed by: OBSTETRICS & GYNECOLOGY

## 2023-06-28 NOTE — PROGRESS NOTES
Iud insertions    Date/Time: 6/28/2023 10:45 AM    Performed by: Dann Lopez MD  Authorized by: Dann Lopez MD    Verbal consent obtained?: Yes    Written consent obtained?: Yes    Risks and benefits: Risks, benefits and alternatives were discussed    Consent given by:  Patient  Patient states understanding of procedure being performed: Yes    Patient's understanding of procedure matches consent: Yes    Procedure consent matches procedure scheduled: Yes    Required items: Required blood products, implants, devices and special equipment available    Patient identity confirmed:  Verbally with patient  Procedure:     Negative urine pregnancy test: yes      Cervix cleaned and prepped: yes      Speculum placed in vagina: yes      Tenaculum applied to cervix: yes      Uterus sounded: yes      Uterus sound depth (cm):  8 5    IUD inserted with no complications: yes      IUD type:  Mirena    Strings trimmed: yes    Post-procedure:     Patient will follow up after next period: 1 month        pap done as well

## 2023-07-03 LAB
LAB AP GYN PRIMARY INTERPRETATION: NORMAL
Lab: NORMAL

## 2023-07-09 RX ORDER — SEMAGLUTIDE 2.4 MG/.75ML
INJECTION, SOLUTION SUBCUTANEOUS
Qty: 4 ML | Refills: 0 | Status: SHIPPED | OUTPATIENT
Start: 2023-07-09

## 2023-08-03 ENCOUNTER — TELEPHONE (OUTPATIENT)
Dept: OBGYN CLINIC | Facility: MEDICAL CENTER | Age: 28
End: 2023-08-03

## 2023-08-03 ENCOUNTER — HOSPITAL ENCOUNTER (OUTPATIENT)
Dept: ULTRASOUND IMAGING | Facility: HOSPITAL | Age: 28
Discharge: HOME | End: 2023-08-03
Attending: OBSTETRICS & GYNECOLOGY
Payer: COMMERCIAL

## 2023-08-03 ENCOUNTER — TELEPHONE (OUTPATIENT)
Dept: OBGYN CLINIC | Facility: CLINIC | Age: 28
End: 2023-08-03

## 2023-08-03 ENCOUNTER — PATIENT MESSAGE (OUTPATIENT)
Dept: OBGYN CLINIC | Facility: CLINIC | Age: 28
End: 2023-08-03

## 2023-08-03 DIAGNOSIS — N93.9 ABNORMAL UTERINE BLEEDING (AUB): ICD-10-CM

## 2023-08-03 DIAGNOSIS — N93.9 ABNORMAL UTERINE BLEEDING (AUB): Primary | ICD-10-CM

## 2023-08-03 PROCEDURE — 76856 US EXAM PELVIC COMPLETE: CPT

## 2023-08-03 PROCEDURE — 76830 TRANSVAGINAL US NON-OB: CPT

## 2023-08-03 NOTE — TELEPHONE ENCOUNTER
Yes please. IUD just recently inserted, so may be transitional.     If can generally use NSAIDs for pain without issue (no obvious contraindication on chart review, but typically sees Willian Lei), advise to also use ibuprofen 600mg q6hr or naproxen 500mg q12hr for 48hr; NSAIDs can sometimes help to reduce flow related to inflammation.

## 2023-08-03 NOTE — PATIENT COMMUNICATION
Contacted patient. Advised order was placed for ultrasound. Provided central scheduling number. Patient stated she has not been bleeding through a pad in an hour but it is close to 2 hours. Reviewed bleeding precautions with patient and advised to seek evaluation in ED if she begins to fill a pad an hour on a consistent basis.       Patient will call with any further questions or concerns

## 2023-08-03 NOTE — PATIENT COMMUNICATION
Pt called office state she da a IUD put in told her doctor Donnice Schilder recommendations pt state has been bleeding for past 3 days & today I am bleeding through pads & passing clots.

## 2023-08-03 NOTE — TELEPHONE ENCOUNTER
No indication on either your or pt report as to how quickly this is occurring. How quickly is she bleeding through pads? If filling a pad in under an hour consistently, needs assessment in ER.

## 2023-08-05 RX ORDER — SEMAGLUTIDE 2.4 MG/.75ML
INJECTION, SOLUTION SUBCUTANEOUS
Qty: 4 ML | Refills: 0 | Status: CANCELLED | OUTPATIENT
Start: 2023-08-05

## 2023-08-08 ENCOUNTER — TELEPHONE (OUTPATIENT)
Dept: OBGYN CLINIC | Facility: MEDICAL CENTER | Age: 28
End: 2023-08-08

## 2023-08-08 ENCOUNTER — TELEPHONE (OUTPATIENT)
Dept: OBGYN CLINIC | Facility: CLINIC | Age: 28
End: 2023-08-08

## 2023-08-08 NOTE — TELEPHONE ENCOUNTER
Patient returned phone call - just wanted further explanation of the findings on the ultrasound that was completed. discussed with patient what a fibroid is and what it could cause.   Patient will discuss further at her next visit

## 2023-08-08 NOTE — TELEPHONE ENCOUNTER
Pt called. Needs a call back to explain her better the results of the [address] on 08/03, she got the results trough a message but doesn't know what is a fibroid and furthermore.  Also wants to do the removal of the IUD (even knowing that the IUD can help as a treatment), let her know that she can discuss that in her next appt with Nik on 08/16

## 2023-08-13 RX ORDER — SEMAGLUTIDE 2.4 MG/.75ML
INJECTION, SOLUTION SUBCUTANEOUS WEEKLY
Qty: 4 ML | Refills: 0 | OUTPATIENT
Start: 2023-08-13

## 2023-08-14 ENCOUNTER — OFFICE VISIT (OUTPATIENT)
Dept: FAMILY MEDICINE CLINIC | Facility: CLINIC | Age: 28
End: 2023-08-14
Payer: COMMERCIAL

## 2023-08-14 VITALS
WEIGHT: 167 LBS | TEMPERATURE: 98.7 F | OXYGEN SATURATION: 99 % | BODY MASS INDEX: 27.82 KG/M2 | DIASTOLIC BLOOD PRESSURE: 64 MMHG | SYSTOLIC BLOOD PRESSURE: 100 MMHG | RESPIRATION RATE: 18 BRPM | HEIGHT: 65 IN | HEART RATE: 93 BPM

## 2023-08-14 DIAGNOSIS — Z76.89 ENCOUNTER FOR WEIGHT MANAGEMENT: Primary | ICD-10-CM

## 2023-08-14 PROCEDURE — 99213 OFFICE O/P EST LOW 20 MIN: CPT | Performed by: STUDENT IN AN ORGANIZED HEALTH CARE EDUCATION/TRAINING PROGRAM

## 2023-08-14 RX ORDER — SEMAGLUTIDE 2.4 MG/.75ML
INJECTION, SOLUTION SUBCUTANEOUS
Qty: 4 ML | Refills: 0 | Status: CANCELLED | OUTPATIENT
Start: 2023-08-14

## 2023-08-14 NOTE — ASSESSMENT & PLAN NOTE
-Patient doing well on 2.4 mg wegovy with no complaints.  Physical exam wnl.  -Reordered wegovy 2.4 mg   -Counseled patient on continuing exercise and healthy diet  -Follow up in 6-8 weeks or as needed

## 2023-08-14 NOTE — PROGRESS NOTES
Name: Mora Guerrero      : 1995      MRN: [id number]  Encounter Provider: Carlos Vaughan DO  Encounter Date: 2023   Encounter department: [address] [place] [address]    Assessment & Plan     1. Encounter for weight management  Assessment & Plan:  -Patient doing well on 2.4 mg wegovy with no complaints. Physical exam wnl.  -Reordered wegovy 2.4 mg   -Counseled patient on continuing exercise and healthy diet  -Follow up in 6-8 weeks or as needed     Orders:  -     Semaglutide-Weight Management (WEGOVY) 2.4 MG/0.75ML; Inject 0.75 mL (2.4 mg total) under the skin once a week         Subjective      Mora Guerrero is a 33 y.o. female who presents for follow up for weight management. Has been taking wegovy to assist with weight loss. Patient is 10 lbs down since last visit on 23. She has no complaints and is doing will on the medication. Reports an increase in energy since losing weight. She is attempting to eat healthier by avoiding fast food. Reports some exercise with her  and desires to get into weight lifting once her schedule becomes more predictable. Patient has set a goal of getting into a healthy BMI range and then she is agreeable to weaning off the wegovy once she reaches that goal. She has no other questions or concerns at this time. Review of Systems   Constitutional: Negative for chills and fever. Respiratory: Negative for cough and shortness of breath. Cardiovascular: Negative for chest pain and palpitations. Gastrointestinal: Negative for abdominal pain, diarrhea, nausea and vomiting. Neurological: Negative for syncope. All other systems reviewed and are negative.       Current Outpatient Medications on File Prior to Visit   Medication Sig   • albuterol (PROVENTIL HFA,VENTOLIN HFA) 90 mcg/act inhaler Inhale 2 puffs every 6 (six) hours as needed   • ALPRAZolam (XANAX) 0.5 mg tablet Take 1 tab 30-60 minutes prior to procedure, repeat if necessary   • cetirizine (ZyrTEC) 10 mg tablet Take 10 mg by mouth daily   • fluticasone (FLONASE) 50 mcg/act nasal spray 1 spray into each nostril daily   • montelukast (SINGULAIR) 10 mg tablet Take 1 tablet (10 mg total) by mouth daily at bedtime   • naloxone (NARCAN) 4 mg/0.1 mL nasal spray Administer 1 spray into a nostril. If no response after 2-3 minutes, give another dose in the other nostril using a new spray. • oxyCODONE-acetaminophen (Percocet) 5-325 mg per tablet Take 1 pill prior to procedure, repeat if necessary   • Prenatal Vit-Fe Fumarate-FA (PRENATAL 1 PLUS 1 PO) Take 1 tablet by mouth daily   • Probiotic Product (Probiotic Daily) CAPS Take 1 capsule by mouth 3 (three) times a day   • Wegovy 2.4 MG/0.75ML INJECT 0.75ML (2.4MG TOTAL) UNDER THE SKIN ONCE A WEEK   • EPINEPHrine (EPIPEN) 0.3 mg/0.3 mL SOAJ Inject 0.3 mL (0.3 mg total) into a muscle once for 1 dose       Objective     /64   Pulse 93   Temp 98.7 °F (37.1 °C)   Resp 18   Ht 5' 5" (1.651 m)   Wt 75.8 kg (167 lb)   SpO2 99%   BMI 27.79 kg/m²     Physical Exam  Vitals reviewed. Constitutional:       Appearance: Normal appearance. HENT:      Head: Normocephalic and atraumatic. Cardiovascular:      Rate and Rhythm: Normal rate and regular rhythm. Heart sounds: Normal heart sounds. No murmur heard. No friction rub. No gallop. Pulmonary:      Effort: Pulmonary effort is normal.      Breath sounds: Normal breath sounds. No wheezing, rhonchi or rales. Abdominal:      General: Abdomen is flat. Palpations: Abdomen is soft. Skin:     General: Skin is warm and dry. Neurological:      General: No focal deficit present. Mental Status: She is alert and oriented to person, place, and time.    Psychiatric:         Mood and Affect: Mood normal.         Behavior: Behavior normal.       Raelyn Najjar, DO

## 2023-08-25 ENCOUNTER — TELEPHONE (OUTPATIENT)
Dept: OBGYN CLINIC | Facility: MEDICAL CENTER | Age: 28
End: 2023-08-25

## 2023-08-25 NOTE — TELEPHONE ENCOUNTER
Pt called wanting to schedule an IUD removal, I offered multiple appointments for September for each location we have and also told pt we don't have a cancellation list but she can call back later or Monday morning to see if anything opened up.  Pt then stated "She will take her IUD out by herself " and hung up

## 2023-09-05 ENCOUNTER — TELEPHONE (OUTPATIENT)
Dept: FAMILY MEDICINE CLINIC | Facility: CLINIC | Age: 28
End: 2023-09-05

## 2023-09-05 ENCOUNTER — PATIENT MESSAGE (OUTPATIENT)
Dept: FAMILY MEDICINE CLINIC | Facility: CLINIC | Age: 28
End: 2023-09-05

## 2023-09-05 DIAGNOSIS — Z87.42 HISTORY OF ABNORMAL UTERINE BLEEDING: Primary | ICD-10-CM

## 2023-09-05 DIAGNOSIS — E66.3 OVERWEIGHT (BMI 25.0-29.9): Primary | ICD-10-CM

## 2023-09-05 NOTE — TELEPHONE ENCOUNTER
Patient called and asked if labs can be ordered for upcoming appt with you so they can be review at the time of her appt thanks!

## 2023-11-09 ENCOUNTER — TELEPHONE (OUTPATIENT)
Dept: FAMILY MEDICINE CLINIC | Facility: CLINIC | Age: 28
End: 2023-11-09

## 2023-11-09 NOTE — TELEPHONE ENCOUNTER
Called pt at number listed in chart. No answer. Left voicemail stating that pt's chart had mention of positive pregnancy test. Reccommended that pt stop Wegovy, if she is preganant and has not stopped Wegovy already. Requested that pt let me know the status of her Wegovy use via [employer] or by calling the [facility] office.

## 2023-11-27 LAB
EXTERNAL CHLAMYDIA SCREEN: NEGATIVE
EXTERNAL GONORRHEA SCREEN: NEGATIVE

## 2023-12-15 LAB
CFTR MUT ANL BLD/T: NEGATIVE
EXTERNAL ABO GROUPING: NORMAL
EXTERNAL ANTIBODY SCREEN: NORMAL
EXTERNAL HEMATOCRIT: 32.2 %
EXTERNAL HEMOGLOBIN: 11.5 G/DL
EXTERNAL HEPATITIS B SURFACE ANTIGEN: NORMAL
EXTERNAL HIV-1/2 AB-AG: NORMAL
EXTERNAL PLATELET COUNT: 220 K/ÂΜL
EXTERNAL RH FACTOR: POSITIVE
EXTERNAL RUBELLA IGG QUANTITATION: NORMAL
EXTERNAL SYPHILIS TOTAL IGG/IGM SCREENING: NORMAL

## 2024-01-10 ENCOUNTER — TELEPHONE (OUTPATIENT)
Dept: OBGYN CLINIC | Facility: CLINIC | Age: 29
End: 2024-01-10

## 2024-01-10 NOTE — TELEPHONE ENCOUNTER
Called and informed patient on what medications she can take. Moved patients appointment up to 1/23/24.

## 2024-01-10 NOTE — TELEPHONE ENCOUNTER
Patient called in to report that she will be transferring care to us. Patient is 15 weeks and is reporting that she is having migraines and has been taking her BP at home which has been fine. Last reading was 112/67. Patient would like to know what she can do for the migraines. Please advise.

## 2024-01-20 NOTE — PROGRESS NOTES
"OB/GYN  PN Visit  Delphine Carney  [id number]  2024  8:50 AM  Dr. Nini Cantu MD    S: 29 y.o.  Unknown here for PN visit.       Chief Complaint   Patient presents with    Routine Prenatal Visit     New pt - prenatal - transfer from ; pt states she is 17 wks; pt states that she currently has bronchitis; pt has been having bad headaches, but BP is fine per pt;      She thinks she may have bronchitis  She has been having headaches - Tylenol caffeine      OB complaints:  Contractions: no  Leakage: no  Bleeding: no  Fetal movement: yes        O:  /80 (BP Location: Left arm, Patient Position: Sitting, Cuff Size: Adult)   Pulse 88   Temp 98.3 °F (36.8 °C) (Tympanic)   Ht 5' 5\" (1.651 m)   Wt 85.3 kg (188 lb)   SpO2 99%   BMI 31.28 kg/m²       Review of Systems   Constitutional: Negative.    HENT: Negative.     Eyes: Negative.    Respiratory: Negative.     Cardiovascular: Negative.    Gastrointestinal: Negative.    Endocrine: Negative.    Genitourinary:         As noted in HPI   Musculoskeletal: Negative.    Skin: Negative.    Allergic/Immunologic: Negative.    Neurological: Negative.    Hematological: Negative.    Psychiatric/Behavioral: Negative.           Physical Exam  Constitutional:       General: She is not in acute distress.     Appearance: Normal appearance. She is well-developed.   Abdominal:      Palpations: Abdomen is soft.      Tenderness: There is no abdominal tenderness. There is no guarding.   Neurological:      Mental Status: She is alert and oriented to person, place, and time.   Skin:     General: Skin is warm and dry.   Psychiatric:         Behavior: Behavior normal.             Pregravid Weight/BMI: Pregravid weight not on file (BMI Could not be calculated)  Current Weight: 85.3 kg (188 lb)   Total Weight Gain: Not found.   Pre-Delonte Vitals      Flowsheet Row Most Recent Value   Prenatal Assessment    Fetal Heart Rate 147   Movement Present   Prenatal Vitals    Blood Pressure " 110/80   Weight - Scale 85.3 kg (188 lb)   Urine Albumin/Glucose    Dilation/Effacement/Station    Vaginal Drainage    Edema              Problem List          Respiratory    Asthma       Musculoskeletal and Integument    Congenital bilateral hip instability    Overview     Last Assessment & Plan:   Formatting of this note might be different from the original.  Pt scheduled for elective primary  due to congenital hip issues since birth. Is scheduled for 2020 at noon with Dr. Bernardo.  Labor and delivery and OR aware            Other    Encounter for weight management    Pregnancy headache in second trimester    H/O  section    Overview     Requests repeat           Other Visit Diagnoses       17 weeks gestation of pregnancy    -  Primary           NIPT done - normal male  Declines AFP  Level 2 ultrasound was ordered with MFM  Follow-up in 4 weeks  She is interested in flu vaccine but deferred today since she is not feeling well.  Advised follow-up with PCP or urgent care regarding possible bronchitis and need for treatment if needed.  She states she has tried Robitussin without relief.    Advised intake to be done virtually and return in 4 weeks for regular OB visit  She is planning on repeat .  She had an elective  previously due to problems with her hip.  Discussed we will schedule for 39 weeks.        Future Appointments   Date Time Provider Department Center   2024  1:00 PM Fran Hernandez PA-C Complete  [facility]   2/15/2024  2:30 PM   [facility]   2024  1:00 PM Nini Cantu MD Complete  [facility]                 Nini Cantu MD  2024  8:50 AM

## 2024-01-23 ENCOUNTER — ROUTINE PRENATAL (OUTPATIENT)
Dept: OBGYN CLINIC | Facility: CLINIC | Age: 29
End: 2024-01-23

## 2024-01-23 ENCOUNTER — OFFICE VISIT (OUTPATIENT)
Dept: URGENT CARE | Facility: MEDICAL CENTER | Age: 29
End: 2024-01-23
Payer: COMMERCIAL

## 2024-01-23 VITALS
BODY MASS INDEX: 30.99 KG/M2 | HEIGHT: 65 IN | SYSTOLIC BLOOD PRESSURE: 122 MMHG | DIASTOLIC BLOOD PRESSURE: 70 MMHG | RESPIRATION RATE: 18 BRPM | OXYGEN SATURATION: 99 % | HEART RATE: 78 BPM | WEIGHT: 186 LBS | TEMPERATURE: 98.2 F

## 2024-01-23 VITALS
SYSTOLIC BLOOD PRESSURE: 110 MMHG | DIASTOLIC BLOOD PRESSURE: 80 MMHG | HEART RATE: 88 BPM | OXYGEN SATURATION: 99 % | TEMPERATURE: 98.3 F | BODY MASS INDEX: 31.32 KG/M2 | WEIGHT: 188 LBS | HEIGHT: 65 IN

## 2024-01-23 DIAGNOSIS — R51.9 PREGNANCY HEADACHE IN SECOND TRIMESTER: ICD-10-CM

## 2024-01-23 DIAGNOSIS — Z3A.17 17 WEEKS GESTATION OF PREGNANCY: Primary | ICD-10-CM

## 2024-01-23 DIAGNOSIS — J06.9 VIRAL URI WITH COUGH: Primary | ICD-10-CM

## 2024-01-23 DIAGNOSIS — Z98.891 H/O CESAREAN SECTION: ICD-10-CM

## 2024-01-23 DIAGNOSIS — O26.892 PREGNANCY HEADACHE IN SECOND TRIMESTER: ICD-10-CM

## 2024-01-23 PROCEDURE — PNV: Performed by: OBSTETRICS & GYNECOLOGY

## 2024-01-23 PROCEDURE — 99213 OFFICE O/P EST LOW 20 MIN: CPT | Performed by: STUDENT IN AN ORGANIZED HEALTH CARE EDUCATION/TRAINING PROGRAM

## 2024-01-23 RX ORDER — BENZONATATE 100 MG/1
100 CAPSULE ORAL 3 TIMES DAILY PRN
Qty: 40 CAPSULE | Refills: 0 | Status: SHIPPED | OUTPATIENT
Start: 2024-01-23 | End: 2024-01-30 | Stop reason: ALTCHOICE

## 2024-01-23 RX ORDER — METOCLOPRAMIDE 10 MG/1
10 TABLET ORAL 3 TIMES DAILY PRN
Qty: 30 TABLET | Refills: 1 | Status: SHIPPED | OUTPATIENT
Start: 2024-01-23

## 2024-01-23 NOTE — PATIENT INSTRUCTIONS
To download a copy of pregnancy essentials guide:    [email or web address]        Hospital Affiliation & Directions    [address] [address]    Warning Signs During Pregnancy  The list below includes warning signs your providers would like you to be aware of.  If you experience any of these at any time during your pregnancy, please call us as soon as possible.     Vaginal bleeding   Sharp abdominal pain that does not go away   Fever (more than 100.4?F and is not relieved with Tylenol)   Persistent vomiting lasting greater than 24 hours   Chest pain   Pain or burning when you urinate   Severe headache that doesn’t resolve with Tylenol   Blurred vision or seeing spots in your vision   Sudden swelling of your face or hands   Redness, swelling or pain in a leg   A sudden weight gain in just a few days   Decrease in your baby’s movements (after 28 weeks or the 6th month of pregnancy)   A loss of watery fluid from your vagina - can be a gush, a trickle or  continuous wetness   After 20 weeks of pregnancy, rhythmic cramping (greater than 4 per hour)  or menstrual like low/pelvic pain    Call the OFFICE [phone] for any questions/emergencies.  At night or on the weekend, please indicate it is an emergency and the DOCTOR on call will be paged.    Discomforts of Early Pregnancy    Tips for coping with nausea and  vomiting during pregnancy   Eat meals and snacks slowly   Eat every 1-2 hours to avoid a full stomach   Don’t skip meals, avoid empty stomach   Eat a snack prior to getting out of bed   Avoid food and beverages with a strong aroma   Avoid dehydration - drink enough fluid to keep the urine pale yellow   Drink fluids before a meal to minimize the effect of a full stomach   Limit the amount of coffee and beverages that contain caffeine   Eliminate spicy, odorous, high fat (fried foods), acidic (tomato products) and sweet  foods   Fluids that contain lemon (lemonade), mint (tea) or orange can usually be well tolerated   Snacks and meals that contain low-fat protein (lean meats, fish, poultry and eggs) along with eating easily digestible carbs (fruit, rice, toast, crackers and dry cereal) may be tolerated better   Foods with ginger may be well tolerated. May use ginger root powder, capsules or extract (up to 1000 mg per day)   Drink liquids in small amounts    If symptoms persist, please contact your provider.      Tips for coping with constipation during pregnancy   Increase fiber and fluids.  - Drink 8-10 cups of liquid, like water or low-sugar juice daily  - Keep urine pale with fluids (water, milk), fruit and vegetables   Eat a well-balanced diet that contains high fiber food (fruits, vegetables, whole grain breads and cereals, bran and dried beans)   Take a 30-minute walk daily   You may take a mild stool softener such as Colace®    If symptoms persist, please contact your provider.      For any emergencies, PLEASE CALL THE OFFICE at [phone]. If the office is closed, the doctor on call will be paged by the answering service.    Medications and Pregnancy    Colds/Sore Throat   Robitussin DMR - Plain (guaifenesin)   Saline nasal spray   Warm salt water gargle   CepacolR throat lozenges or mouthwash (cetylpyridinium chloride)   SucretsR (hexylresoricinol, dyclonone HC1)   SudafedR (pseudoephedrine, phenylephnine reformulated) for colds or congestion. May take after 13 weeks of pregnancy.    Allergy  Avoid the “D” - or Decongestant   ClaritinR (loratadine)   ZrytecR (cetirizine)   AllegraR (fexofenadine)    Headache /Aches and Pains   TylenolR (acetaminophen)  Do not exceed more than 3000 mg of Tylenol in a 24-hour period.    Heartburn   MylantaR (aluminum hydroxide/simethicone, magnesium hydroxide)   MaaloxR (aluminum magnesium hydroxide, magnesium hydroxide)   TumsR (calcium carbonate)   RiopanR (magaldrate)    Constipation    ColaceR (docusate sodium)   SurfakR (docusate calcium)   MiraLAXR   Glycerin suppositories   FleetsR enema (sodium phosphate &sodium biphosphate)  Avoid enemas in the third trimester if you have had  labor contractions.    Nausea /Vomiting   Vitamin B6 (pyridoxine) - May take 50 mg at bedtime, 25 mg in the am, 25 mg in the afternoon   UnisomR (doxylamine) - May use for nausea/vomiting - take 12.5 mg every 8 hours as needed  (cut a 25 mg tablet in half). May cause drowsiness.   Ginger capsules daily - Total 1000 mg daily    Sleep   BenadrylR (diphenhydramine) - Take 1-2 tablets as needed at bedtime   UnisomR (doxylamine) 25 mg tablet - As needed at bedtime   Melatonin 5 mg tablet - As needed at bedtime    Expected Weight Gain During Pregnancy  If you have a healthy BMI (18-25) prior to pregnancy:  The recommended weight gain is between 25-35 pounds. Approximate weight gain  in the first trimester is 1-4.5 pounds. An expected weight gain during the second and  third trimester is approximately one pound per week.  If you have a BMI of less than 18 prior to pregnancy,  you are considered underweight:  The recommended weight gain is between 28-40 pounds. Approximate weight gain  in the first trimester is 1-4.5 pounds. An expected weight gain during the second and  third trimester is just over one pound per week.  If your BMI is 25 to 29.9: you are considered overweight:  You should gain 1/2 to 2/3 pound during the second and third trimester, for a total  weight gain of 15 to 25 pounds.  If you have a BMI of greater than 30 prior to pregnancy,  you are considered overweight:  The recommended weight gain is between 15-25 pounds. Approximate weight gain  in the first trimester is 1-4.5 pounds. An expected weight gain during the second  and third trimester is approximately 0.5 pound per week.    Foods to avoid during pregnancy:   Unpasteurized milk, juice and cheese  - Soft cheeses like feta or brie (if made with  UNPASTEURIZED milk)   Unheated deli meats like lunchmeat and hotdogs   Undercooked poultry, beef, pork, seafood including raw sushi    What fish is safe to eat during pregnancy?  Eat 8 to 12 ounces of fish a week. Pick from this group frequently, especially if you follow  the American Heart Association’s recommendation to eat fish at least 2 times a week.    AVOID HIGH MERCURY FISH  A single meal of the following fish can put  you over the Environmental Protection  Agency’s safe limit for the month.  High mercury fish:  Shark  Swordfish  Zen Mackerel  Tile Fish    Caffeine and Pregnancy    The March of D[employer] and American College of Obstetrics and Gynecologists (ACOG) urge pregnant  women to limit their caffeine consumption to no more than 200 milligrams (mg) per day. This is  comparable to having one 12-ounce cup of coffee a day. This level has been shown not to increase risk  of miscarriage, growth or  labor complications. Effects of higher levels are not known.    Exercise During Pregnancy  A daily exercise program that consists of 30 minutes a day is recommended.   Low impact exercises like walking and swimming are great exercises throughout  all of pregnancy   If you’re an avid strength  avoid lifting very heavy weights - nothing more  than 30 pounds    Drink plenty of fluids while exercising to stay hydrated.  Be careful to avoid overheating.    ACTIVITIES TO AVOID   Exercises that can make you lose your balance.   Activities that can put your baby at risk i.e. horseback riding, scuba diving, skiing  or snowboarding. Any other sport that puts you at risk for getting hit in the  abdominal area.   Do not use saunas, steam rooms or hot tubs (that have a higher temperature  than 100F)   After the first trimester, avoid exercises that require you to lay flat on your back.   Avoid exceeding a heart rate greater than 140 beats per minute. As long as you are  able to hold a conversation while exercising  your heart rate is likely acceptable    Vaccines and Pregnancy    Information for pregnant women  Vaccines help protect you and your baby against serious diseases.  Whooping Cough Vaccine  Whooping cough (or pertussis) can be serious for anyone, but for your , it can be lifethreatening.  Up to 20 babies die each year in the [place] due to whooping cough.   When you get the whooping cough vaccine during your pregnancy, your body will create protective  antibodies and pass some of them to your baby before birth. These antibodies will provide your  baby some short-term, early protection against whooping cough.  Learn more at www.cdc.gov/pertussis/pregnant/.    Flu Vaccine  Changes in your immune, heart, and lung functions during pregnancy make you more likely to get  seriously ill from the flu. Catching the flu also increases your chances for serious problems for your  developing baby, including premature labor and delivery. Get the flu shot if you are pregnant during  flu season--it’s the best way to protect yourself and your baby for several months after birth from flu-related  complications.  Flu seasons vary in their timing from season to season, but CDC recommends getting vaccinated  by the end of October, if possible. This timing helps protect you before flu activity begins to increase.  Find more on how to prevent the flu by visiting www.cdc.gov/flu/.       Labor  Learning the signs and symptoms of  labor may help your baby from being born too early.  labor means that you have started the  labor process before the 37th week of pregnancy. Babies born before the 37th week of pregnancy may need to stay in the hospital longer and may  have additional health problems.    The signs of  labor   Abdominal tightening - more than 4-6 in an hour   Vaginal bleeding, increase vaginal discharge or a change in color of your vaginal discharge   Increased pelvic pressure - feeling like  the baby is pushing down   Low, dull backache - may be a backache that comes and goes   Menstrual type cramping    If you experience any one of the signs of  labor, drink  1 quart of water. If you have no improvement in 1 hour, call the office.  Acting quickly is the best thing that you can do for your baby.    The office staff may have the following suggestions:   You may be asked to come in to the office or go to the hospital   You may be told to stop the activity that you had previously been doing   Rest with your feet up for 1 hour   Drink 2-3 glasses of water or juice - dehydration can cause     After speaking with the office staff and following their advice, if you’re still  experiencing the signs of  labor, call again. If the signs of   labor have subsided, rest for the remainder of the day.      Fetal kick counts    You will most likely start to feel your baby move (also known as quickening) between  18 and 20 weeks of pregnancy. First time moms might feel their baby’s movements  closer to 25 weeks while a second time mom might feel those first movements closer  to 16 weeks.    At 28 weeks counting your baby’s movements is one way of knowing that your baby  is doing well. A healthy baby usually moves (kicks, flutters, or rolls) at least 10 times in  2 hours during an active time, once a day. This is called a kick count.    How do I count my baby’s movements?   Choose one time of day when your baby is most active. Try to do this around the same time each day.   Get into a comfortable position (lie down on your side or sit in a chair with your feet up).   The first time that the baby moves, write down the time.   Count each movement until the baby moves 10 times. These movements include kicks, punches, nudges, flutters, or rolls. This can take from 5 minutes to 2 hours.   Write down the time you feel the baby’s 10th movement.  What should I do if I can’t feel my baby move?  Call the  office right away if you notice:   Your baby has not moved 10 times in the 2 hour time period   A significant decrease in your baby’s movements   Your baby has not moved all day    How Will I Know if I’m in Labor?   Abdominal contractions will be strong and regular.   The contractions will be strong enough that it will be difficult to walk, talk or breathe through them.   Your water breaks - may be a gush or a slow leak.  - To differentiate between amniotic fluid or urine perform a kegel exercise.  - If it’s urine, when performing a kegel, the flow of urine will stop  - If it’s amniotic fluid, when performing a kegel, the flow of fluid will not stop     For a first time mom, think of the 511 rule.  - Your contractions are 5 minutes apart  - The contractions last 1 minutes each  - The contractions have been in that pattern for 1 hour   For a mom who has been through the birth process before  - Contractions that occur every 7-8 minutes apart and are becoming progressively  more uncomfortable    Please always call the office prior to going to the hospital.     Pregnancy at 19 to 22 Weeks   WHAT YOU NEED TO KNOW:   Now that you are in your second trimester, you have more energy. You may also be feeling hungrier than usual. You may be gaining about ½ to 1 pound a week, and your pregnancy is beginning to show. You may need to start wearing maternity clothes. As your baby gets larger, you may have other symptoms. These may include body aches or stretch marks on your abdomen, breasts, thighs, or buttocks.  DISCHARGE INSTRUCTIONS:   Return to the emergency department if:   You develop a severe headache that does not go away.    You have new or increased vision changes, such as blurred or spotted vision.    You have new or increased swelling in your face or hands.    You have vaginal spotting or bleeding.    Your water broke or you feel warm water gushing or trickling from your vagina.    Call your doctor or obstetrician if:    You have abdominal cramps, pressure, or tightening.    You have a change in vaginal discharge.    You cannot keep food or drinks down, and you are losing weight.    You have chills or a fever.    You have vaginal itching, burning, or pain.    You have yellow, green, white, or foul-smelling vaginal discharge.    You have pain or burning when you urinate, less urine than usual, or pink or bloody urine.    You have questions or concerns about your condition or care.    How to care for yourself at this stage of your pregnancy:       Eat a variety of healthy foods.  Healthy foods include fruits, vegetables, whole-grain breads, low-fat dairy foods, beans, lean meats, and fish. Drink liquids as directed. Ask how much liquid to drink each day and which liquids are best for you. Limit caffeine to less than 200 milligrams each day. Limit your intake of fish to 2 servings each week. Choose fish low in mercury such as canned light tuna, shrimp, salmon, cod, or tilapia. Do not  eat fish high in mercury such as swordfish, tilefish, alice mackerel, and shark.         Take prenatal vitamins as directed.  Your need for certain vitamins and minerals, such as folic acid, increases during pregnancy. Prenatal vitamins provide some of the extra vitamins and minerals you need. Prenatal vitamins may also help to decrease the risk of certain birth defects.         Talk to your healthcare provider about exercise.  Moderate exercise can help you stay fit. Your healthcare provider will help you plan an exercise program that is safe for you during pregnancy.         Do not smoke.  Smoking increases your risk of a miscarriage and other health problems during your pregnancy. Smoking can cause your baby to be born too early or weigh less at birth. Ask your healthcare provider for information if you need help quitting.    Do not drink alcohol.  Alcohol passes from your body to your baby through the placenta. It can affect your baby's brain  development and cause fetal alcohol syndrome (FAS). FAS is a group of conditions that causes mental, behavior, and growth problems.    Talk to your healthcare provider before you take any medicines.  Many medicines may harm your baby if you take them when you are pregnant. Do not take any medicines, vitamins, herbs, or supplements without first talking to your healthcare provider. Never use illegal or street drugs (such as marijuana or cocaine) while you are pregnant.  Safety tips during pregnancy:   Avoid hot tubs and saunas.  Do not use a hot tub or sauna while you are pregnant, especially during your first trimester. Hot tubs and saunas may raise your baby's temperature and increase the risk of birth defects.    Avoid toxoplasmosis.  This is an infection caused by eating raw meat or being around infected cat feces. It can cause birth defects, miscarriages, and other problems. Wash your hands after you touch raw meat. Make sure any meat is well-cooked before you eat it. Avoid raw eggs and unpasteurized milk. Use gloves or ask someone else to clean your cat's litter box while you are pregnant.       Changes happening with your baby:  By 22 weeks, your baby is about 8 inches long from the top of the head to the rump (baby's bottom). Your baby also weighs about 1 pound. Your baby is becoming much more active. You may be able to feel the baby move inside you now. The first movements may not be that noticeable. They may feel like a fluttering sensation. As time goes on, your baby's movements will become stronger and more noticeable.  What you need to know about prenatal care:  During the first 28 weeks of your pregnancy, you will see your healthcare provider once a month. Your healthcare provider will check your blood pressure and weight. You may also need the following:  A urine test  may also be done to check for sugar and protein. These can be signs of gestational diabetes or infection. Protein in your urine may  also be a sign of preeclampsia. Preeclampsia is a condition that can develop during week 20 or later of your pregnancy. It causes high blood pressure, and it can cause problems with your kidneys and other organs.    Fundal height  is a measurement of your uterus to check your baby's growth. This number is usually the same as the number of weeks that you have been pregnant.    A fetal ultrasound  shows pictures of your baby inside your uterus. It shows your baby's development. The movement and position of your baby can also be seen. Your healthcare provider may be able to tell you what your baby's gender is during the ultrasound.         Your baby's heart rate  will be checked.    Follow up with your obstetrician as directed:  Write down your questions so you remember to ask them during your visits.  © Copyright [employer] 2023 Information is for End User's use only and may not be sold, redistributed or otherwise used for commercial purposes.  The above information is an  only. It is not intended as medical advice for individual conditions or treatments. Talk to your doctor, nurse or pharmacist before following any medical regimen to see if it is safe and effective for you.

## 2024-01-23 NOTE — PROGRESS NOTES
[facility]        NAME: Delphine Carney is a 29 y.o. female  : 1995    MRN: [id number]    Assessment and Plan   Viral URI with cough [J06.9]  1. Viral URI with cough  benzonatate (TESSALON PERLES) 100 mg capsule        Low suspicion for bacterial or intrapulmonary etiology.  COVID is negative.  Discussed symptomatic and supportive measures for management.  Provided warning signs.  Tessalon Perles have limited data on safety in pregnancy so I did instruct patient to check with OB/GYN.    Patient Instructions     See wrap up for details  Follow up with PCP in 3-5 days.  Proceed to  ER if symptoms worsen.    Chief Complaint     Chief Complaint   Patient presents with    Cough     Cough started on Friday     Cold Like Symptoms     Runny nose started on Thursday with a low grade fever          History of Present Illness       Cough  This is a new problem. The current episode started in the past 7 days. The problem has been rapidly worsening. The problem occurs every few minutes. The cough is Non-productive and productive of sputum. Associated symptoms include nasal congestion and rhinorrhea. Pertinent negatives include no chest pain, chills, ear congestion, ear pain, fever, headaches, heartburn, hemoptysis, myalgias, postnasal drip, rash, sore throat, shortness of breath, sweats, weight loss or wheezing. The symptoms are aggravated by lying down. Risk factors for lung disease include animal exposure.       Today is day 5 of symptoms  Reports cough was productive but is now nonproductive  Currently 6 months pregnant   Tmax 100.4F initially but has since resolved and none for 3 days  Reports congestion, rhinorrhea  Denies sinus pressure, nausea, vomiting, myalgias  Son with similar symptoms, tested neg for covid with PCP. She has been testing herself daily and has been negative including this morning  Taking robitussin with no relief.   Currently about 6 months pregnant    Review of Systems   Review of  Systems   Constitutional:  Negative for chills, fever and weight loss.   HENT:  Positive for congestion and rhinorrhea. Negative for ear pain, postnasal drip and sore throat.    Eyes:  Negative for pain and visual disturbance.   Respiratory:  Positive for cough. Negative for hemoptysis, chest tightness, shortness of breath and wheezing.    Cardiovascular:  Negative for chest pain and palpitations.   Gastrointestinal:  Negative for abdominal pain, constipation, diarrhea, heartburn, nausea and vomiting.   Genitourinary:  Negative for dysuria, hematuria and menstrual problem.   Musculoskeletal:  Negative for arthralgias, back pain and myalgias.   Skin:  Negative for color change and rash.   Neurological:  Negative for seizures, syncope and headaches.   Psychiatric/Behavioral:  Negative for dysphoric mood and suicidal ideas.    All other systems reviewed and are negative.      Current Medications       Current Outpatient Medications:     albuterol (PROVENTIL HFA,VENTOLIN HFA) 90 mcg/act inhaler, Inhale 2 puffs every 6 (six) hours as needed, Disp: , Rfl:     benzonatate (TESSALON PERLES) 100 mg capsule, Take 1 capsule (100 mg total) by mouth 3 (three) times a day as needed for cough Can take 2 tabs at once also, Disp: 40 capsule, Rfl: 0    cetirizine (ZyrTEC) 10 mg tablet, Take 10 mg by mouth daily, Disp: , Rfl:     fluticasone (FLONASE) 50 mcg/act nasal spray, 1 spray into each nostril daily, Disp: , Rfl:     metoclopramide (Reglan) 10 mg tablet, Take 1 tablet (10 mg total) by mouth 3 (three) times a day as needed (headache), Disp: 30 tablet, Rfl: 1    ALPRAZolam (XANAX) 0.5 mg tablet, Take 1 tab 30-60 minutes prior to procedure, repeat if necessary (Patient not taking: Reported on 1/23/2024), Disp: 2 tablet, Rfl: 0    EPINEPHrine (EPIPEN) 0.3 mg/0.3 mL SOAJ, Inject 0.3 mL (0.3 mg total) into a muscle once for 1 dose, Disp: 0.6 mL, Rfl: 5    montelukast (SINGULAIR) 10 mg tablet, Take 1 tablet (10 mg total) by mouth  daily at bedtime (Patient not taking: Reported on 2024), Disp: 30 tablet, Rfl: 2    naloxone (NARCAN) 4 mg/0.1 mL nasal spray, Administer 1 spray into a nostril. If no response after 2-3 minutes, give another dose in the other nostril using a new spray. (Patient not taking: Reported on 2024), Disp: 1 each, Rfl: 1    oxyCODONE-acetaminophen (Percocet) 5-325 mg per tablet, Take 1 pill prior to procedure, repeat if necessary (Patient not taking: Reported on 2024), Disp: 2 tablet, Rfl: 0    Prenatal Vit-Fe Fumarate-FA (PRENATAL 1 PLUS 1 PO), Take 1 tablet by mouth daily (Patient not taking: Reported on 2024), Disp: , Rfl:     Probiotic Product (Probiotic Daily) CAPS, Take 1 capsule by mouth 3 (three) times a day (Patient not taking: Reported on 2024), Disp: , Rfl:     Current Allergies     Allergies as of 2024 - Reviewed 2024   Allergen Reaction Noted    Nuts - food allergy Throat Swelling 10/30/2019    Other Itching 2019    Soybean-containing drug products - food allergy GI Intolerance 10/30/2019    Apple - food allergy  2014    Shellfish allergy - food allergy  2014    Short ragweed pollen ext  2011    Tree extract  2011    Uncaria tomentosa (cats claw) Allergic Rhinitis 2020            The following portions of the patient's history were reviewed and updated as appropriate: allergies, current medications, past family history, past medical history, past social history, past surgical history and problem list.     Past Medical History:   Diagnosis Date    Asthma     Known health problems: none 2014    Last assessed       Past Surgical History:   Procedure Laterality Date     SECTION      NO PAST SURGERIES         Family History   Problem Relation Age of Onset    Allergy (severe) Mother         Mold    Seizures Sister     Thyroid disease Sister          Medications have been verified.        Objective   /70   Pulse 78   Temp  "98.2 °F (36.8 °C)   Resp 18   Ht 5' 5\" (1.651 m)   Wt 84.4 kg (186 lb)   SpO2 99%   BMI 30.95 kg/m²        Physical Exam     Physical Exam  Constitutional:       General: She is not in acute distress.     Appearance: Normal appearance.   HENT:      Head: Normocephalic and atraumatic.      Right Ear: A middle ear effusion is present. Tympanic membrane is not erythematous or bulging.      Left Ear: A middle ear effusion is present. Tympanic membrane is not erythematous or bulging.      Nose: Congestion present.      Mouth/Throat:      Mouth: Mucous membranes are moist.      Pharynx: Oropharynx is clear. No posterior oropharyngeal erythema.   Eyes:      Extraocular Movements: Extraocular movements intact.      Conjunctiva/sclera: Conjunctivae normal.   Cardiovascular:      Rate and Rhythm: Normal rate and regular rhythm.   Pulmonary:      Effort: Pulmonary effort is normal. No respiratory distress.      Breath sounds: Normal breath sounds. No stridor. No wheezing, rhonchi or rales.   Musculoskeletal:      Cervical back: Normal range of motion.   Skin:     General: Skin is warm and dry.   Neurological:      General: No focal deficit present.      Mental Status: She is alert and oriented to person, place, and time.   Psychiatric:         Mood and Affect: Mood normal.         Behavior: Behavior normal.           "

## 2024-01-26 NOTE — PROGRESS NOTES
The patient was identified by name and date of birth. The patient was informed that this is a telemedicine visit and that the visit is being conducted through [employer] and patient was informed this is a secure, HIPAA-complaint platform. She agrees to proceed..  My office door was closed. No one else was in the room.  She acknowledged consent and understanding of privacy and security of the video platform. The patient has agreed to participate and understands they can discontinue the visit at any time.    Patient location:  [place]  Contact phone number in case call is disconnected: confirmed      29 y.o. y/o female here for OB intake.     TV u/s done 11/27/23 confirms SLIUP 9w0d, final EDC 7/1/24 by US.        Current Outpatient Medications on File Prior to Visit   Medication Sig Dispense Refill    albuterol (PROVENTIL HFA,VENTOLIN HFA) 90 mcg/act inhaler Inhale 2 puffs every 6 (six) hours as needed      ALPRAZolam (XANAX) 0.5 mg tablet Take 1 tab 30-60 minutes prior to procedure, repeat if necessary (Patient not taking: Reported on 1/23/2024) 2 tablet 0    benzonatate (TESSALON PERLES) 100 mg capsule Take 1 capsule (100 mg total) by mouth 3 (three) times a day as needed for cough Can take 2 tabs at once also 40 capsule 0    cetirizine (ZyrTEC) 10 mg tablet Take 10 mg by mouth daily      EPINEPHrine (EPIPEN) 0.3 mg/0.3 mL SOAJ Inject 0.3 mL (0.3 mg total) into a muscle once for 1 dose 0.6 mL 5    fluticasone (FLONASE) 50 mcg/act nasal spray 1 spray into each nostril daily      metoclopramide (Reglan) 10 mg tablet Take 1 tablet (10 mg total) by mouth 3 (three) times a day as needed (headache) 30 tablet 1    montelukast (SINGULAIR) 10 mg tablet Take 1 tablet (10 mg total) by mouth daily at bedtime (Patient not taking: Reported on 1/23/2024) 30 tablet 2    naloxone (NARCAN) 4 mg/0.1 mL nasal spray Administer 1 spray into a nostril. If no response after 2-3 minutes, give another dose in the other nostril using  a new spray. (Patient not taking: Reported on 2024) 1 each 1    oxyCODONE-acetaminophen (Percocet) 5-325 mg per tablet Take 1 pill prior to procedure, repeat if necessary (Patient not taking: Reported on 2024) 2 tablet 0    Prenatal Vit-Fe Fumarate-FA (PRENATAL 1 PLUS 1 PO) Take 1 tablet by mouth daily (Patient not taking: Reported on 2024)      Probiotic Product (Probiotic Daily) CAPS Take 1 capsule by mouth 3 (three) times a day (Patient not taking: Reported on 2024)       No current facility-administered medications on file prior to visit.       Past Surgical History:   Procedure Laterality Date     SECTION      NO PAST SURGERIES         Past Medical History:   Diagnosis Date    Asthma     Known health problems: none 2014    Last assessed         Genetic screen:  Canavan disease- negative  Cerebral palsy- negative  Cleft lip/palate- negative  Congenital anomalies- negative  Congenital heart disease- negative  Consanguinity- negative  Cystic fibrosis- negative  Down's syndrome- negative  Hemophilia- negative  Nancy's chorea- negative  Mental retardation/ intellectual disability/ cognitive delays- negative  Muscular dystrophy- negative  Neural tube defect- negative  Sickle cell anemia- negative  Sohan-sachs disease- negative  Fragile X- negative  Thalassemia- negative  Autism- negative  Type 1 diabetes- negative  PKU- negative  Premature ovarian failure- negative      OB History          2    Para   1    Term   1            AB        Living   1         SAB        IAB        Ectopic        Multiple        Live Births   1                 Previous pregnancy history/ complications: none    Age of patient: 29  Age of father of the baby: 27, Nacho Smithmarco    Exposure risk:  Occupation: realtor  Exposure chemicals/radiation:no  Alcohol exposure: no  Medications taking since LMP:see med list  Drug exposure:no  Smoker: no  Cat litter exposure: no    Depression screen:   negative  Domestic violence screen: negative      TB screening:   HIV-no  Close contact with individuals with known or suspected TB-no  Medical conditions known to increase risk of disease if infected  Ie. Diabetes, lupus, cancer, alcoholism, drug addiction- no  Birth in or emigration from high prevalent countries ([place], [place], [place], [place], [place], [place], [place], [place], [place]- no  Medically underserved- no  Homeless- no  Living or working in long term care facilities - no    DRUG screening:  Parents:  Did any of your parents have a problem with alcohol or other drug use?  no    Partner: Does your partner have a problem with alcohol or drug use? no    Past: In the past, have you had difficulties in your life because of alcohol or other drugs, including prescription medication?  no    Present: In the past month have you drunk any alcohol or used other drugs?  no    Peers: Do any of your peers (friends, roommates, co-workers, etc) have a problem with alcohol or drug use? no    Cravings:  During this pregnancy, have you experienced cravings for substances such as opioids or methamphetamines? no    Substitute: During this pregnancy, have you taken or purchased buprenorphine (subutex or suboxone) that was prescribed to someone else? no    Thyroid disease risk:  BMI >30: no  Type 1 diabetes: no  Fhx or personal hx of thyroid disease: + fhx    Diabetes risk screening:   BMI 30 or greater: no  Hx of macrosomia ( infant weight 9 lb or greater): no  Previous GDM hx: no  Hx PCOS or insulin resistance: no  Hx of elevated HgbA1c, glucose tolerance, fasting glucose: no    HTN: no  Hx elevated HDL/ triglycerides:no  1st degree relative with diabetes: no  Hx cardiovascular ds: no  , , ,  american, : no      Glucola ordered: no    Other screening:  Ashkenazi Rastafarian/ Slovenian [place]/ Cajun descent: no, abisai-sachs screening offered: no    Hx of gastric  bypass/ gastric sleeve/ gastric band: no    Hx of HSV for patient or partner: no    Hx of MRSA: no    Last pap: 6/28/23  Hx of abnormal pap smear: no  Hx of procedures to the cervix: no    Hx of chlamydia/ gonorrhea/ PID: no    Hx recurrent pregnancy loss/ stillbirth: no    Was this pregnancy a result of infertility treatment: no    Vaccine Hx:  Varicella: + vaccine  Flu vaccine: not completed  Hep B vaccine: completed   COVID vaccine :  completed x 4    Hx of covid in last 3 months: no    ASA/ PEC screen:  Previous uncomplicated full-term delivery: no  Multifetal gestation- 2 points: 0  Chronic HTN- 2 points: 0  Type 1 or 2 pre-gestational diabetes- 2 points: 0  Renal disease- 2 points: 0  Autoimmune disease- 2 points: 0  History of preeclampsia- 2 points: 0  IVF pregnancy- 1 point: 0  >10 year pregnancy interval-1 point: 0  Previous pregnancy with IUGR/ SGA/ adverse pregnancy outcome-1 point: 0  Nulliparity- 1 point: 0  BMI >30- 1 point: 0  Family history of preeclampsia in mother or sister- 1 point: 0  Age >or = 35- 1 point: 0   Race- 1 point: 0  Low socioeconomic status-1 point: 0    TOTAL SCORE: 0    C  Pertinent + Pre eclampsia risk factors: no  Pt has been recommended to take ASA 162mg during this pregnancy to lower her risk of preeclampsia: no    Other:  Pre pregnancy weight: 177 lb    Ok with blood transfusion if needed: yes    Plans for feeding baby: breast    Blood type: A positive    Hemoglobin electrophoresis completed in past: no    Preferred lab: [facility]    ONAF form needed: no        PROBLEM LIST includes:    Hx of gHTN:  started on meds at 31 wks gestation.   1st trimester CMP completed  Asthma  Hx csection  Bilateral congenital hip instability.    -Pregnancy: H&P completed today.       Prenatal course discussed with patient, including appointment schedule. Warning signs discussed and reviewed.    -US:  appt with MFM scheduled 2/15/24    -Carrier screening including Cystic  fibrosis and spinal muscular atrophy reviewed: patient completed testing.  She will send copy of results    -MsAFP ordered ordered    -Reviewed benefits of influenza vaccination in pregnancy including but not limited to reduction in maternal influenza hospitalization, reduction in risk of stillbirth, and reduction in influenza-related morbidity and mortality among infants. Reviewed safety of influenza vaccine in pregnancy and overall very low risk of reaction or adverse effects. Patient voiced understanding of all this.

## 2024-01-29 PROBLEM — Z3A.18 18 WEEKS GESTATION OF PREGNANCY: Status: ACTIVE | Noted: 2024-01-29

## 2024-01-30 ENCOUNTER — TELEMEDICINE (OUTPATIENT)
Dept: OBGYN CLINIC | Facility: CLINIC | Age: 29
End: 2024-01-30

## 2024-01-30 DIAGNOSIS — O09.892 SUPERVISION OF OTHER HIGH RISK PREGNANCIES, SECOND TRIMESTER: Primary | ICD-10-CM

## 2024-01-30 DIAGNOSIS — Z3A.18 18 WEEKS GESTATION OF PREGNANCY: ICD-10-CM

## 2024-01-30 PROBLEM — Z76.89 ENCOUNTER FOR WEIGHT MANAGEMENT: Status: RESOLVED | Noted: 2023-08-14 | Resolved: 2024-01-30

## 2024-01-30 PROBLEM — Z87.59 HISTORY OF GESTATIONAL HYPERTENSION: Status: ACTIVE | Noted: 2024-01-30

## 2024-01-30 PROBLEM — O26.892 PREGNANCY HEADACHE IN SECOND TRIMESTER: Status: RESOLVED | Noted: 2024-01-23 | Resolved: 2024-01-30

## 2024-01-30 PROBLEM — R51.9 PREGNANCY HEADACHE IN SECOND TRIMESTER: Status: RESOLVED | Noted: 2024-01-23 | Resolved: 2024-01-30

## 2024-01-30 PROCEDURE — OBC: Performed by: PHYSICIAN ASSISTANT

## 2024-01-30 NOTE — PATIENT INSTRUCTIONS
"Again, congratulations on your pregnancy!  NEXT STEPS  Get Prenatal bloodwork if not already done  Call MFM to schedule next ultrasound (done 12-13 wks)   Contact information for [facility] ([facility])- Main Number [phone]   Return to our office in 4 weeks for routine prenatal visit (or sooner if any problems/concerns arise- see packet for things to report)  Check out [facility] website to read the \"Pregnancy Essentials Guide\"      It can be found by going to [place]-->select services-->select women's health-->select Obstetrics  [email or web address]  ----------------------------------------------------  Hospital Affiliation & [place] [address]    Warning Signs During Pregnancy  The list below includes warning signs your providers would like you to be aware of.  If you experience any of these at any time during your pregnancy, please call us as soon as possible.     Vaginal bleeding   Sharp abdominal pain that does not go away   Fever (more than 100.4?F and is not relieved with Tylenol)   Persistent vomiting lasting greater than 24 hours   Chest pain/Shortness of breath   Pain or burning when you urinate    Call the OFFICE [phone] for any questions/emergencies.  At night or on the weekend, calls go through a triage service, please indicate it is an emergency and the DOCTOR on call will be paged.  ---------------------------------------------------------------    Discomforts of Early Pregnancy  Tips for coping with nausea and vomiting during pregnancy   Eat meals and snacks slowly   Eat every 1-2 hours to avoid a full stomach   Don’t skip meals, avoid empty stomach   Eat a snack prior to getting out of bed   Avoid food and beverages with a strong aroma   Avoid dehydration - drink enough fluid to keep the urine pale yellow   Drink fluids before a meal to minimize " the effect of a full stomach   Limit the amount of coffee and beverages that contain caffeine   Eliminate spicy, odorous, high fat (fried foods), acidic (tomato products) and sweet foods   Fluids that contain lemon (lemonade), mint (tea) or orange can usually be well tolerated   Snacks and meals that contain low-fat protein (lean meats, fish, poultry and eggs) along with eating easily digestible carbs (fruit, rice, toast, crackers and dry cereal) may be tolerated better   Foods with ginger may be well tolerated. May use ginger root powder, capsules or extract (up to 1000 mg per day)   Drink liquids in small amounts    If symptoms persist, please contact your provider.      Tips for coping with constipation during pregnancy   Increase fiber and fluids.  - Drink 8-10 cups of liquid, like water or low-sugar juice daily  - Keep urine pale with fluids (water, milk), fruit and vegetables   Eat a well-balanced diet that contains high fiber food (fruits, vegetables, whole grain breads and cereals, bran and dried beans)   Take a 30-minute walk daily   You may take a mild stool softener such as Colace®    If symptoms persist, please contact your provider.      For any emergencies, PLEASE CALL THE OFFICE at [phone]. If the office is closed, the doctor on call will be paged by the answering service.    Medications and Pregnancy- see handout in packetExpected Weight Gain During Pregnancy  If you have a healthy BMI (18-25) prior to pregnancy:  The recommended weight gain is between 25-35 pounds. Approximate weight gain  in the first trimester is 1-4.5 pounds. An expected weight gain during the second and  third trimester is approximately one pound per week.  If you have a BMI of less than 18 prior to pregnancy,  you are considered underweight:  The recommended weight gain is between 28-40 pounds. Approximate weight gain  in the first trimester is 1-4.5 pounds. An expected weight gain during the second and  third trimester  is just over one pound per week.  If your BMI is 25 to 29.9: you are considered overweight:  You should gain 1/2 to 2/3 pound during the second and third trimester, for a total  weight gain of 15 to 25 pounds.  If you have a BMI of greater than 30 prior to pregnancy,  you are considered overweight:  The recommended weight gain is between 15-25 pounds. Approximate weight gain  in the first trimester is 1-4.5 pounds. An expected weight gain during the second  and third trimester is approximately 0.5 pound per week.    Foods to avoid during pregnancy:   Unpasteurized milk, juice and cheese  - Soft cheeses like feta or brie (if made with UNPASTEURIZED milk)   Unheated deli meats like lunchmeat and hotdogs   Undercooked poultry, beef, pork, seafood including raw sushi    What fish is safe to eat during pregnancy?  Eat 8 to 12 ounces of fish a week. Pick from this group frequently, especially if you follow  the American Heart Association’s recommendation to eat fish at least 2 times a week.    AVOID HIGH MERCURY FISH  A single meal of the following fish can put  you over the Environmental Protection  Agency’s safe limit for the month.  High mercury fish:  Shark  Swordfish  Zen Mackerel  Tile Fish    Caffeine and Pregnancy  The March of Dimes and American College of Obstetrics and Gynecologists (ACOG) urge pregnant  women to limit their caffeine consumption to no more than 200 milligrams (mg) per day. This is  comparable to having one 12-ounce cup of coffee a day. This level has been shown not to increase risk  of miscarriage, growth or  labor complications. Effects of higher levels are not known.    Exercise During Pregnancy  A daily exercise program that consists of 30 minutes a day is recommended.   Low impact exercises like walking and swimming are great exercises throughout  all of pregnancy   If you’re an avid strength  avoid lifting very heavy weights - nothing more  than 30 pounds    Drink plenty of  fluids while exercising to stay hydrated.  Be careful to avoid overheating.    ACTIVITIES TO AVOID   Exercises that can make you lose your balance.   Activities that can put your baby at risk i.e. horseback riding, scuba diving, skiing  or snowboarding. Any other sport that puts you at risk for getting hit in the  abdominal area.   Do not use saunas, steam rooms or hot tubs (that have a higher temperature  than 100F)   After the first trimester, avoid exercises that require you to lay flat on your back.   Avoid exceeding a heart rate greater than 140 beats per minute. As long as you are  able to hold a conversation while exercising your heart rate is likely acceptable    Vaccines and Pregnancy    Information for pregnant women  Vaccines help protect you and your baby against serious diseases.  Whooping Cough Vaccine  Whooping cough (or pertussis) can be serious for anyone, but for your , it can be lifethreatening.  Up to 20 babies die each year in the [place] due to whooping cough.   When you get the whooping cough vaccine during your pregnancy, your body will create protective  antibodies and pass some of them to your baby before birth. These antibodies will provide your  baby some short-term, early protection against whooping cough.  Learn more at www.cdc.gov/pertussis/pregnant/.    Flu Vaccine  Changes in your immune, heart, and lung functions during pregnancy make you more likely to get  seriously ill from the flu. Catching the flu also increases your chances for serious problems for your  developing baby, including premature labor and delivery. Get the flu shot if you are pregnant during  flu season--it’s the best way to protect yourself and your baby for several months after birth from flu-related  complications.  Flu seasons vary in their timing from season to season, but CDC recommends getting vaccinated  by the end of October, if possible. This timing helps protect you before flu activity  begins to increase.  Find more on how to prevent the flu by visiting www.cdc.gov/flu/.    Covid Vaccine  Similar to the flu, Changes in your immune, heart, and lung functions during pregnancy make you more likely to get  seriously ill from COVID. It  also increases your chances for serious problems for your  developing baby, including premature labor and delivery.  Please consider getting your covid vaccine if you haven't already. This is endorsed by both ACOG- American College of Obstetrics and Gynecology and SMFM- Society of Maternal Fetal Medicine    Fetal Activity  You will most likely start to feel your baby move (also known as quickening) between  18 and 20 weeks of pregnancy. First time moms might feel their baby’s movements  closer to 25 weeks while a second time mom might feel those first movements closer  to 16 weeks.

## 2024-02-01 ENCOUNTER — ROUTINE PRENATAL (OUTPATIENT)
Dept: OBGYN CLINIC | Facility: CLINIC | Age: 29
End: 2024-02-01

## 2024-02-01 VITALS
HEART RATE: 71 BPM | SYSTOLIC BLOOD PRESSURE: 124 MMHG | HEIGHT: 65 IN | BODY MASS INDEX: 30.82 KG/M2 | WEIGHT: 185 LBS | OXYGEN SATURATION: 99 % | DIASTOLIC BLOOD PRESSURE: 72 MMHG

## 2024-02-01 DIAGNOSIS — Z87.59 HISTORY OF GESTATIONAL HYPERTENSION: ICD-10-CM

## 2024-02-01 DIAGNOSIS — O09.892 SUPERVISION OF OTHER HIGH RISK PREGNANCIES, SECOND TRIMESTER: Primary | ICD-10-CM

## 2024-02-01 DIAGNOSIS — Z3A.18 18 WEEKS GESTATION OF PREGNANCY: ICD-10-CM

## 2024-02-01 PROCEDURE — PNV: Performed by: PHYSICIAN ASSISTANT

## 2024-02-01 NOTE — PROGRESS NOTES
18w3d pregnant female, here for prenatal visit.    Patient here for visit.  Wanting to hear fetal heartbeat.   Patient says she had felt some movement of baby last week but now not feeling baby.  She says she did not feel movement with her last pregnancy until 21 weeks pregnant.  No bleeding, no pain, no discharge     Anatomy scan scheduled 2/15/23      Current Outpatient Medications on File Prior to Visit   Medication Sig Dispense Refill    albuterol (PROVENTIL HFA,VENTOLIN HFA) 90 mcg/act inhaler Inhale 2 puffs every 6 (six) hours as needed      cetirizine (ZyrTEC) 10 mg tablet Take 10 mg by mouth daily      EPINEPHrine (EPIPEN) 0.3 mg/0.3 mL SOAJ Inject 0.3 mL (0.3 mg total) into a muscle once for 1 dose 0.6 mL 5    fluticasone (FLONASE) 50 mcg/act nasal spray 1 spray into each nostril daily      metoclopramide (Reglan) 10 mg tablet Take 1 tablet (10 mg total) by mouth 3 (three) times a day as needed (headache) 30 tablet 1    Prenatal Vit-Fe Fumarate-FA (PRENATAL 1 PLUS 1 PO) Take 1 tablet by mouth daily (Patient not taking: Reported on 2024)      Probiotic Product (Probiotic Daily) CAPS Take 1 capsule by mouth 3 (three) times a day (Patient not taking: Reported on 2024)       No current facility-administered medications on file prior to visit.       Pregravid Weight/BMI: 80.3 kg (177 lb) (BMI 29.45)  Current Weight: 83.9 kg (185 lb)   Total Weight Gain: 3.629 kg (8 lb)     Pre- Vitals      Flowsheet Row Most Recent Value   Prenatal Assessment    Prenatal Vitals    Blood Pressure 124/72   Weight - Scale 83.9 kg (185 lb)   Urine Albumin/Glucose    Dilation/Effacement/Station    Vaginal Drainage    Edema                Vitals:    24 0935   BP: 124/72   Pulse: 71   SpO2: 99%       Fundal height: 18  Fetal Heart Rate: 146      Problem List          Respiratory    Asthma       Musculoskeletal and Integument    Congenital bilateral hip instability    Overview     Last Assessment & Plan:    Formatting of this note might be different from the original.  Pt scheduled for elective primary  due to congenital hip issues since birth. Is scheduled for 2020 at noon with Dr. Bernardo.  Labor and delivery and OR aware            Other    H/O  section    Overview     Requests repeat          18 weeks gestation of pregnancy    History of gestational hypertension    Overview     Dx at 31 wks         Supervision of other high risk pregnancies, second trimester         Ob visit in 3 weeks  US scheduled 2/15/24- patient declines trying to move up this date.  She will call sooner if any concerns.

## 2024-02-15 ENCOUNTER — ROUTINE PRENATAL (OUTPATIENT)
Dept: PERINATAL CARE | Facility: OTHER | Age: 29
End: 2024-02-15
Payer: COMMERCIAL

## 2024-02-15 VITALS
WEIGHT: 191.4 LBS | HEART RATE: 105 BPM | SYSTOLIC BLOOD PRESSURE: 128 MMHG | HEIGHT: 65 IN | BODY MASS INDEX: 31.89 KG/M2 | DIASTOLIC BLOOD PRESSURE: 64 MMHG

## 2024-02-15 DIAGNOSIS — O09.892 PRIOR PREGNANCY COMPLICATED BY PIH, ANTEPARTUM, SECOND TRIMESTER: ICD-10-CM

## 2024-02-15 DIAGNOSIS — Z83.2 FAMILY HISTORY OF VON WILLEBRAND DISEASE: ICD-10-CM

## 2024-02-15 DIAGNOSIS — Z36.86 ENCOUNTER FOR ANTENATAL SCREENING FOR CERVICAL LENGTH: ICD-10-CM

## 2024-02-15 DIAGNOSIS — O99.512 ASTHMA AFFECTING PREGNANCY IN SECOND TRIMESTER: ICD-10-CM

## 2024-02-15 DIAGNOSIS — Z36.3 ENCOUNTER FOR ANTENATAL SCREENING FOR MALFORMATIONS: ICD-10-CM

## 2024-02-15 DIAGNOSIS — O34.219 HISTORY OF CESAREAN DELIVERY, ANTEPARTUM: Primary | ICD-10-CM

## 2024-02-15 DIAGNOSIS — J45.909 ASTHMA AFFECTING PREGNANCY IN SECOND TRIMESTER: ICD-10-CM

## 2024-02-15 DIAGNOSIS — Q74.2 CONGENITAL BILATERAL HIP INSTABILITY: ICD-10-CM

## 2024-02-15 DIAGNOSIS — Z3A.20 20 WEEKS GESTATION OF PREGNANCY: ICD-10-CM

## 2024-02-15 PROBLEM — Z82.49 FAMILY HISTORY OF THROMBOSIS IN FIRST DEGREE RELATIVE: Status: ACTIVE | Noted: 2024-02-15

## 2024-02-15 PROCEDURE — 76811 OB US DETAILED SNGL FETUS: CPT | Performed by: OBSTETRICS & GYNECOLOGY

## 2024-02-15 PROCEDURE — 76817 TRANSVAGINAL US OBSTETRIC: CPT | Performed by: OBSTETRICS & GYNECOLOGY

## 2024-02-15 PROCEDURE — 99244 OFF/OP CNSLTJ NEW/EST MOD 40: CPT | Performed by: OBSTETRICS & GYNECOLOGY

## 2024-02-15 RX ORDER — ASPIRIN 81 MG/1
162 TABLET ORAL DAILY
Qty: 180 TABLET | Refills: 3 | Status: SHIPPED | OUTPATIENT
Start: 2024-02-15

## 2024-02-15 NOTE — LETTER
February 15, 2024     Nini Cantu MD  [address]  [address]  [facility] [place] [address]    Patient: Delphine Carney   YOB: 1995   Date of Visit: 2/15/2024       Dear Dr. Cantu:    Thank you for referring Delphine Carney to me for evaluation. Below are my notes for this consultation.    If you have questions, please do not hesitate to call me. I look forward to following your patient along with you.         Sincerely,        Dennise Dejesus MD        CC: No Recipients    Dennise Dejesus MD  2/15/2024  6:32 PM  Sign when Signing Visit  OFFICE CONSULT  Nini Cantu Md  [address] [facility]  [address]  [place] [id number]     Dear Dr. Cantu     Thank you for requesting a  consultation on your patient Delphine Carney for the following indications: Fetal anatomy.  NIPT and screening for cystic fibrosis and SMA showed no evidence for aneuploidy and no evidence for carrier states. She is a transfer of care from [facility].    History  Past medical history: Gestational hypertension with her prior pregnancy, history of mild asthma that requires an inhaler once a month  Past surgical history: Prior low-transverse  section  Medications: Prenatal vitamins  Allergies to medications: None but she does report having a shellfish allergy  Past obstetrical history: 2020 at 38 weeks she had a 7 pound 5 ounce baby boy by low-transverse .  She states her  was scheduled electively because of gestational hypertension and congenital hip dysplasia and she was encouraged to have a primary .  She is planning on a repeat   Social history: Reports no substance use  First generation family history: She reports her father has had multiple DVTs  with surgeries and before the age of 50 and she is not aware if he is ever had a thrombophilia workup.  Her sister has mild von Willebrand's disease but has never required any DDAVP prophylaxis prior to  her multiple surgeries.  Delphine reports she had heavy bleeding at delivery although her op not only reports 800 ml and that her periods are heavy.     Ultrasound findings: The growth is concordant with her dates.  Normal amniotic fluid and cervical length are seen.  No malformations are detected.    The patient was informed of the findings and counseled about the limitations of the exam in detecting all forms of fetal congenital abnormalities.    She does not report any vaginal bleeding or uterine cramping/contractions. She does feel fetal movement.      Specific counseling was provided on the following problems:  1.   Recommend baseline preeclamptic labs.  She has already completed a CMP and CBC.  A PC ratio was ordered for her to complete today.  2.   Recommend she start baby aspirin 162 mg daily till 36 weeks and 0 days.  3.   Recommend she obtain a consent form from my  or through her ob office for her father to fill out and sign so that we may review his records to see if he had a hypercoagulable state workup completed. ( She states he is a patient at [facility]).  Otherwise it is reasonable to order a inherited thrombophilia panel on Delphine that includes Antithrombin III activity, protein C activity, protein S activity, prothrombin gene and factor V Leiden.  4.   Congenital hip dysplasia is not commonly used is an indication for voiding a vaginal delivery and recommending a primary .  I asked if she was interested in delivering vaginally with this pregnancy which she reports she was not.   5.   Most cases of von Willebrand's are inherited in an autosomal dominant fashion.  If her sister has this diagnosis then Delphine may have a 50% chance of having it as well unless her sister developed it as a new mutation.  Recommend screening for von Willebrand's.  Factor VIII levels in pregnancy are usually higher than an nonpregnant states which can protect the patient against significant bleeding.   The best time to screen someone for von Willebrand's is outside of pregnancy.  For now recommend screening for von Willebrand's activity and the von willbrand antigen to detect if she would require treatment for delivery.  She is aware to delay starting her baby aspirin until after she completes this test.     Follow up recommended:   Recommend a follow-up ultrasound for growth at 32 weeks.    Pre visit time reviewing her records  10 minutes  Face to face time 15 minutes  Post visit time on documentation of note, updating her problem list, adding orders and prescriptions 20 minutes.  Procedures that were completed today were charged separately.   The level of decision making was moderate complexity.    Dennise Dejesus MD

## 2024-02-15 NOTE — PROGRESS NOTES
Ultrasound Probe Disinfection    A transvaginal ultrasound was performed.   Prior to use, disinfection was performed with High Level Disinfection Process ([employer]).  Probe serial number [id number] was used.      Tram RUSH, SHADE  02/15/24  2:14 PM

## 2024-02-15 NOTE — PROGRESS NOTES
OFFICE CONSULT  Nini Cantu Md  [address] [facility] [address]  [address]  [place] [address]     Dear Dr. Cantu     Thank you for requesting a  consultation on your patient Delphine Carney for the following indications: Fetal anatomy.  NIPT and screening for cystic fibrosis and SMA showed no evidence for aneuploidy and no evidence for carrier states. She is a transfer of care from [facility].    History  Past medical history: Gestational hypertension with her prior pregnancy, history of mild asthma that requires an inhaler once a month  Past surgical history: Prior low-transverse  section  Medications: Prenatal vitamins  Allergies to medications: None but she does report having a shellfish allergy  Past obstetrical history: 2020 at 38 weeks she had a 7 pound 5 ounce baby boy by low-transverse .  She states her  was scheduled electively because of gestational hypertension and congenital hip dysplasia and she was encouraged to have a primary .  She is planning on a repeat   Social history: Reports no substance use  First generation family history: She reports her father has had multiple DVTs  with surgeries and before the age of 50 and she is not aware if he is ever had a thrombophilia workup.  Her sister has mild von Willebrand's disease but has never required any DDAVP prophylaxis prior to her multiple surgeries.  Delphine reports she had heavy bleeding at delivery although her op not only reports 800 ml and that her periods are heavy.     Ultrasound findings: The growth is concordant with her dates.  Normal amniotic fluid and cervical length are seen.  No malformations are detected.    The patient was informed of the findings and counseled about the limitations of the exam in detecting all forms of fetal congenital abnormalities.    She does not report any vaginal bleeding or uterine cramping/contractions. She does feel fetal movement.      Specific  counseling was provided on the following problems:  1.   Recommend baseline preeclamptic labs.  She has already completed a CMP and CBC.  A PC ratio was ordered for her to complete today.  2.   Recommend she start baby aspirin 162 mg daily till 36 weeks and 0 days.  3.   Recommend she obtain a consent form from my  or through her ob office for her father to fill out and sign so that we may review his records to see if he had a hypercoagulable state workup completed. ( She states he is a patient at [facility]).  Otherwise it is reasonable to order a inherited thrombophilia panel on Delphine that includes Antithrombin III activity, protein C activity, protein S activity, prothrombin gene and factor V Leiden.  4.   Congenital hip dysplasia is not commonly used is an indication for voiding a vaginal delivery and recommending a primary .  I asked if she was interested in delivering vaginally with this pregnancy which she reports she was not.   5.   Most cases of von Willebrand's are inherited in an autosomal dominant fashion.  If her sister has this diagnosis then Delphine may have a 50% chance of having it as well unless her sister developed it as a new mutation.  Recommend screening for von Willebrand's.  Factor VIII levels in pregnancy are usually higher than an nonpregnant states which can protect the patient against significant bleeding.  The best time to screen someone for von Willebrand's is outside of pregnancy.  For now recommend screening for von Willebrand's activity and the von willbrand antigen to detect if she would require treatment for delivery.  She is aware to delay starting her baby aspirin until after she completes this test.     Follow up recommended:   Recommend a follow-up ultrasound for growth at 32 weeks.    Pre visit time reviewing her records  10 minutes  Face to face time 15 minutes  Post visit time on documentation of note, updating her problem list, adding orders and  prescriptions 20 minutes.  Procedures that were completed today were charged separately.   The level of decision making was moderate complexity.    Dennise Dejesus MD

## 2024-02-18 PROBLEM — Z3A.21 21 WEEKS GESTATION OF PREGNANCY: Status: ACTIVE | Noted: 2024-01-29

## 2024-02-18 NOTE — PROGRESS NOTES
"OB/GYN  PN Visit  Delphine Carney  [id number]  2024  1:12 PM  Dr. Nini Cantu MD    S: 29 y.o.  21 2/7d here for PN visit.       Chief Complaint   Patient presents with    Routine Prenatal Visit         OB complaints:  Contractions: no  Leakage: no  Bleeding: yes  Fetal movement: yes      O:  /70   Pulse 75   Ht 5' 5\" (1.651 m)   Wt 88.5 kg (195 lb)   SpO2 99%   BMI 32.45 kg/m²       Review of Systems   Constitutional: Negative.    HENT: Negative.     Eyes: Negative.    Respiratory: Negative.     Cardiovascular: Negative.    Gastrointestinal: Negative.    Endocrine: Negative.    Genitourinary:         As noted in HPI   Musculoskeletal: Negative.    Skin: Negative.    Allergic/Immunologic: Negative.    Neurological: Negative.    Hematological: Negative.    Psychiatric/Behavioral: Negative.           Physical Exam  Constitutional:       General: She is not in acute distress.     Appearance: Normal appearance. She is well-developed.   Abdominal:      Palpations: Abdomen is soft.      Tenderness: There is no abdominal tenderness. There is no guarding.   Neurological:      Mental Status: She is alert and oriented to person, place, and time.   Skin:     General: Skin is warm and dry.   Psychiatric:         Behavior: Behavior normal.     US confirmed cardiac activity        Pregravid Weight/BMI: 80.3 kg (177 lb) (BMI 29.45)  Current Weight: 88.5 kg (195 lb)   Total Weight Gain: 8.165 kg (18 lb)   Pre-Delonte Vitals      Flowsheet Row Most Recent Value   Prenatal Assessment    Fetal Heart Rate 153   Movement Present   Prenatal Vitals    Blood Pressure 122/70   Weight - Scale 88.5 kg (195 lb)   Urine Albumin/Glucose    Dilation/Effacement/Station    Vaginal Drainage    Edema              Problem List          Respiratory    Asthma    Asthma affecting pregnancy in second trimester       Musculoskeletal and Integument    Congenital bilateral hip instability    Overview     Patient had an elective primary "  due to congenital hip issues since birth with first pregnancy and is planning on a repeat .  This is not a common indication for avoiding a vaginal delivery and she was given the opportunity to consider a  which she declined            Other    21 weeks gestation of pregnancy    History of gestational hypertension    Overview     Dx at 31 wks         Supervision of other high risk pregnancies, second trimester    History of  delivery, antepartum    Family history of von Willebrand disease    Overview     Her sister has a mild case and does not require treatment for surgery. Von Willebrand's activity and antigen ordered patient reports she does have heavy periods.          Family history of thrombosis in first degree relative    Overview     She reports that her father has had multiple DVT prior to age 50 and many in association with surgery.  She will have her father signed a consent to see if he is a carrier for thrombophilia.  If he has not been screened then recommend screening her for inherited thrombophilia including Antithrombin III, protein C and protein S activity levels and prothrombin gene and factor V Leiden.           Follow Up in 4 weeks for routine OB visit.  32 wk growth scan is planned  Patient planning on repeat .  We will plan accordingly.  CBC, RPR and Glucola was ordered.  She is starting to have hip discomfort.  Declined pelvic floor PT at this time.  Advised heat, massage, Tylenol, support bands.      Future Appointments   Date Time Provider Department Center   3/22/2024  1:00 PM Kaila Porter MD Complete  [facility]   2024  2:00 PM  US 1 [facility]               Nini Cantu MD  2024  1:12 PM

## 2024-02-18 NOTE — PATIENT INSTRUCTIONS
Pregnancy at 19 to 22 Weeks   WHAT YOU NEED TO KNOW:   Now that you are in your second trimester, you have more energy. You may also be feeling hungrier than usual. You may be gaining about ½ to 1 pound a week, and your pregnancy is beginning to show. You may need to start wearing maternity clothes. As your baby gets larger, you may have other symptoms. These may include body aches or stretch marks on your abdomen, breasts, thighs, or buttocks.  DISCHARGE INSTRUCTIONS:   Return to the emergency department if:   You develop a severe headache that does not go away.    You have new or increased vision changes, such as blurred or spotted vision.    You have new or increased swelling in your face or hands.    You have vaginal spotting or bleeding.    Your water broke or you feel warm water gushing or trickling from your vagina.    Call your doctor or obstetrician if:   You have abdominal cramps, pressure, or tightening.    You have a change in vaginal discharge.    You cannot keep food or drinks down, and you are losing weight.    You have chills or a fever.    You have vaginal itching, burning, or pain.    You have yellow, green, white, or foul-smelling vaginal discharge.    You have pain or burning when you urinate, less urine than usual, or pink or bloody urine.    You have questions or concerns about your condition or care.    How to care for yourself at this stage of your pregnancy:       Eat a variety of healthy foods.  Healthy foods include fruits, vegetables, whole-grain breads, low-fat dairy foods, beans, lean meats, and fish. Drink liquids as directed. Ask how much liquid to drink each day and which liquids are best for you. Limit caffeine to less than 200 milligrams each day. Limit your intake of fish to 2 servings each week. Choose fish low in mercury such as canned light tuna, shrimp, salmon, cod, or tilapia. Do not  eat fish high in mercury such as swordfish, tilefish, alice mackerel, and shark.         Take  prenatal vitamins as directed.  Your need for certain vitamins and minerals, such as folic acid, increases during pregnancy. Prenatal vitamins provide some of the extra vitamins and minerals you need. Prenatal vitamins may also help to decrease the risk of certain birth defects.         Talk to your healthcare provider about exercise.  Moderate exercise can help you stay fit. Your healthcare provider will help you plan an exercise program that is safe for you during pregnancy.         Do not smoke.  Smoking increases your risk of a miscarriage and other health problems during your pregnancy. Smoking can cause your baby to be born too early or weigh less at birth. Ask your healthcare provider for information if you need help quitting.    Do not drink alcohol.  Alcohol passes from your body to your baby through the placenta. It can affect your baby's brain development and cause fetal alcohol syndrome (FAS). FAS is a group of conditions that causes mental, behavior, and growth problems.    Talk to your healthcare provider before you take any medicines.  Many medicines may harm your baby if you take them when you are pregnant. Do not take any medicines, vitamins, herbs, or supplements without first talking to your healthcare provider. Never use illegal or street drugs (such as marijuana or cocaine) while you are pregnant.  Safety tips during pregnancy:   Avoid hot tubs and saunas.  Do not use a hot tub or sauna while you are pregnant, especially during your first trimester. Hot tubs and saunas may raise your baby's temperature and increase the risk of birth defects.    Avoid toxoplasmosis.  This is an infection caused by eating raw meat or being around infected cat feces. It can cause birth defects, miscarriages, and other problems. Wash your hands after you touch raw meat. Make sure any meat is well-cooked before you eat it. Avoid raw eggs and unpasteurized milk. Use gloves or ask someone else to clean your cat's  litter box while you are pregnant.       Changes happening with your baby:  By 22 weeks, your baby is about 8 inches long from the top of the head to the rump (baby's bottom). Your baby also weighs about 1 pound. Your baby is becoming much more active. You may be able to feel the baby move inside you now. The first movements may not be that noticeable. They may feel like a fluttering sensation. As time goes on, your baby's movements will become stronger and more noticeable.  What you need to know about prenatal care:  During the first 28 weeks of your pregnancy, you will see your healthcare provider once a month. Your healthcare provider will check your blood pressure and weight. You may also need the following:  A urine test  may also be done to check for sugar and protein. These can be signs of gestational diabetes or infection. Protein in your urine may also be a sign of preeclampsia. Preeclampsia is a condition that can develop during week 20 or later of your pregnancy. It causes high blood pressure, and it can cause problems with your kidneys and other organs.    Fundal height  is a measurement of your uterus to check your baby's growth. This number is usually the same as the number of weeks that you have been pregnant.    A fetal ultrasound  shows pictures of your baby inside your uterus. It shows your baby's development. The movement and position of your baby can also be seen. Your healthcare provider may be able to tell you what your baby's gender is during the ultrasound.         Your baby's heart rate  will be checked.    Follow up with your obstetrician as directed:  Write down your questions so you remember to ask them during your visits.  © Copyright [employer] 2023 Information is for End User's use only and may not be sold, redistributed or otherwise used for commercial purposes.  The above information is an  only. It is not intended as medical advice for individual conditions or  treatments. Talk to your doctor, nurse or pharmacist before following any medical regimen to see if it is safe and effective for you.

## 2024-02-21 ENCOUNTER — ROUTINE PRENATAL (OUTPATIENT)
Dept: OBGYN CLINIC | Facility: CLINIC | Age: 29
End: 2024-02-21

## 2024-02-21 VITALS
HEART RATE: 75 BPM | WEIGHT: 195 LBS | DIASTOLIC BLOOD PRESSURE: 70 MMHG | HEIGHT: 65 IN | BODY MASS INDEX: 32.49 KG/M2 | OXYGEN SATURATION: 99 % | SYSTOLIC BLOOD PRESSURE: 122 MMHG

## 2024-02-21 DIAGNOSIS — Z87.59 HISTORY OF GESTATIONAL HYPERTENSION: ICD-10-CM

## 2024-02-21 DIAGNOSIS — O34.219 HISTORY OF CESAREAN DELIVERY, ANTEPARTUM: ICD-10-CM

## 2024-02-21 DIAGNOSIS — J45.909 ASTHMA AFFECTING PREGNANCY IN SECOND TRIMESTER: ICD-10-CM

## 2024-02-21 DIAGNOSIS — O99.512 ASTHMA AFFECTING PREGNANCY IN SECOND TRIMESTER: ICD-10-CM

## 2024-02-21 DIAGNOSIS — O09.892 SUPERVISION OF OTHER HIGH RISK PREGNANCIES, SECOND TRIMESTER: Primary | ICD-10-CM

## 2024-02-21 DIAGNOSIS — Z3A.21 21 WEEKS GESTATION OF PREGNANCY: ICD-10-CM

## 2024-02-21 PROCEDURE — PNV: Performed by: OBSTETRICS & GYNECOLOGY

## 2024-03-14 ENCOUNTER — LAB (OUTPATIENT)
Dept: LAB | Facility: MEDICAL CENTER | Age: 29
End: 2024-03-14
Payer: COMMERCIAL

## 2024-03-14 DIAGNOSIS — E66.3 OVERWEIGHT (BMI 25.0-29.9): ICD-10-CM

## 2024-03-14 DIAGNOSIS — Z3A.21 21 WEEKS GESTATION OF PREGNANCY: ICD-10-CM

## 2024-03-14 DIAGNOSIS — Z3A.18 18 WEEKS GESTATION OF PREGNANCY: ICD-10-CM

## 2024-03-14 DIAGNOSIS — Z83.2 FAMILY HISTORY OF VON WILLEBRAND DISEASE: ICD-10-CM

## 2024-03-14 DIAGNOSIS — O09.892 PRIOR PREGNANCY COMPLICATED BY PIH, ANTEPARTUM, SECOND TRIMESTER: ICD-10-CM

## 2024-03-14 DIAGNOSIS — Z87.42 HISTORY OF ABNORMAL UTERINE BLEEDING: ICD-10-CM

## 2024-03-14 LAB
BASOPHILS # BLD AUTO: 0.02 THOUSANDS/ÂΜL (ref 0–0.1)
BASOPHILS NFR BLD AUTO: 0 % (ref 0–1)
CREAT UR-MCNC: 22.9 MG/DL
EOSINOPHIL # BLD AUTO: 0.13 THOUSAND/ÂΜL (ref 0–0.61)
EOSINOPHIL NFR BLD AUTO: 2 % (ref 0–6)
ERYTHROCYTE [DISTWIDTH] IN BLOOD BY AUTOMATED COUNT: 13.2 % (ref 11.6–15.1)
GLUCOSE 1H P 50 G GLC PO SERPL-MCNC: 106 MG/DL (ref 70–134)
HCT VFR BLD AUTO: 34.9 % (ref 34.8–46.1)
HGB BLD-MCNC: 11.5 G/DL (ref 11.5–15.4)
IMM GRANULOCYTES # BLD AUTO: 0.04 THOUSAND/UL (ref 0–0.2)
IMM GRANULOCYTES NFR BLD AUTO: 1 % (ref 0–2)
LYMPHOCYTES # BLD AUTO: 1.25 THOUSANDS/ÂΜL (ref 0.6–4.47)
LYMPHOCYTES NFR BLD AUTO: 16 % (ref 14–44)
MCH RBC QN AUTO: 32.2 PG (ref 26.8–34.3)
MCHC RBC AUTO-ENTMCNC: 33 G/DL (ref 31.4–37.4)
MCV RBC AUTO: 98 FL (ref 82–98)
MONOCYTES # BLD AUTO: 0.29 THOUSAND/ÂΜL (ref 0.17–1.22)
MONOCYTES NFR BLD AUTO: 4 % (ref 4–12)
NEUTROPHILS # BLD AUTO: 6.17 THOUSANDS/ÂΜL (ref 1.85–7.62)
NEUTS SEG NFR BLD AUTO: 77 % (ref 43–75)
NRBC BLD AUTO-RTO: 0 /100 WBCS
PLATELET # BLD AUTO: 211 THOUSANDS/UL (ref 149–390)
PMV BLD AUTO: 10.1 FL (ref 8.9–12.7)
PROT UR-MCNC: <4 MG/DL
RBC # BLD AUTO: 3.57 MILLION/UL (ref 3.81–5.12)
TSH SERPL DL<=0.05 MIU/L-ACNC: 1.47 UIU/ML (ref 0.45–4.5)
WBC # BLD AUTO: 7.9 THOUSAND/UL (ref 4.31–10.16)

## 2024-03-14 PROCEDURE — 84443 ASSAY THYROID STIM HORMONE: CPT

## 2024-03-14 PROCEDURE — 36415 COLL VENOUS BLD VENIPUNCTURE: CPT

## 2024-03-14 PROCEDURE — 82950 GLUCOSE TEST: CPT

## 2024-03-14 PROCEDURE — 85025 COMPLETE CBC W/AUTO DIFF WBC: CPT

## 2024-03-14 PROCEDURE — 85245 CLOT FACTOR VIII VW RISTOCTN: CPT

## 2024-03-14 PROCEDURE — 82570 ASSAY OF URINE CREATININE: CPT

## 2024-03-14 PROCEDURE — 82105 ALPHA-FETOPROTEIN SERUM: CPT

## 2024-03-14 PROCEDURE — 84156 ASSAY OF PROTEIN URINE: CPT

## 2024-03-14 PROCEDURE — 86780 TREPONEMA PALLIDUM: CPT

## 2024-03-14 PROCEDURE — 85246 CLOT FACTOR VIII VW ANTIGEN: CPT

## 2024-03-15 LAB
TREPONEMA PALLIDUM IGG+IGM AB [PRESENCE] IN SERUM OR PLASMA BY IMMUNOASSAY: NORMAL
VWF AG ACT/NOR PPP IA: 135 % (ref 50–200)
VWF:RCO ACT/NOR PPP PL AGG: 124 % (ref 50–200)

## 2024-03-15 NOTE — RESULT ENCOUNTER NOTE
Delphine Carney   Your labs results below returned as normal.     Von Willebrand factor and factor VIII increase significantly during pregnancy in normal women, already within the first trimester, reaching levels by far >100 U/dL by the time of delivery.  Hence it is difficult to diagnose someone with von Willebrand's in pregnancy as pregnancy hormones improve these levels.  If you have any further concerns about bleeding either with this pregnancy or after delivery then recommend your PCP do further blood work when pregnancy is low no longer a factor.  This would be at least 12 weeks after pregnancy.    Dennise Dejesus MD

## 2024-03-15 NOTE — RESULT ENCOUNTER NOTE
Delphine Carney   Your labs results below returned as normal.     Protein creatine ratio.     You had blood work completed for msafp which can only be completed between 16-21w6d so now at 24 weeks this result will not be accurate. I will have my staff call the lab to cancel the test.     Dennise Dejesus MD

## 2024-03-18 ENCOUNTER — TELEPHONE (OUTPATIENT)
Dept: PERINATAL CARE | Facility: OTHER | Age: 29
End: 2024-03-18

## 2024-03-18 NOTE — TELEPHONE ENCOUNTER
Received InBasket message from Dr. Dejesus - patient went to late for MSAP testing and test should be cancelled.    3/15/24 09:00 phone call to [facility] , spoke with Donita, MSAFP sample has not  been received  advised to call Monday 3/18.    [facility] confirmed lab was received late Friday night.  Discussed why MSFAP should be cancelled and wali confirmed cancellation with my name and job title.  Phone call to patient and updated. Delphine states she knew the MSAFP test should not have been collected and appreciative of order cancellation.

## 2024-03-19 LAB
2ND TRIMESTER 4 SCREEN SERPL-IMP: NORMAL
AFP ADJ MOM SERPL: NORMAL
AFP INTERP SERPL-IMP: NORMAL
AFP INTERP SERPL-IMP: NORMAL
AFP SERPL-MCNC: 81.3 NG/ML
AGE AT DELIVERY: 28.4 YR
GA METHOD: NORMAL
GA: 24.4 WEEKS
IDDM PATIENT QL: NO
MULTIPLE PREGNANCY: NO
NEURAL TUBE DEFECT RISK FETUS: NORMAL %

## 2024-03-22 ENCOUNTER — ROUTINE PRENATAL (OUTPATIENT)
Dept: OBGYN CLINIC | Facility: CLINIC | Age: 29
End: 2024-03-22

## 2024-03-22 VITALS
HEART RATE: 73 BPM | HEIGHT: 65 IN | BODY MASS INDEX: 34.05 KG/M2 | OXYGEN SATURATION: 100 % | SYSTOLIC BLOOD PRESSURE: 122 MMHG | DIASTOLIC BLOOD PRESSURE: 72 MMHG | WEIGHT: 204.4 LBS

## 2024-03-22 DIAGNOSIS — Z3A.25 25 WEEKS GESTATION OF PREGNANCY: ICD-10-CM

## 2024-03-22 DIAGNOSIS — O34.219 HISTORY OF CESAREAN DELIVERY, ANTEPARTUM: Primary | ICD-10-CM

## 2024-03-22 DIAGNOSIS — O09.892 SUPERVISION OF OTHER HIGH RISK PREGNANCIES, SECOND TRIMESTER: ICD-10-CM

## 2024-03-22 DIAGNOSIS — Z87.59 HISTORY OF GESTATIONAL HYPERTENSION: ICD-10-CM

## 2024-03-22 PROCEDURE — PNV: Performed by: OBSTETRICS & GYNECOLOGY

## 2024-03-22 NOTE — PROGRESS NOTES
"    S: 29 y.o.  25w4d here for routine prenatal visit.        Chief Complaint   Patient presents with    Routine Prenatal Visit     No concerns          OB complaints:  Contractions: no  Leakage: no  Bleeding: no  Fetal movement: yes      O:  /72   Pulse 73   Ht 5' 5\" (1.651 m)   Wt 92.7 kg (204 lb 6.4 oz)   SpO2 100%   BMI 34.01 kg/m²       Review of Systems   Constitutional:  Negative for chills and fever.   Eyes:  Negative for visual disturbance.   Respiratory:  Negative for chest tightness and shortness of breath.    Cardiovascular:  Negative for chest pain.   Gastrointestinal:  Negative for abdominal pain, diarrhea, nausea and vomiting.   Genitourinary:  Negative for pelvic pain and vaginal bleeding.        As noted in HPI   Skin:  Negative for rash.   Neurological:  Negative for headaches.   All other systems reviewed and are negative.        Physical Exam  Constitutional:       General: She is not in acute distress.     Appearance: Normal appearance.   HENT:      Head: Normocephalic and atraumatic.   Cardiovascular:      Rate and Rhythm: Normal rate.   Pulmonary:      Effort: Pulmonary effort is normal. No respiratory distress.   Abdominal:      General: There is no distension.      Palpations: Abdomen is soft.      Tenderness: There is no abdominal tenderness. There is no guarding or rebound.      Comments: gravid   Neurological:      General: No focal deficit present.      Mental Status: She is alert.   Psychiatric:         Mood and Affect: Mood normal.         Behavior: Behavior normal.   Vitals and nursing note reviewed.           Fundal Height (cm): 26 cm  Fetal Heart Rate: 150    Pregravid Weight/BMI: 80.3 kg (177 lb) (BMI 29.45)  Current Weight: 92.7 kg (204 lb 6.4 oz)   Total Weight Gain: 12.4 kg (27 lb 6.4 oz)     Pre- Vitals      Flowsheet Row Most Recent Value   Prenatal Assessment    Fetal Heart Rate 150   Fundal Height (cm) 26 cm   Movement Present   Prenatal Vitals  "   Blood Pressure 122/72   Weight - Scale 92.7 kg (204 lb 6.4 oz)   Urine Albumin/Glucose    Dilation/Effacement/Station    Vaginal Drainage    Edema                     Problem List       Asthma    Congenital bilateral hip instability    Overview     Patient had an elective primary  due to congenital hip issues since birth with first pregnancy and is planning on a repeat .  This is not a common indication for avoiding a vaginal delivery and she was given the opportunity to consider a  which she declined         25 weeks gestation of pregnancy    History of gestational hypertension    Overview     Dx at 31 wks. Weighed 70 lbs more at that point. Had medical weight loss          Supervision of other high risk pregnancies, second trimester    Overview     Flu vaccine: completed   COVID vaccine: completed  NIPT low risk. AFP collected too late          History of  delivery, antepartum    Overview     Plans repeat C/S          Asthma affecting pregnancy in second trimester    Family history of von Willebrand disease    Overview     Her sister has a mild case and does not require treatment for surgery. Von Willebrand's activity and antigen ordered patient reports she does have heavy periods and they returned as normal however these levels will increase significantly in pregnancy and may be falsely normal.  If she has significant bleeding either during pregnancy or after pregnancy these levels will need to be repeated at least 12 weeks outside of pregnancy.         Family history of thrombosis in first degree relative    Overview     She reports that her father has had multiple DVT prior to age 50 and many in association with surgery.  She will have her father signed a consent to see if he is a carrier for thrombophilia.  If he has not been screened then recommend screening her for inherited thrombophilia including Antithrombin III, protein C and protein S activity levels and prothrombin gene  and factor V Leiden.                              Kaila Porter MD  3/22/2024  1:08 PM

## 2024-03-29 ENCOUNTER — TELEPHONE (OUTPATIENT)
Dept: FAMILY MEDICINE CLINIC | Facility: CLINIC | Age: 29
End: 2024-03-29

## 2024-04-12 ENCOUNTER — ROUTINE PRENATAL (OUTPATIENT)
Dept: OBGYN CLINIC | Facility: CLINIC | Age: 29
End: 2024-04-12

## 2024-04-12 VITALS
BODY MASS INDEX: 34.01 KG/M2 | SYSTOLIC BLOOD PRESSURE: 116 MMHG | OXYGEN SATURATION: 100 % | HEIGHT: 65 IN | DIASTOLIC BLOOD PRESSURE: 62 MMHG | HEART RATE: 85 BPM

## 2024-04-12 DIAGNOSIS — O09.892 SUPERVISION OF OTHER HIGH RISK PREGNANCIES, SECOND TRIMESTER: ICD-10-CM

## 2024-04-12 DIAGNOSIS — Z3A.28 28 WEEKS GESTATION OF PREGNANCY: ICD-10-CM

## 2024-04-12 DIAGNOSIS — Z87.59 HISTORY OF GESTATIONAL HYPERTENSION: ICD-10-CM

## 2024-04-12 DIAGNOSIS — O09.93 SUPERVISION OF HIGH RISK PREGNANCY IN THIRD TRIMESTER: Primary | ICD-10-CM

## 2024-04-12 PROBLEM — O99.513 ASTHMA AFFECTING PREGNANCY IN THIRD TRIMESTER: Status: ACTIVE | Noted: 2024-02-15

## 2024-04-12 PROCEDURE — PNV: Performed by: OBSTETRICS & GYNECOLOGY

## 2024-04-12 NOTE — PROGRESS NOTES
"    S: 29 y.o.  28w4d here for routine prenatal visit.        Chief Complaint   Patient presents with    Routine Prenatal Visit     Been very shaky and hot, then will get chills- started on Monday. States her Bp has been running high, in the 130s.         OB complaints:  Contractions: no  Leakage: no  Bleeding: no  Fetal movement: yes        O:  /62 (BP Location: Right arm, Patient Position: Sitting, Cuff Size: Adult)   Pulse 85   Ht 5' 5\" (1.651 m)   SpO2 100%   BMI 34.01 kg/m²       Review of Systems   Constitutional:  Negative for chills and fever.   Eyes:  Negative for visual disturbance.   Respiratory:  Negative for chest tightness and shortness of breath.    Cardiovascular:  Negative for chest pain.   Gastrointestinal:  Negative for abdominal pain, diarrhea, nausea and vomiting.   Genitourinary:  Negative for pelvic pain and vaginal bleeding.        As noted in HPI   Skin:  Negative for rash.   Neurological:  Negative for headaches.   All other systems reviewed and are negative.        Physical Exam  Constitutional:       General: She is not in acute distress.     Appearance: Normal appearance.   HENT:      Head: Normocephalic and atraumatic.   Cardiovascular:      Rate and Rhythm: Normal rate.   Pulmonary:      Effort: Pulmonary effort is normal. No respiratory distress.   Abdominal:      General: There is no distension.      Palpations: Abdomen is soft.      Tenderness: There is no abdominal tenderness. There is no guarding or rebound.      Comments: gravid   Neurological:      General: No focal deficit present.      Mental Status: She is alert.   Psychiatric:         Mood and Affect: Mood normal.         Behavior: Behavior normal.   Vitals and nursing note reviewed.           Fundal Height (cm): 28 cm  Fetal Heart Rate: 155    Pregravid Weight/BMI: 80.3 kg (177 lb) (BMI 29.45)  Current Weight:     Total Weight Gain: 12.4 kg (27 lb 6.4 oz)     Pre-[place] Vitals      Flowsheet Row Most Recent " Value   Prenatal Assessment    Fetal Heart Rate 155   Fundal Height (cm) 28 cm   Movement Present   Prenatal Vitals    Blood Pressure 116/62   Weight - Scale --  [scale out of order]   Urine Albumin/Glucose    Dilation/Effacement/Station    Vaginal Drainage    Edema                     Problem List       Asthma    Congenital bilateral hip instability    Overview     Patient had an elective primary  due to congenital hip issues since birth with first pregnancy and is planning on a repeat .  This is not a common indication for avoiding a vaginal delivery and she was given the opportunity to consider a  which she declined         28 weeks gestation of pregnancy    History of gestational hypertension    Overview     Dx at 31 wks. Weighed 70 lbs more at that point. Had medical weight loss          Supervision of high risk pregnancy in third trimester    Overview     Flu vaccine: completed   COVID vaccine: completed  NIPT low risk. AFP collected too late          Current Assessment & Plan     She is amenable to tdap next visit  She reports episodes of shakiness and possible hot flashes/chills. Not related to food intake. She reports drinking adequate water every day, >100 oz. Denies associated high BP or tachycardia when she checks her BP/pulse at home. No chest pain, SOB, N/V. Had recent TSH which was normal. Will repeat CBC, CMP  Desires repeat C/S - scheduled for  at 39+0 WGA. Declines sterilization or contraception   Consents given to review  OB precautions reviewed         History of  delivery, antepartum    Overview     Plans repeat C/S          Asthma affecting pregnancy in third trimester    Family history of von Willebrand disease    Overview     Her sister has a mild case and does not require treatment for surgery. Von Willebrand's activity and antigen ordered patient reports she does have heavy periods and they returned as normal however these levels will increase significantly  in pregnancy and may be falsely normal.  If she has significant bleeding either during pregnancy or after pregnancy these levels will need to be repeated at least 12 weeks outside of pregnancy.         Family history of thrombosis in first degree relative    Overview     She reports that her father has had multiple DVT prior to age 50 and many in association with surgery.  She will have her father signed a consent to see if he is a carrier for thrombophilia.  If he has not been screened then recommend screening her for inherited thrombophilia including Antithrombin III, protein C and protein S activity levels and prothrombin gene and factor V Leiden.         Encounter for care and examination of lactating mother                         Kaila Porter MD  4/12/2024  1:54 PM

## 2024-04-12 NOTE — ASSESSMENT & PLAN NOTE
She is amenable to tdap next visit  She reports episodes of shakiness and possible hot flashes/chills. Not related to food intake. She reports drinking adequate water every day, >100 oz. Denies associated high BP or tachycardia when she checks her BP/pulse at home. No chest pain, SOB, N/V. Had recent TSH which was normal. Will repeat CBC, CMP  Desires repeat C/S - scheduled for 6/24 at 39+0 WGA. Declines sterilization or contraception   Consents given to review  OB precautions reviewed

## 2024-04-20 ENCOUNTER — CLINICAL SUPPORT (OUTPATIENT)
Age: 29
End: 2024-04-20

## 2024-04-20 DIAGNOSIS — Z32.2 ENCOUNTER FOR CHILDBIRTH INSTRUCTION: Primary | ICD-10-CM

## 2024-04-25 ENCOUNTER — ROUTINE PRENATAL (OUTPATIENT)
Dept: OBGYN CLINIC | Facility: CLINIC | Age: 29
End: 2024-04-25
Payer: COMMERCIAL

## 2024-04-25 VITALS
SYSTOLIC BLOOD PRESSURE: 118 MMHG | WEIGHT: 218 LBS | DIASTOLIC BLOOD PRESSURE: 64 MMHG | OXYGEN SATURATION: 99 % | BODY MASS INDEX: 36.32 KG/M2 | HEIGHT: 65 IN | HEART RATE: 84 BPM

## 2024-04-25 DIAGNOSIS — Z23 ENCOUNTER FOR IMMUNIZATION: ICD-10-CM

## 2024-04-25 DIAGNOSIS — O34.219 HISTORY OF CESAREAN DELIVERY, ANTEPARTUM: Primary | ICD-10-CM

## 2024-04-25 DIAGNOSIS — O09.93 SUPERVISION OF HIGH RISK PREGNANCY IN THIRD TRIMESTER: ICD-10-CM

## 2024-04-25 DIAGNOSIS — Z3A.30 30 WEEKS GESTATION OF PREGNANCY: ICD-10-CM

## 2024-04-25 DIAGNOSIS — Z87.59 HISTORY OF GESTATIONAL HYPERTENSION: ICD-10-CM

## 2024-04-25 PROCEDURE — PNV: Performed by: OBSTETRICS & GYNECOLOGY

## 2024-04-25 PROCEDURE — 90715 TDAP VACCINE 7 YRS/> IM: CPT | Performed by: OBSTETRICS & GYNECOLOGY

## 2024-04-25 PROCEDURE — 90471 IMMUNIZATION ADMIN: CPT | Performed by: OBSTETRICS & GYNECOLOGY

## 2024-04-25 NOTE — ASSESSMENT & PLAN NOTE
Delivery consent reviewed and signed  Accepts tdap today  Growth US scheduled  OB precautions reviewed

## 2024-04-25 NOTE — ASSESSMENT & PLAN NOTE
She reports taking BP at home and it was as high as 138/86. Denies any HA/visual changes/CP/SOB/N/V/RUQ pain. Discussed concerning sx and potential reasons to check BP, risk of recurrence of gHTN

## 2024-04-25 NOTE — PROGRESS NOTES
"    S: 29 y.o.  30w3d here for routine prenatal visit.        Chief Complaint   Patient presents with    Routine Prenatal Visit     No concerns.         OB complaints:  Contractions: no  Leakage: no  Bleeding: no  Fetal movement: yes      O:  /64 (BP Location: Right arm, Patient Position: Sitting, Cuff Size: Adult)   Pulse 84   Ht 5' 5\" (1.651 m)   Wt 98.9 kg (218 lb)   SpO2 99%   BMI 36.28 kg/m²       Review of Systems   Constitutional:  Negative for chills and fever.   Eyes:  Negative for visual disturbance.   Respiratory:  Negative for chest tightness and shortness of breath.    Cardiovascular:  Negative for chest pain.   Gastrointestinal:  Negative for abdominal pain, diarrhea, nausea and vomiting.   Genitourinary:  Negative for pelvic pain and vaginal bleeding.        As noted in HPI   Skin:  Negative for rash.   Neurological:  Negative for headaches.   All other systems reviewed and are negative.        Physical Exam  Constitutional:       General: She is not in acute distress.     Appearance: Normal appearance.   HENT:      Head: Normocephalic and atraumatic.   Cardiovascular:      Rate and Rhythm: Normal rate.   Pulmonary:      Effort: Pulmonary effort is normal. No respiratory distress.   Abdominal:      General: There is no distension.      Palpations: Abdomen is soft.      Tenderness: There is no abdominal tenderness. There is no guarding or rebound.      Comments: gravid   Neurological:      General: No focal deficit present.      Mental Status: She is alert.   Psychiatric:         Mood and Affect: Mood normal.         Behavior: Behavior normal.   Vitals and nursing note reviewed.           Fundal Height (cm): 31 cm  Fetal Heart Rate: 150    Pregravid Weight/BMI: 80.3 kg (177 lb) (BMI 29.45)  Current Weight: 98.9 kg (218 lb)   Total Weight Gain: 18.6 kg (41 lb)     Pre- Vitals      Flowsheet Row Most Recent Value   Prenatal Assessment    Fetal Heart Rate 150   Fundal Height (cm) 31 " cm   Movement Present   Prenatal Vitals    Blood Pressure 118/64   Weight - Scale 98.9 kg (218 lb)   Urine Albumin/Glucose    Dilation/Effacement/Station    Vaginal Drainage    Edema                     Problem List       Asthma    Congenital bilateral hip instability    Overview     Patient had an elective primary  due to congenital hip issues since birth with first pregnancy and is planning on a repeat .  This is not a common indication for avoiding a vaginal delivery and she was given the opportunity to consider a  which she declined         30 weeks gestation of pregnancy    History of gestational hypertension    Overview     Dx at 31 wks. Weighed 70 lbs more at that point. Had medical weight loss          Current Assessment & Plan     She reports taking BP at home and it was as high as 138/86. Denies any HA/visual changes/CP/SOB/N/V/RUQ pain. Discussed concerning sx and potential reasons to check BP, risk of recurrence of gHTN         Supervision of high risk pregnancy in third trimester    Overview     Flu vaccine: completed   COVID vaccine: completed  NIPT low risk. AFP collected too late   Consents signed  S/p tdap          Current Assessment & Plan     Delivery consent reviewed and signed  Accepts tdap today  Growth US scheduled  OB precautions reviewed         History of  delivery, antepartum    Overview     H/o PPH requiring medications  Plans repeat C/S          Asthma affecting pregnancy in third trimester    Family history of von Willebrand disease    Overview     Her sister has a mild case and does not require treatment for surgery. Von Willebrand's activity and antigen ordered patient reports she does have heavy periods and they returned as normal however these levels will increase significantly in pregnancy and may be falsely normal.  If she has significant bleeding either during pregnancy or after pregnancy these levels will need to be repeated at least 12 weeks  outside of pregnancy.         Family history of thrombosis in first degree relative    Overview     She reports that her father has had multiple DVT prior to age 50 and many in association with surgery.  She will have her father signed a consent to see if he is a carrier for thrombophilia.  If he has not been screened then recommend screening her for inherited thrombophilia including Antithrombin III, protein C and protein S activity levels and prothrombin gene and factor V Leiden.         Encounter for care and examination of lactating mother     Other Visit Diagnoses       Encounter for immunization                                  Kaila Porter MD  4/25/2024  12:09 PM

## 2024-05-08 NOTE — PROGRESS NOTES
Please refer to the [facility] ultrasound report in Ob Procedures for additional information regarding today's visit

## 2024-05-09 ENCOUNTER — ULTRASOUND (OUTPATIENT)
Dept: PERINATAL CARE | Facility: OTHER | Age: 29
End: 2024-05-09
Payer: COMMERCIAL

## 2024-05-09 VITALS
HEART RATE: 98 BPM | DIASTOLIC BLOOD PRESSURE: 78 MMHG | WEIGHT: 225.8 LBS | HEIGHT: 65 IN | BODY MASS INDEX: 37.62 KG/M2 | SYSTOLIC BLOOD PRESSURE: 126 MMHG

## 2024-05-09 DIAGNOSIS — Z36.4 ULTRASOUND FOR ANTENATAL SCREENING FOR FETAL GROWTH RESTRICTION: Primary | ICD-10-CM

## 2024-05-09 DIAGNOSIS — Z87.59 HISTORY OF GESTATIONAL HYPERTENSION: ICD-10-CM

## 2024-05-09 DIAGNOSIS — Z3A.32 32 WEEKS GESTATION OF PREGNANCY: ICD-10-CM

## 2024-05-09 PROCEDURE — 76816 OB US FOLLOW-UP PER FETUS: CPT | Performed by: OBSTETRICS & GYNECOLOGY

## 2024-05-09 PROCEDURE — 99213 OFFICE O/P EST LOW 20 MIN: CPT | Performed by: OBSTETRICS & GYNECOLOGY

## 2024-05-09 NOTE — LETTER
May 9, 2024     Kaila Porter MD  [address] [facility]  [address] [place], [place] [id number]    Patient: Delphine Carney   YOB: 1995   Date of Visit: 5/9/2024       Dear Dr. Porter:    Thank you for referring Delphine Carney to me for evaluation. Below are my notes for this consultation.    If you have questions, please do not hesitate to call me. I look forward to following your patient along with you.         Sincerely,        Flavio Kim MD        CC: No Recipients    Flavio Kim MD  5/9/2024  2:06 PM  Sign when Signing Visit  Please refer to the [facility] ultrasound report in Ob Procedures for additional information regarding today's visit

## 2024-05-10 ENCOUNTER — ROUTINE PRENATAL (OUTPATIENT)
Dept: OBGYN CLINIC | Facility: CLINIC | Age: 29
End: 2024-05-10

## 2024-05-10 VITALS
BODY MASS INDEX: 38.12 KG/M2 | DIASTOLIC BLOOD PRESSURE: 72 MMHG | SYSTOLIC BLOOD PRESSURE: 132 MMHG | HEART RATE: 100 BPM | HEIGHT: 65 IN | WEIGHT: 228.8 LBS | OXYGEN SATURATION: 99 %

## 2024-05-10 DIAGNOSIS — O09.93 SUPERVISION OF HIGH RISK PREGNANCY IN THIRD TRIMESTER: ICD-10-CM

## 2024-05-10 DIAGNOSIS — J45.909 ASTHMA AFFECTING PREGNANCY IN THIRD TRIMESTER: ICD-10-CM

## 2024-05-10 DIAGNOSIS — Z87.59 HISTORY OF GESTATIONAL HYPERTENSION: ICD-10-CM

## 2024-05-10 DIAGNOSIS — Z3A.32 32 WEEKS GESTATION OF PREGNANCY: ICD-10-CM

## 2024-05-10 DIAGNOSIS — O99.513 ASTHMA AFFECTING PREGNANCY IN THIRD TRIMESTER: ICD-10-CM

## 2024-05-10 DIAGNOSIS — O34.219 HISTORY OF CESAREAN DELIVERY, ANTEPARTUM: ICD-10-CM

## 2024-05-10 DIAGNOSIS — R03.0 ELEVATED BLOOD PRESSURE READING WITHOUT DIAGNOSIS OF HYPERTENSION: Primary | ICD-10-CM

## 2024-05-10 PROCEDURE — PNV: Performed by: OBSTETRICS & GYNECOLOGY

## 2024-05-10 RX ORDER — ADHESIVE BANDAGE 3/4"
BANDAGE TOPICAL DAILY
Qty: 1 EACH | Refills: 0 | Status: SHIPPED | OUTPATIENT
Start: 2024-05-10

## 2024-05-10 NOTE — ASSESSMENT & PLAN NOTE
Taking BP at home and at work. BP cuff at home she reports is not so reliable. At work 3 days ago (Tuesday) took BP and it was highest 153/96. Was having headache, blurry vision. Sitting down made sx resolve. Went down to 130's/70's   On Weds reported 140's/90's. She currently reports no HA/visual changes or other concerning sx  Has h/o gHTN. Will obtain labs/UPC. Advised BP check next week. New BP cuff rx sent. Strict precautions reviewed

## 2024-05-10 NOTE — PROGRESS NOTES
"    S: 29 y.o.  32w4d here for routine prenatal visit.        Chief Complaint   Patient presents with    Routine Prenatal Visit     Patient says blood pressure has been elevated. Checks pressure at home. Highest pressure was 153/96. Has had pain in right side of belly. Had episode yesterday of nearly passing out. Got lightheaded, hot and shaky.          OB complaints:  Contractions: no  Leakage: no  Bleeding: no  Fetal movement: yes    Growth US yesterday EFW 81st%, no further scheduled       O:  /72 (BP Location: Right arm, Patient Position: Sitting, Cuff Size: Standard)   Pulse 100   Ht 5' 5\" (1.651 m)   Wt 104 kg (228 lb 12.8 oz)   SpO2 99%   BMI 38.07 kg/m²       Review of Systems   Constitutional:  Negative for chills and fever.   Eyes:  Negative for visual disturbance.   Respiratory:  Negative for chest tightness and shortness of breath.    Cardiovascular:  Negative for chest pain.   Gastrointestinal:  Negative for abdominal pain, diarrhea, nausea and vomiting.   Genitourinary:  Negative for pelvic pain and vaginal bleeding.        As noted in HPI   Skin:  Negative for rash.   Neurological:  Negative for headaches.   All other systems reviewed and are negative.        Physical Exam  Constitutional:       General: She is not in acute distress.     Appearance: Normal appearance.   HENT:      Head: Normocephalic and atraumatic.   Cardiovascular:      Rate and Rhythm: Normal rate.   Pulmonary:      Effort: Pulmonary effort is normal. No respiratory distress.   Abdominal:      General: There is no distension.      Palpations: Abdomen is soft.      Tenderness: There is no abdominal tenderness. There is no guarding or rebound.      Comments: gravid   Neurological:      General: No focal deficit present.      Mental Status: She is alert.   Psychiatric:         Mood and Affect: Mood normal.         Behavior: Behavior normal.   Vitals and nursing note reviewed.           Fundal Height (cm): 33 " cm  Fetal Heart Rate: 140    Pregravid Weight/BMI: 80.3 kg (177 lb) (BMI 29.45)  Current Weight: 104 kg (228 lb 12.8 oz)   Total Weight Gain: 23.5 kg (51 lb 12.8 oz)     Pre- Vitals      Flowsheet Row Most Recent Value   Prenatal Assessment    Fetal Heart Rate 140   Fundal Height (cm) 33 cm   Movement Present   Prenatal Vitals    Blood Pressure 132/72   Weight - Scale 104 kg (228 lb 12.8 oz)   Urine Albumin/Glucose    Dilation/Effacement/Station    Vaginal Drainage    Edema                     Problem List       Asthma    Congenital bilateral hip instability    Overview     Patient had an elective primary  due to congenital hip issues since birth with first pregnancy and is planning on a repeat .  This is not a common indication for avoiding a vaginal delivery and she was given the opportunity to consider a  which she declined         32 weeks gestation of pregnancy    History of gestational hypertension    Overview     Dx at 31 wks. Weighed 70 lbs more at that point. Had medical weight loss          Supervision of high risk pregnancy in third trimester    Overview     Flu vaccine: completed   COVID vaccine: completed  NIPT low risk. AFP collected too late   Consents signed  S/p tdap          History of  delivery, antepartum    Overview     H/o PPH requiring medications  Plans repeat C/S          Asthma affecting pregnancy in third trimester    Family history of von Willebrand disease    Overview     Her sister has a mild case and does not require treatment for surgery. Von Willebrand's activity and antigen ordered patient reports she does have heavy periods and they returned as normal however these levels will increase significantly in pregnancy and may be falsely normal.  If she has significant bleeding either during pregnancy or after pregnancy these levels will need to be repeated at least 12 weeks outside of pregnancy.         Family history of thrombosis in first degree  relative    Overview     She reports that her father has had multiple DVT prior to age 50 and many in association with surgery.  She will have her father signed a consent to see if he is a carrier for thrombophilia.  If he has not been screened then recommend screening her for inherited thrombophilia including Antithrombin III, protein C and protein S activity levels and prothrombin gene and factor V Leiden.         Encounter for care and examination of lactating mother    Elevated blood pressure reading without diagnosis of hypertension    Current Assessment & Plan     Taking BP at home and at work. BP cuff at home she reports is not so reliable. At work 3 days ago (Tuesday) took BP and it was highest 153/96. Was having headache, blurry vision. Sitting down made sx resolve. Went down to 130's/70's   On Weds reported 140's/90's. She currently reports no HA/visual changes or other concerning sx  Has h/o gHTN. Will obtain labs/UPC. Advised BP check next week. New BP cuff rx sent. Strict precautions reviewed                              Kaila Porter MD  5/10/2024  3:01 PM

## 2024-05-14 ENCOUNTER — APPOINTMENT (OUTPATIENT)
Dept: LAB | Facility: HOSPITAL | Age: 29
End: 2024-05-14
Payer: COMMERCIAL

## 2024-05-14 DIAGNOSIS — R03.0 ELEVATED BLOOD PRESSURE READING WITHOUT DIAGNOSIS OF HYPERTENSION: ICD-10-CM

## 2024-05-14 LAB
ALBUMIN SERPL BCP-MCNC: 3.6 G/DL (ref 3.5–5)
ALP SERPL-CCNC: 71 U/L (ref 34–104)
ALT SERPL W P-5'-P-CCNC: 9 U/L (ref 7–52)
ANION GAP SERPL CALCULATED.3IONS-SCNC: 5 MMOL/L (ref 4–13)
AST SERPL W P-5'-P-CCNC: 11 U/L (ref 13–39)
BILIRUB SERPL-MCNC: 0.23 MG/DL (ref 0.2–1)
BUN SERPL-MCNC: 5 MG/DL (ref 5–25)
CALCIUM SERPL-MCNC: 8.9 MG/DL (ref 8.4–10.2)
CHLORIDE SERPL-SCNC: 104 MMOL/L (ref 96–108)
CO2 SERPL-SCNC: 24 MMOL/L (ref 21–32)
CREAT SERPL-MCNC: 0.47 MG/DL (ref 0.6–1.3)
CREAT UR-MCNC: 22.5 MG/DL
ERYTHROCYTE [DISTWIDTH] IN BLOOD BY AUTOMATED COUNT: 13.2 % (ref 11.6–15.1)
GFR SERPL CREATININE-BSD FRML MDRD: 134 ML/MIN/1.73SQ M
GLUCOSE SERPL-MCNC: 100 MG/DL (ref 65–140)
HCT VFR BLD AUTO: 34.1 % (ref 34.8–46.1)
HGB BLD-MCNC: 11.6 G/DL (ref 11.5–15.4)
MCH RBC QN AUTO: 32 PG (ref 26.8–34.3)
MCHC RBC AUTO-ENTMCNC: 34 G/DL (ref 31.4–37.4)
MCV RBC AUTO: 94 FL (ref 82–98)
PLATELET # BLD AUTO: 175 THOUSANDS/UL (ref 149–390)
PMV BLD AUTO: 9.5 FL (ref 8.9–12.7)
POTASSIUM SERPL-SCNC: 3.6 MMOL/L (ref 3.5–5.3)
PROT SERPL-MCNC: 6.9 G/DL (ref 6.4–8.4)
PROT UR-MCNC: 7 MG/DL
PROT/CREAT UR: 0.31 MG/G{CREAT} (ref 0–0.1)
RBC # BLD AUTO: 3.62 MILLION/UL (ref 3.81–5.12)
SODIUM SERPL-SCNC: 133 MMOL/L (ref 135–147)
WBC # BLD AUTO: 9.39 THOUSAND/UL (ref 4.31–10.16)

## 2024-05-14 PROCEDURE — 82570 ASSAY OF URINE CREATININE: CPT

## 2024-05-14 PROCEDURE — 84156 ASSAY OF PROTEIN URINE: CPT

## 2024-05-14 PROCEDURE — 80053 COMPREHEN METABOLIC PANEL: CPT

## 2024-05-14 PROCEDURE — 36415 COLL VENOUS BLD VENIPUNCTURE: CPT

## 2024-05-14 PROCEDURE — 85027 COMPLETE CBC AUTOMATED: CPT

## 2024-05-15 ENCOUNTER — ROUTINE PRENATAL (OUTPATIENT)
Dept: OBGYN CLINIC | Facility: CLINIC | Age: 29
End: 2024-05-15
Payer: COMMERCIAL

## 2024-05-15 ENCOUNTER — TELEPHONE (OUTPATIENT)
Facility: HOSPITAL | Age: 29
End: 2024-05-15

## 2024-05-15 VITALS
HEART RATE: 84 BPM | HEIGHT: 65 IN | SYSTOLIC BLOOD PRESSURE: 130 MMHG | WEIGHT: 231.4 LBS | DIASTOLIC BLOOD PRESSURE: 70 MMHG | BODY MASS INDEX: 38.55 KG/M2 | OXYGEN SATURATION: 99 %

## 2024-05-15 DIAGNOSIS — O12.13 PROTEINURIA AFFECTING PREGNANCY IN THIRD TRIMESTER: ICD-10-CM

## 2024-05-15 DIAGNOSIS — Z82.49 FAMILY HISTORY OF THROMBOSIS IN FIRST DEGREE RELATIVE: ICD-10-CM

## 2024-05-15 DIAGNOSIS — Z87.59 HISTORY OF GESTATIONAL HYPERTENSION: ICD-10-CM

## 2024-05-15 DIAGNOSIS — J45.909 ASTHMA AFFECTING PREGNANCY IN THIRD TRIMESTER: ICD-10-CM

## 2024-05-15 DIAGNOSIS — Z3A.33 33 WEEKS GESTATION OF PREGNANCY: ICD-10-CM

## 2024-05-15 DIAGNOSIS — O14.00 ANTEPARTUM MILD PREECLAMPSIA: Primary | ICD-10-CM

## 2024-05-15 DIAGNOSIS — O09.93 SUPERVISION OF HIGH RISK PREGNANCY IN THIRD TRIMESTER: ICD-10-CM

## 2024-05-15 DIAGNOSIS — O99.513 ASTHMA AFFECTING PREGNANCY IN THIRD TRIMESTER: ICD-10-CM

## 2024-05-15 PROCEDURE — 76815 OB US LIMITED FETUS(S): CPT | Performed by: OBSTETRICS & GYNECOLOGY

## 2024-05-15 PROCEDURE — PNV: Performed by: OBSTETRICS & GYNECOLOGY

## 2024-05-15 PROCEDURE — 59025 FETAL NON-STRESS TEST: CPT | Performed by: OBSTETRICS & GYNECOLOGY

## 2024-05-15 NOTE — PROGRESS NOTES
"    S: 29 y.o.  33w2d here for routine prenatal visit.        Chief Complaint   Patient presents with    Routine Prenatal Visit         OB complaints:  Contractions: no  Leakage: no  Bleeding: no  Fetal movement: yes    Reports multiple elevations at home: ranging from 132-147/76-96, over the past week   Reports h/o migraines - denies any prescription medications for this   Reports ongoing headaches throughout the whole pregnancy which are somewhat relieved by tylenol/caffeine. 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   Eyes:  Negative for visual disturbance.   Respiratory:  Negative for chest tightness and shortness of breath.    Cardiovascular:  Negative for chest pain.   Gastrointestinal:  Negative for abdominal pain, diarrhea, nausea and vomiting.   Genitourinary:  Negative for pelvic pain and vaginal bleeding.        As noted in HPI   Skin:  Negative for rash.   Neurological:  Negative for headaches.   All other systems reviewed and are negative.        Physical Exam  Constitutional:       General: She is not in acute distress.     Appearance: Normal appearance.   HENT:      Head: Normocephalic and atraumatic.   Cardiovascular:      Rate and Rhythm: Normal rate.   Pulmonary:      Effort: Pulmonary effort is normal. No respiratory distress.   Abdominal:      General: There is no distension.      Palpations: Abdomen is soft.      Tenderness: There is no abdominal tenderness. There is no guarding or rebound.      Comments: gravid   Neurological:      General: No focal deficit present.      Mental Status: She is alert.   Psychiatric:         Mood and Affect: Mood normal.         Behavior: Behavior normal.   Vitals and nursing note reviewed.              Fetal Heart Rate: NST    Pregravid " Weight/BMI: 80.3 kg (177 lb) (BMI 29.45)  Current Weight: 105 kg (231 lb 6.4 oz)   Total Weight Gain: 24.7 kg (54 lb 6.4 oz)     Pre-Delonte Vitals      Flowsheet Row Most Recent Value   Prenatal Assessment    Fetal Heart Rate NST   Movement Present   Prenatal Vitals    Blood Pressure 130/70   Weight - Scale 105 kg (231 lb 6.4 oz)   Urine Albumin/Glucose    Dilation/Effacement/Station    Vaginal Drainage    Edema                 NST Completed today:  Baseline: 130 BPM  Variability: Moderate  Accelerations: Yes  Decelerations: None  Contractions: Not present  Nonstress Test Interpretation: Reactive      3rd TRIMESTER OBSTETRIC ULTRASOUND  5/15/2024  MD Nisha Damoncrow Carney is a  at 33w2d  INDICATION: preeclampsia without severe features      TECHNIQUE:   Limited transabdominal imaging was performed   The study includes volumetric sweeps and traditional still imaging technique.      FINDINGS:  A single intrauterine gestation is identified.  Cardiac activity detected   Fetal presentation:  cephalic    Amniotic Fluid Index:    Q1: 7.06cm  Q2: 4.25cm  Q3: 6.44cm  Q4: 4.43cm    IMPRESSION:     Single intrauterine pregnancy at 33w2d  Normal PUSHPA 22.18 cm        Problem List       Asthma    Congenital bilateral hip instability    Overview     Patient had an elective primary  due to congenital hip issues since birth with first pregnancy and is planning on a repeat .  This is not a common indication for avoiding a vaginal delivery and she was given the opportunity to consider a  which she declined         33 weeks gestation of pregnancy    History of gestational hypertension    Overview     Dx at 31 wks. Weighed 70 lbs more at that point. Had medical weight loss          Supervision of high risk pregnancy in third trimester    Overview     Flu vaccine: completed   COVID vaccine: completed  NIPT low risk. AFP collected too late   Consents signed  S/p tdap          History of   delivery, antepartum    Overview     H/o PPH requiring medications  Plans repeat C/S          Asthma affecting pregnancy in third trimester    Family history of von Willebrand disease    Overview     Her sister has a mild case and does not require treatment for surgery. Von Willebrand's activity and antigen ordered patient reports she does have heavy periods and they returned as normal however these levels will increase significantly in pregnancy and may be falsely normal.  If she has significant bleeding either during pregnancy or after pregnancy these levels will need to be repeated at least 12 weeks outside of pregnancy.         Family history of thrombosis in first degree relative    Overview     She reports that her father has had multiple DVT prior to age 50 and many in association with surgery.  She will have her father signed a consent to see if he is a carrier for thrombophilia.  If he has not been screened then recommend screening her for inherited thrombophilia including Antithrombin III, protein C and protein S activity levels and prothrombin gene and factor V Leiden.         Current Assessment & Plan     Thrombophilia panel ordered          Encounter for care and examination of lactating mother    Antepartum mild preeclampsia    Overview     Multiple mildly elevated BP's at home  UPC at 33 weeks 0.31  - twice weekly surveillance  - weekly labs  - delivery at 37 weeks          Current Assessment & Plan     Reviewed based on multiple elevations of BP and elevated UPC would consider as preeclampsia without severe features  Reviewed planned surveillance of twice weekly NST/PUSHPA, weekly labs   NST/PUSHPA performed today reactive/reassuring  No current signs of severe preeclampsia   Precautions reviewed                              Kaila Porter MD  5/15/2024  11:40 AM

## 2024-05-15 NOTE — ASSESSMENT & PLAN NOTE
Reviewed based on multiple elevations of BP and elevated UPC would consider as preeclampsia without severe features  Reviewed planned surveillance of twice weekly NST/PUSHPA, weekly labs   NST/PUSHPA performed today reactive/reassuring  No current signs of severe preeclampsia   Precautions reviewed

## 2024-05-15 NOTE — TELEPHONE ENCOUNTER
Called patient to schedule MFM appointment, based on referral issued to Maternal Fetal Medicine by OB office.    Left voicemail requesting patient to call back and schedule appointment, with office number for return call [phone].

## 2024-05-21 ENCOUNTER — ROUTINE PRENATAL (OUTPATIENT)
Dept: OBGYN CLINIC | Facility: CLINIC | Age: 29
End: 2024-05-21
Payer: COMMERCIAL

## 2024-05-21 VITALS
SYSTOLIC BLOOD PRESSURE: 142 MMHG | BODY MASS INDEX: 39.32 KG/M2 | HEIGHT: 65 IN | DIASTOLIC BLOOD PRESSURE: 70 MMHG | WEIGHT: 236 LBS | OXYGEN SATURATION: 98 % | HEART RATE: 92 BPM

## 2024-05-21 DIAGNOSIS — O14.00 ANTEPARTUM MILD PREECLAMPSIA: ICD-10-CM

## 2024-05-21 DIAGNOSIS — O99.513 ASTHMA AFFECTING PREGNANCY IN THIRD TRIMESTER: ICD-10-CM

## 2024-05-21 DIAGNOSIS — Z87.59 HISTORY OF GESTATIONAL HYPERTENSION: ICD-10-CM

## 2024-05-21 DIAGNOSIS — J45.909 ASTHMA AFFECTING PREGNANCY IN THIRD TRIMESTER: ICD-10-CM

## 2024-05-21 DIAGNOSIS — Z82.49 FAMILY HISTORY OF THROMBOSIS IN FIRST DEGREE RELATIVE: ICD-10-CM

## 2024-05-21 DIAGNOSIS — Z3A.34 34 WEEKS GESTATION OF PREGNANCY: Primary | ICD-10-CM

## 2024-05-21 DIAGNOSIS — O09.93 SUPERVISION OF HIGH RISK PREGNANCY IN THIRD TRIMESTER: ICD-10-CM

## 2024-05-21 PROCEDURE — 76815 OB US LIMITED FETUS(S): CPT | Performed by: OBSTETRICS & GYNECOLOGY

## 2024-05-21 PROCEDURE — PNV: Performed by: OBSTETRICS & GYNECOLOGY

## 2024-05-21 PROCEDURE — 59025 FETAL NON-STRESS TEST: CPT | Performed by: OBSTETRICS & GYNECOLOGY

## 2024-05-21 NOTE — PROGRESS NOTES
"    S: 29 y.o.  34w1d here for routine prenatal visit.        Chief Complaint   Patient presents with    Routine Prenatal Visit     Nst/percy. No concerns.          OB complaints:  Contractions: no  Leakage: no  Bleeding: no  Fetal movement: yes        O:  /70 (BP Location: Right arm, Patient Position: Sitting, Cuff Size: Standard)   Pulse 92   Ht 5' 5\" (1.651 m)   Wt 107 kg (236 lb)   SpO2 98%   BMI 39.27 kg/m²       Review of Systems   Constitutional:  Negative for chills and fever.   Eyes:  Negative for visual disturbance.   Respiratory:  Negative for chest tightness and shortness of breath.    Cardiovascular:  Negative for chest pain.   Gastrointestinal:  Negative for abdominal pain, diarrhea, nausea and vomiting.   Genitourinary:  Negative for pelvic pain and vaginal bleeding.        As noted in HPI   Skin:  Negative for rash.   Neurological:  Negative for headaches.   All other systems reviewed and are negative.        Physical Exam  Constitutional:       General: She is not in acute distress.     Appearance: Normal appearance.   HENT:      Head: Normocephalic and atraumatic.   Cardiovascular:      Rate and Rhythm: Normal rate.   Pulmonary:      Effort: Pulmonary effort is normal. No respiratory distress.   Abdominal:      General: There is no distension.      Palpations: Abdomen is soft.      Tenderness: There is no abdominal tenderness. There is no guarding or rebound.      Comments: gravid   Neurological:      General: No focal deficit present.      Mental Status: She is alert.   Psychiatric:         Mood and Affect: Mood normal.         Behavior: Behavior normal.   Vitals and nursing note reviewed.              Fetal Heart Rate: NST    Pregravid Weight/BMI: 80.3 kg (177 lb) (BMI 29.45)  Current Weight: 107 kg (236 lb)   Total Weight Gain: 26.8 kg (59 lb)     Pre-Delonte Vitals      Flowsheet Row Most Recent Value   Prenatal Assessment    Fetal Heart Rate NST   Movement Present   Prenatal " Vitals    Blood Pressure 142/70   Weight - Scale 107 kg (236 lb)   Urine Albumin/Glucose    Dilation/Effacement/Station    Vaginal Drainage    Edema                 NST Completed today:  Baseline: 130 BPM  Variability: Moderate  Accelerations: Yes  Decelerations: None  Contractions: Not present  Nonstress Test Interpretation: Reactive      3rd TRIMESTER OBSTETRIC ULTRASOUND  2024  Kaila Porter MD    Delphine Carney is a  at 34w1d  INDICATION: preeclampsia without severe features      TECHNIQUE:   Limited transabdominal imaging was performed   The study includes volumetric sweeps and traditional still imaging technique.      FINDINGS:  A single intrauterine gestation is identified.  Cardiac activity detected   Fetal presentation:  cephalic    Amniotic Fluid Index:    Q1: 3.22cm  Q2: 4.05cm  Q3: 4.99cm  Q4: 5.88cm    IMPRESSION:     Single intrauterine pregnancy at 34w1d  Normal PUSHPA 18.14 cm        Problem List       Asthma    Congenital bilateral hip instability    Overview     Patient had an elective primary  due to congenital hip issues since birth with first pregnancy and is planning on a repeat .  This is not a common indication for avoiding a vaginal delivery and she was given the opportunity to consider a  which she declined         34 weeks gestation of pregnancy    History of gestational hypertension    Overview     Dx at 31 wks. Weighed 70 lbs more at that point. Had medical weight loss          Supervision of high risk pregnancy in third trimester    Overview     Flu vaccine: completed   COVID vaccine: completed  NIPT low risk. AFP collected too late   Consents signed  S/p tdap          History of  delivery, antepartum    Overview     H/o PPH requiring medications  Plans repeat C/S          Asthma affecting pregnancy in third trimester    Family history of von Willebrand disease    Overview     Her sister has a mild case and does not require treatment for  surgery. Von Willebrand's activity and antigen ordered patient reports she does have heavy periods and they returned as normal however these levels will increase significantly in pregnancy and may be falsely normal.  If she has significant bleeding either during pregnancy or after pregnancy these levels will need to be repeated at least 12 weeks outside of pregnancy.         Family history of thrombosis in first degree relative    Overview     She reports that her father has had multiple DVT prior to age 50 and many in association with surgery.  She will have her father signed a consent to see if he is a carrier for thrombophilia.  If he has not been screened then recommend screening her for inherited thrombophilia including Antithrombin III, protein C and protein S activity levels and prothrombin gene and factor V Leiden - labs previously ordered         Current Assessment & Plan     She will complete with labwork tomorrow          Encounter for care and examination of lactating mother    Antepartum mild preeclampsia    Overview     Multiple mildly elevated BP's at home and at 34 weeks   UPC at 33 weeks 0.31  - twice weekly surveillance  - weekly labs  - delivery at 37 weeks          Current Assessment & Plan     Repeat growth US scheduled next week  NST/PUSHPA today reactive/reassuring, repeat NST is scheduled for later this week   Weekly labs ordered, she plans to go tomorrow   Reports BP's at home similar to last week up to 140's/90's   Reports constant mild headache is same as previously  No signs/sx severe preeclampsia. Strict precautions reviewed                              Kaila Porter MD  5/21/2024  3:38 PM

## 2024-05-21 NOTE — ASSESSMENT & PLAN NOTE
Repeat growth US scheduled next week  NST/PUSHPA today reactive/reassuring, repeat NST is scheduled for later this week   Weekly labs ordered, she plans to go tomorrow   Reports BP's at home similar to last week up to 140's/90's   Reports constant mild headache is same as previously  No signs/sx severe preeclampsia. Strict precautions reviewed

## 2024-05-24 ENCOUNTER — LAB (OUTPATIENT)
Dept: LAB | Facility: MEDICAL CENTER | Age: 29
End: 2024-05-24
Payer: COMMERCIAL

## 2024-05-24 ENCOUNTER — ROUTINE PRENATAL (OUTPATIENT)
Dept: OBGYN CLINIC | Facility: CLINIC | Age: 29
End: 2024-05-24
Payer: COMMERCIAL

## 2024-05-24 VITALS
DIASTOLIC BLOOD PRESSURE: 78 MMHG | HEART RATE: 86 BPM | WEIGHT: 234 LBS | BODY MASS INDEX: 38.99 KG/M2 | HEIGHT: 65 IN | OXYGEN SATURATION: 99 % | SYSTOLIC BLOOD PRESSURE: 138 MMHG

## 2024-05-24 DIAGNOSIS — Z87.59 HISTORY OF GESTATIONAL HYPERTENSION: ICD-10-CM

## 2024-05-24 DIAGNOSIS — O09.93 SUPERVISION OF HIGH RISK PREGNANCY IN THIRD TRIMESTER: ICD-10-CM

## 2024-05-24 DIAGNOSIS — J45.909 ASTHMA AFFECTING PREGNANCY IN THIRD TRIMESTER: ICD-10-CM

## 2024-05-24 DIAGNOSIS — Z82.49 FAMILY HISTORY OF THROMBOSIS IN FIRST DEGREE RELATIVE: ICD-10-CM

## 2024-05-24 DIAGNOSIS — Z3A.34 34 WEEKS GESTATION OF PREGNANCY: ICD-10-CM

## 2024-05-24 DIAGNOSIS — O14.00 ANTEPARTUM MILD PREECLAMPSIA: ICD-10-CM

## 2024-05-24 DIAGNOSIS — O99.513 ASTHMA AFFECTING PREGNANCY IN THIRD TRIMESTER: ICD-10-CM

## 2024-05-24 DIAGNOSIS — O14.00 ANTEPARTUM MILD PREECLAMPSIA: Primary | ICD-10-CM

## 2024-05-24 LAB
ALBUMIN SERPL BCP-MCNC: 3.5 G/DL (ref 3.5–5)
ALP SERPL-CCNC: 80 U/L (ref 34–104)
ALT SERPL W P-5'-P-CCNC: 10 U/L (ref 7–52)
ANION GAP SERPL CALCULATED.3IONS-SCNC: 9 MMOL/L (ref 4–13)
AST SERPL W P-5'-P-CCNC: 12 U/L (ref 13–39)
BILIRUB SERPL-MCNC: 0.26 MG/DL (ref 0.2–1)
BUN SERPL-MCNC: 6 MG/DL (ref 5–25)
CALCIUM SERPL-MCNC: 8.5 MG/DL (ref 8.4–10.2)
CHLORIDE SERPL-SCNC: 105 MMOL/L (ref 96–108)
CO2 SERPL-SCNC: 21 MMOL/L (ref 21–32)
CREAT SERPL-MCNC: 0.38 MG/DL (ref 0.6–1.3)
ERYTHROCYTE [DISTWIDTH] IN BLOOD BY AUTOMATED COUNT: 13.6 % (ref 11.6–15.1)
GFR SERPL CREATININE-BSD FRML MDRD: 144 ML/MIN/1.73SQ M
GLUCOSE P FAST SERPL-MCNC: 77 MG/DL (ref 65–99)
HCT VFR BLD AUTO: 35.2 % (ref 34.8–46.1)
HGB BLD-MCNC: 11.8 G/DL (ref 11.5–15.4)
MCH RBC QN AUTO: 31.9 PG (ref 26.8–34.3)
MCHC RBC AUTO-ENTMCNC: 33.5 G/DL (ref 31.4–37.4)
MCV RBC AUTO: 95 FL (ref 82–98)
PLATELET # BLD AUTO: 188 THOUSANDS/UL (ref 149–390)
PMV BLD AUTO: 10.3 FL (ref 8.9–12.7)
POTASSIUM SERPL-SCNC: 3.9 MMOL/L (ref 3.5–5.3)
PROT SERPL-MCNC: 6.7 G/DL (ref 6.4–8.4)
RBC # BLD AUTO: 3.7 MILLION/UL (ref 3.81–5.12)
SODIUM SERPL-SCNC: 135 MMOL/L (ref 135–147)
WBC # BLD AUTO: 8.53 THOUSAND/UL (ref 4.31–10.16)

## 2024-05-24 PROCEDURE — 85670 THROMBIN TIME PLASMA: CPT

## 2024-05-24 PROCEDURE — PNV: Performed by: OBSTETRICS & GYNECOLOGY

## 2024-05-24 PROCEDURE — 80053 COMPREHEN METABOLIC PANEL: CPT

## 2024-05-24 PROCEDURE — 85300 ANTITHROMBIN III ACTIVITY: CPT

## 2024-05-24 PROCEDURE — 85306 CLOT INHIBIT PROT S FREE: CPT

## 2024-05-24 PROCEDURE — 85303 CLOT INHIBIT PROT C ACTIVITY: CPT

## 2024-05-24 PROCEDURE — 85732 THROMBOPLASTIN TIME PARTIAL: CPT

## 2024-05-24 PROCEDURE — 85705 THROMBOPLASTIN INHIBITION: CPT

## 2024-05-24 PROCEDURE — 59025 FETAL NON-STRESS TEST: CPT | Performed by: OBSTETRICS & GYNECOLOGY

## 2024-05-24 PROCEDURE — 85613 RUSSELL VIPER VENOM DILUTED: CPT

## 2024-05-24 PROCEDURE — 85305 CLOT INHIBIT PROT S TOTAL: CPT

## 2024-05-24 PROCEDURE — 85027 COMPLETE CBC AUTOMATED: CPT

## 2024-05-24 PROCEDURE — 86147 CARDIOLIPIN ANTIBODY EA IG: CPT

## 2024-05-24 PROCEDURE — 36415 COLL VENOUS BLD VENIPUNCTURE: CPT

## 2024-05-24 PROCEDURE — 86146 BETA-2 GLYCOPROTEIN ANTIBODY: CPT

## 2024-05-24 NOTE — PROGRESS NOTES
"    S: 29 y.o.  34w4d here for routine prenatal visit.        Chief Complaint   Patient presents with    Non-stress Test         OB complaints:  Contractions: no  Leakage: no  Bleeding: no  Fetal movement: yes      O:  /78 (BP Location: Right arm, Patient Position: Sitting, Cuff Size: Standard)   Pulse 86   Ht 5' 5\" (1.651 m)   Wt 106 kg (234 lb)   SpO2 99%   BMI 38.94 kg/m²       Review of Systems   Constitutional:  Negative for chills and fever.   Eyes:  Negative for visual disturbance.   Respiratory:  Negative for chest tightness and shortness of breath.    Cardiovascular:  Negative for chest pain.   Gastrointestinal:  Negative for abdominal pain, diarrhea, nausea and vomiting.   Genitourinary:  Negative for pelvic pain and vaginal bleeding.        As noted in HPI   Skin:  Negative for rash.   Neurological:  Negative for headaches.   All other systems reviewed and are negative.        Physical Exam  Constitutional:       General: She is not in acute distress.     Appearance: Normal appearance.   HENT:      Head: Normocephalic and atraumatic.   Cardiovascular:      Rate and Rhythm: Normal rate.   Pulmonary:      Effort: Pulmonary effort is normal. No respiratory distress.   Abdominal:      General: There is no distension.      Palpations: Abdomen is soft.      Tenderness: There is no abdominal tenderness. There is no guarding or rebound.      Comments: gravid   Neurological:      General: No focal deficit present.      Mental Status: She is alert.   Psychiatric:         Mood and Affect: Mood normal.         Behavior: Behavior normal.   Vitals and nursing note reviewed.              Fetal Heart Rate: NST    Pregravid Weight/BMI: 80.3 kg (177 lb) (BMI 29.45)  Current Weight: 106 kg (234 lb)   Total Weight Gain: 25.9 kg (57 lb)     Pre- Vitals      Flowsheet Row Most Recent Value   Prenatal Assessment    Fetal Heart Rate NST   Movement Present   Prenatal Vitals    Blood Pressure 138/78   Weight " - Scale 106 kg (234 lb)   Urine Albumin/Glucose    Dilation/Effacement/Station    Vaginal Drainage    Edema                 NST Completed today:  Baseline: 125 BPM  Variability: Moderate  Accelerations: Yes  Decelerations: None  Contractions: Not present  Nonstress Test Interpretation: Reactive        Problem List       Asthma    Congenital bilateral hip instability    Overview     Patient had an elective primary  due to congenital hip issues since birth with first pregnancy and is planning on a repeat .  This is not a common indication for avoiding a vaginal delivery and she was given the opportunity to consider a  which she declined         34 weeks gestation of pregnancy    History of gestational hypertension    Overview     Dx at 31 wks. Weighed 70 lbs more at that point. Had medical weight loss          Supervision of high risk pregnancy in third trimester    Overview     Flu vaccine: completed   COVID vaccine: completed  NIPT low risk. AFP collected too late   Consents signed  S/p tdap          History of  delivery, antepartum    Overview     H/o PPH requiring medications  Plans repeat C/S          Asthma affecting pregnancy in third trimester    Family history of von Willebrand disease    Overview     Her sister has a mild case and does not require treatment for surgery. Von Willebrand's activity and antigen ordered patient reports she does have heavy periods and they returned as normal however these levels will increase significantly in pregnancy and may be falsely normal.  If she has significant bleeding either during pregnancy or after pregnancy these levels will need to be repeated at least 12 weeks outside of pregnancy.         Family history of thrombosis in first degree relative    Overview     She reports that her father has had multiple DVT prior to age 50 and many in association with surgery.  She will have her father signed a consent to see if he is a carrier for  thrombophilia.  If he has not been screened then recommend screening her for inherited thrombophilia including Antithrombin III, protein C and protein S activity levels and prothrombin gene and factor V Leiden - labs previously ordered         Encounter for care and examination of lactating mother    Antepartum mild preeclampsia    Overview     Multiple mildly elevated BP's at home and at 34 weeks   UPC at 33 weeks 0.31  - twice weekly surveillance  - weekly labs  - delivery at 37 weeks          Current Assessment & Plan     NST today reactive/reassuring   She had labs drawn today, pending  BP's at home mostly 130's, some 140's systolic, diastolic 70s, occasional 90's  Advised if there is persistent/abrupt increase she needs to contact office   She will return for next week's NST after the growth US   No current signs/sx severe preeclampsia  Precautions reviewed                              Kaila Porter MD  5/24/2024  3:15 PM

## 2024-05-24 NOTE — ASSESSMENT & PLAN NOTE
NST today reactive/reassuring   She had labs drawn today, pending  BP's at home mostly 130's, some 140's systolic, diastolic 70s, occasional 90's  Advised if there is persistent/abrupt increase she needs to contact office   She will return for next week's NST after the growth US   No current signs/sx severe preeclampsia  Precautions reviewed

## 2024-05-25 LAB — DEPRECATED AT III PPP: 90 % OF NORMAL (ref 92–136)

## 2024-05-26 LAB
APTT SCREEN TO CONFIRM RATIO: 1.17 RATIO (ref 0–1.34)
CONFIRM APTT/NORMAL: 33.6 SEC (ref 0–47.6)
LA PPP-IMP: NORMAL
PROT S ACT/NOR PPP: 44 % (ref 61–136)
PROT S PPP-ACNC: 42 % (ref 60–150)
SCREEN APTT: 39.3 SEC (ref 0–43.5)
SCREEN DRVVT: 43 SEC (ref 0–47)
THROMBIN TIME: 14.2 SEC (ref 0–23)

## 2024-05-27 LAB
PROT C AG ACT/NOR PPP IA: 75 % OF NORMAL (ref 60–150)
PROT S ACT/NOR PPP: 37 % (ref 68–108)

## 2024-05-28 PROBLEM — Z3A.35 35 WEEKS GESTATION OF PREGNANCY: Status: ACTIVE | Noted: 2024-01-29

## 2024-05-28 LAB
B2 GLYCOPROT1 IGA SERPL IA-ACNC: 2.1
B2 GLYCOPROT1 IGG SERPL IA-ACNC: <0.8
B2 GLYCOPROT1 IGM SERPL IA-ACNC: <2.4
CARDIOLIPIN IGA SER IA-ACNC: 4.8
CARDIOLIPIN IGG SER IA-ACNC: 1.6
CARDIOLIPIN IGM SER IA-ACNC: 1.8

## 2024-05-29 ENCOUNTER — ULTRASOUND (OUTPATIENT)
Dept: PERINATAL CARE | Facility: OTHER | Age: 29
End: 2024-05-29
Payer: COMMERCIAL

## 2024-05-29 VITALS
HEART RATE: 87 BPM | DIASTOLIC BLOOD PRESSURE: 70 MMHG | HEIGHT: 65 IN | BODY MASS INDEX: 39.49 KG/M2 | WEIGHT: 237 LBS | SYSTOLIC BLOOD PRESSURE: 122 MMHG

## 2024-05-29 DIAGNOSIS — Z87.59 HISTORY OF GESTATIONAL HYPERTENSION: ICD-10-CM

## 2024-05-29 DIAGNOSIS — Z3A.35 35 WEEKS GESTATION OF PREGNANCY: Primary | ICD-10-CM

## 2024-05-29 DIAGNOSIS — O14.00 ANTEPARTUM MILD PREECLAMPSIA: ICD-10-CM

## 2024-05-29 DIAGNOSIS — Z36.89 ENCOUNTER FOR ULTRASOUND TO CHECK FETAL GROWTH: ICD-10-CM

## 2024-05-29 PROCEDURE — 99214 OFFICE O/P EST MOD 30 MIN: CPT | Performed by: OBSTETRICS & GYNECOLOGY

## 2024-05-29 PROCEDURE — 59025 FETAL NON-STRESS TEST: CPT | Performed by: OBSTETRICS & GYNECOLOGY

## 2024-05-29 PROCEDURE — 76816 OB US FOLLOW-UP PER FETUS: CPT | Performed by: OBSTETRICS & GYNECOLOGY

## 2024-05-29 NOTE — PROGRESS NOTES
"[facility]: Delphine Carney was seen today for NST (found under the pregnancy episode) which I reviewed the RN assessment and agree, and fetal growth ultrasound (see ultrasound report under OB procedures tab).  See ultrasound report under \"OB Procedures\" tab.   The time spent on this established patient on the encounter date included 6 minutes previsit service time reviewing records and precharting, 10 minutes face-to-face service time counseling regarding results and coordinating care, and  5 minutes charting, totalling 21 minutes.  Please don't hesitate to contact our office with any concerns or questions.  -Radha Barton MD    "

## 2024-05-29 NOTE — PROGRESS NOTES
Non-Stress Testing:    Non-Stress test, equipment, procedure, and expected outcomes explained. Reviewed fetal kick counts and when to call OB.Verified patient understanding of fetal kick counts with teach back method. Patient reports feeling daily fetal movements. Patient has no questions or concerns.          NST reviewed by Dr. Barton.

## 2024-05-29 NOTE — LETTER
NST sleeve cover sheet    Patient name: Delphine Carney     : 1995  MRN: [id number]    JOHANA: Estimated Date of Delivery: 24    Obstetrician: _______________________________    Reason(s) for testing:  ___preeclampsia w/o severe features_________________________      Testing frequency:    ___ 2x/wk  ___ 1x/wk  ___ Dopplers  ___ BPP?      Last growth scan: __________________________________________

## 2024-05-31 ENCOUNTER — ROUTINE PRENATAL (OUTPATIENT)
Dept: OBGYN CLINIC | Facility: CLINIC | Age: 29
End: 2024-05-31
Payer: COMMERCIAL

## 2024-05-31 VITALS
WEIGHT: 236 LBS | HEART RATE: 85 BPM | DIASTOLIC BLOOD PRESSURE: 70 MMHG | SYSTOLIC BLOOD PRESSURE: 140 MMHG | HEIGHT: 65 IN | BODY MASS INDEX: 39.32 KG/M2 | OXYGEN SATURATION: 100 %

## 2024-05-31 DIAGNOSIS — Z87.59 HISTORY OF GESTATIONAL HYPERTENSION: ICD-10-CM

## 2024-05-31 DIAGNOSIS — Z3A.35 35 WEEKS GESTATION OF PREGNANCY: Primary | ICD-10-CM

## 2024-05-31 DIAGNOSIS — O99.513 ASTHMA AFFECTING PREGNANCY IN THIRD TRIMESTER: ICD-10-CM

## 2024-05-31 DIAGNOSIS — J45.909 ASTHMA AFFECTING PREGNANCY IN THIRD TRIMESTER: ICD-10-CM

## 2024-05-31 DIAGNOSIS — O09.93 SUPERVISION OF HIGH RISK PREGNANCY IN THIRD TRIMESTER: ICD-10-CM

## 2024-05-31 DIAGNOSIS — O14.00 ANTEPARTUM MILD PREECLAMPSIA: ICD-10-CM

## 2024-05-31 PROCEDURE — 59025 FETAL NON-STRESS TEST: CPT | Performed by: OBSTETRICS & GYNECOLOGY

## 2024-05-31 PROCEDURE — PNV: Performed by: OBSTETRICS & GYNECOLOGY

## 2024-05-31 NOTE — PROGRESS NOTES
"    S: 29 y.o.  35w4d here for routine prenatal visit.        Chief Complaint   Patient presents with    Routine Prenatal Visit     NST         OB complaints:  Contractions: no  Leakage: no  Bleeding: no  Fetal movement: yes    Had growth US this week  - EFW 58th%, breech presentation   Last labs were on      O:  /70   Pulse 85   Ht 5' 5\" (1.651 m)   Wt 107 kg (236 lb)   SpO2 100%   BMI 39.27 kg/m²       Review of Systems   Constitutional:  Negative for chills and fever.   Eyes:  Negative for visual disturbance.   Respiratory:  Negative for chest tightness and shortness of breath.    Cardiovascular:  Negative for chest pain.   Gastrointestinal:  Negative for abdominal pain, diarrhea, nausea and vomiting.   Genitourinary:  Negative for pelvic pain and vaginal bleeding.        As noted in HPI   Skin:  Negative for rash.   Neurological:  Negative for headaches.   All other systems reviewed and are negative.        Physical Exam  Constitutional:       General: She is not in acute distress.     Appearance: Normal appearance.   HENT:      Head: Normocephalic and atraumatic.   Cardiovascular:      Rate and Rhythm: Normal rate.   Pulmonary:      Effort: Pulmonary effort is normal. No respiratory distress.   Abdominal:      General: There is no distension.      Palpations: Abdomen is soft.      Tenderness: There is no abdominal tenderness. There is no guarding or rebound.      Comments: gravid   Neurological:      General: No focal deficit present.      Mental Status: She is alert.   Psychiatric:         Mood and Affect: Mood normal.         Behavior: Behavior normal.   Vitals and nursing note reviewed.              Fetal Heart Rate: NST    Pregravid Weight/BMI: 80.3 kg (177 lb) (BMI 29.45)  Current Weight: 107 kg (236 lb)   Total Weight Gain: 26.8 kg (59 lb)     Pre- Vitals      Flowsheet Row Most Recent Value   Prenatal Assessment    Fetal Heart Rate NST   Movement Present   Prenatal Vitals  "   Blood Pressure 140/70   Weight - Scale 107 kg (236 lb)   Urine Albumin/Glucose    Dilation/Effacement/Station    Vaginal Drainage    Edema                 NST Completed today:  Baseline: 135 BPM  Variability: Moderate  Accelerations: Yes  Decelerations: None  Contractions: Not present  Nonstress Test Interpretation: Reactive        Problem List       Asthma    Congenital bilateral hip instability    Overview     Patient had an elective primary  due to congenital hip issues since birth with first pregnancy and is planning on a repeat .  This is not a common indication for avoiding a vaginal delivery and she was given the opportunity to consider a  which she declined         35 weeks gestation of pregnancy    History of gestational hypertension    Overview     Dx at 31 wks. Weighed 70 lbs more at that point. Had medical weight loss          Supervision of high risk pregnancy in third trimester    Overview     Flu vaccine: completed   COVID vaccine: completed  NIPT low risk. AFP collected too late   Consents signed  S/p tdap          Current Assessment & Plan     OB precautions reviewed         History of  delivery, antepartum    Overview     H/o PPH requiring medications  Plans repeat C/S          Asthma affecting pregnancy in third trimester    Family history of von Willebrand disease    Overview     Her sister has a mild case and does not require treatment for surgery. Von Willebrand's activity and antigen ordered patient reports she does have heavy periods and they returned as normal however these levels will increase significantly in pregnancy and may be falsely normal.  If she has significant bleeding either during pregnancy or after pregnancy these levels will need to be repeated at least 12 weeks outside of pregnancy.         Family history of thrombosis in first degree relative    Overview     She reports that her father has had multiple DVT prior to age 50 and many in  association with surgery.  She will have her father signed a consent to see if he is a carrier for thrombophilia.  If he has not been screened then recommend screening her for inherited thrombophilia including Antithrombin III, protein C and protein S activity levels and prothrombin gene and factor V Leiden - results normal for pregnancy          Encounter for care and examination of lactating mother    Antepartum mild preeclampsia    Overview     Multiple mildly elevated BP's at home and at 34 weeks, 35 week office visits   UPC at 33 weeks 0.31  - twice weekly surveillance  - weekly labs  - delivery at 37 weeks          Current Assessment & Plan     NST today reactive/reassuring  She plans on obtaining repeat labs today after this appt, advised to continue weekly  BP's at home this week were normal 120's-130's/70's-80's  No current signs/sx severe preeclampsia                               Kaila Porter MD  5/31/2024  11:09 AM

## 2024-05-31 NOTE — ASSESSMENT & PLAN NOTE
NST today reactive/reassuring  She plans on obtaining repeat labs today after this appt, advised to continue weekly  BP's at home this week were normal 120's-130's/70's-80's  No current signs/sx severe preeclampsia

## 2024-06-03 ENCOUNTER — ROUTINE PRENATAL (OUTPATIENT)
Dept: OBGYN CLINIC | Facility: CLINIC | Age: 29
End: 2024-06-03
Payer: COMMERCIAL

## 2024-06-03 VITALS
SYSTOLIC BLOOD PRESSURE: 126 MMHG | DIASTOLIC BLOOD PRESSURE: 80 MMHG | BODY MASS INDEX: 39.75 KG/M2 | WEIGHT: 238.6 LBS | HEIGHT: 65 IN

## 2024-06-03 DIAGNOSIS — O14.00 ANTEPARTUM MILD PREECLAMPSIA: ICD-10-CM

## 2024-06-03 DIAGNOSIS — Z3A.36 36 WEEKS GESTATION OF PREGNANCY: Primary | ICD-10-CM

## 2024-06-03 DIAGNOSIS — Z87.59 HISTORY OF GESTATIONAL HYPERTENSION: ICD-10-CM

## 2024-06-03 DIAGNOSIS — Z36.85 ENCOUNTER FOR ANTENATAL SCREENING FOR STREPTOCOCCUS B: ICD-10-CM

## 2024-06-03 DIAGNOSIS — O09.93 SUPERVISION OF HIGH RISK PREGNANCY IN THIRD TRIMESTER: ICD-10-CM

## 2024-06-03 DIAGNOSIS — O99.513 ASTHMA AFFECTING PREGNANCY IN THIRD TRIMESTER: ICD-10-CM

## 2024-06-03 DIAGNOSIS — O34.219 HISTORY OF CESAREAN DELIVERY, CURRENTLY PREGNANT: ICD-10-CM

## 2024-06-03 DIAGNOSIS — J45.909 ASTHMA AFFECTING PREGNANCY IN THIRD TRIMESTER: ICD-10-CM

## 2024-06-03 DIAGNOSIS — Z30.2 REQUEST FOR STERILIZATION: ICD-10-CM

## 2024-06-03 PROCEDURE — PNV: Performed by: OBSTETRICS & GYNECOLOGY

## 2024-06-03 PROCEDURE — 87150 DNA/RNA AMPLIFIED PROBE: CPT | Performed by: OBSTETRICS & GYNECOLOGY

## 2024-06-03 PROCEDURE — 59025 FETAL NON-STRESS TEST: CPT | Performed by: OBSTETRICS & GYNECOLOGY

## 2024-06-03 PROCEDURE — 76815 OB US LIMITED FETUS(S): CPT | Performed by: OBSTETRICS & GYNECOLOGY

## 2024-06-03 NOTE — PROGRESS NOTES
"OB/GYN  PN Visit  Delphine Carney  [id number]  6/3/2024  4:41 PM  Dr. Nini Cantu MD    S: 29 y.o.  36w0d here for PN visit.       Chief Complaint   Patient presents with    Routine Prenatal Visit     NST/PUSHPA         OB complaints:  Contractions: no  Leakage: no  Bleeding: no  Fetal movement: yes    Occasional contractions and pelvic pressure - declines pelvic exam      O:  /80   Ht 5' 5\" (1.651 m)   Wt 108 kg (238 lb 9.6 oz)   BMI 39.71 kg/m²       Review of Systems   Constitutional: Negative.    HENT: Negative.     Eyes: Negative.    Respiratory: Negative.     Cardiovascular: Negative.    Gastrointestinal: Negative.    Endocrine: Negative.    Genitourinary:         As noted in HPI   Musculoskeletal: Negative.    Skin: Negative.    Allergic/Immunologic: Negative.    Neurological: Negative.    Hematological: Negative.    Psychiatric/Behavioral: Negative.           Physical Exam  Constitutional:       General: She is not in acute distress.     Appearance: Normal appearance. She is well-developed.   Genitourinary:      Right Labia: No rash, tenderness, lesions, skin changes or Bartholin's cyst.     Left Labia: No tenderness, lesions, skin changes, Bartholin's cyst or rash.     No labial fusion noted.      No inguinal adenopathy present in the right or left side.     Pelvic exam was performed with patient in the lithotomy position.   HENT:      Head: Normocephalic.   Cardiovascular:      Rate and Rhythm: Normal rate.   Pulmonary:      Effort: Pulmonary effort is normal.   Abdominal:      General: There is no distension.      Palpations: Abdomen is soft.      Tenderness: There is no abdominal tenderness. There is no guarding.      Hernia: There is no hernia in the left inguinal area or right inguinal area.   Musculoskeletal:         General: No swelling or deformity.   Lymphadenopathy:      Lower Body: No right inguinal adenopathy. No left inguinal adenopathy.   Neurological:      General: No focal " deficit present.      Mental Status: She is alert and oriented to person, place, and time.   Skin:     General: Skin is warm and dry.   Psychiatric:         Mood and Affect: Mood normal.         Behavior: Behavior normal.   Vitals reviewed.             Pregravid Weight/BMI: 80.3 kg (177 lb) (BMI 29.45)  Current Weight: 108 kg (238 lb 9.6 oz)   Total Weight Gain: 27.9 kg (61 lb 9.6 oz)   Pre- Vitals      Flowsheet Row Most Recent Value   Prenatal Assessment    Fetal Heart Rate NST   Fundal Height (cm) 36 cm   Movement Present   Prenatal Vitals    Blood Pressure 126/80   Weight - Scale 108 kg (238 lb 9.6 oz)   Urine Albumin/Glucose    Dilation/Effacement/Station    Vaginal Drainage    Edema              Problem List          Cardiovascular and Mediastinum    Antepartum mild preeclampsia    Overview     Multiple mildly elevated BP's at home and at 34 weeks, 35 week office visits   UPC at 33 weeks 0.31  - twice weekly surveillance  - weekly labs  - delivery at 37 weeks             Respiratory    Asthma    Asthma affecting pregnancy in third trimester       Musculoskeletal and Integument    Congenital bilateral hip instability    Overview     Patient had an elective primary  due to congenital hip issues since birth with first pregnancy and is planning on a repeat .  This is not a common indication for avoiding a vaginal delivery and she was given the opportunity to consider a  which she declined            Surgery/Wound/Pain    History of  delivery, antepartum    Overview     H/o PPH requiring medications  Plans repeat C/S             Obstetrics/Gynecology    36 weeks gestation of pregnancy    History of gestational hypertension    Overview     Dx at 31 wks. Weighed 70 lbs more at that point. Had medical weight loss          Supervision of high risk pregnancy in third trimester    Overview     Flu vaccine: completed   COVID vaccine: completed  NIPT low risk. AFP collected too late    Consents signed  S/p tdap          Encounter for care and examination of lactating mother       Other    Family history of von Willebrand disease    Overview     Her sister has a mild case and does not require treatment for surgery. Von Willebrand's activity and antigen ordered patient reports she does have heavy periods and they returned as normal however these levels will increase significantly in pregnancy and may be falsely normal.  If she has significant bleeding either during pregnancy or after pregnancy these levels will need to be repeated at least 12 weeks outside of pregnancy.         Family history of thrombosis in first degree relative    Overview     She reports that her father has had multiple DVT prior to age 50 and many in association with surgery.  She will have her father signed a consent to see if he is a carrier for thrombophilia.  If he has not been screened then recommend screening her for inherited thrombophilia including Antithrombin III, protein C and protein S activity levels and prothrombin gene and factor V Leiden - results normal for pregnancy           Other Visit Diagnoses       History of  delivery, currently pregnant        Encounter for  screening for Streptococcus B        Request for sterilization               GBS collected  NKDA  NST and PUSHPA done today  37 week delivery for preeclampsia without severe features, doing twice weekly testing  Preop labs ordered  Patient also requests permanent sterilization.  Discussed with patient sterilization is permanent and irreversible.  Signed consents for bilateral salpingectomy at the time of  delivery.  Discussed with patient additional risk of bleeding, increased risk of tubal or ectopic pregnancy. Surgical consent signed  Chlorhexidine was given    Follow-up in 3 days for NST    Future Appointments   Date Time Provider Department Center   2024  1:30 PM Kaila Porter MD Complete  [facility]    8/22/2024 11:20 AM PETE Santacruz [facility]                Nini Cantu MD  6/3/2024  4:41 PM

## 2024-06-03 NOTE — PATIENT INSTRUCTIONS
Admission Instructions for [facility]    Arriving at [facility] the day of your scheduled   Arrive 2 hours before your scheduled surgery time  Remove nail polish and all body piercing jewelry.  Do not shave any body part for at least 24 hours prior to surgery.  Plan to wear clothing that is easy to put on and take off.    Follow instructions regarding CHLORHEXIDINE WASH (see below)  Report to Registration Desk.   You will be directed to the [facility] from there. For those reporting to the hospital before 6am, you may need to enter through the ER entrance.   Dietary Restrictions (It is vitally important that you follow these instructions exactly)  DO NOT eat or drink anything after midnight, not even WATER! This includes candy and chewing gum.You may have a light dinner early in the evening up to 9pm and then liquids until midnight.   Failure to follow these instructions could result in a delay or cancellation of your surgery.   Do not smoke or drink alcohol for 24 hours before surgery  Your Obstetrician will tell you what medicines to take or not take on the day of your surgery. If medications have to be taken, take it with small sips of water        Labor and delivery will call you after 5:00 p.m. the day before your scheduled procedure to confirm your arrival time.  If you have not received a call by 8:00 p.m. the day before surgery, call [phone].        Chlorhexidine WASH    Purchase Chlorhexidine solution (HIBICLENS 4%) at your local pharmacy if not already supplied by the office    Evening before surgery    Shower 1:  The evening before surgery, take your first shower.  Shampoo your hair with regular shampoo and rinse it completely before you wash your body.  Next, wash your body from head to toe with soap and a clean washcloth.    Use 1/2 of the bottle of chlorhexidine (you will use the 2nd half of the bottle for your shower in the morning)    Do not get  chlorhexidine in your eyes, ears, nose, mouth or vaginal area.    Pay special attention to the area where incision will be and wash this area well will chlorhexidine soap for about 2 minutes.    Do not use any other soap or body rinse under skin during or after the antiseptic soap.    Rinse yourself completely with running water.  Use a clean towel to dry off.  Put on clean underwear and clothing.  Sleep on clean bed linens and she sheets.        Day of surgery    Shower 2:  Follow the same steps as shower 1.  Use the second half of the bottle of chlorhexidine soap.

## 2024-06-05 LAB — GP B STREP DNA SPEC QL NAA+PROBE: NEGATIVE

## 2024-06-06 ENCOUNTER — ANESTHESIA EVENT (INPATIENT)
Dept: LABOR AND DELIVERY | Facility: HOSPITAL | Age: 29
End: 2024-06-06
Payer: COMMERCIAL

## 2024-06-06 ENCOUNTER — ROUTINE PRENATAL (OUTPATIENT)
Dept: OBGYN CLINIC | Facility: CLINIC | Age: 29
End: 2024-06-06
Payer: COMMERCIAL

## 2024-06-06 VITALS
WEIGHT: 237.8 LBS | HEART RATE: 93 BPM | BODY MASS INDEX: 39.62 KG/M2 | OXYGEN SATURATION: 99 % | SYSTOLIC BLOOD PRESSURE: 132 MMHG | HEIGHT: 65 IN | DIASTOLIC BLOOD PRESSURE: 72 MMHG

## 2024-06-06 DIAGNOSIS — O99.513 ASTHMA AFFECTING PREGNANCY IN THIRD TRIMESTER: ICD-10-CM

## 2024-06-06 DIAGNOSIS — Z30.09 STERILIZATION EDUCATION: ICD-10-CM

## 2024-06-06 DIAGNOSIS — O09.93 SUPERVISION OF HIGH RISK PREGNANCY IN THIRD TRIMESTER: ICD-10-CM

## 2024-06-06 DIAGNOSIS — Z87.59 HISTORY OF GESTATIONAL HYPERTENSION: ICD-10-CM

## 2024-06-06 DIAGNOSIS — J45.909 ASTHMA AFFECTING PREGNANCY IN THIRD TRIMESTER: ICD-10-CM

## 2024-06-06 DIAGNOSIS — Z3A.36 36 WEEKS GESTATION OF PREGNANCY: ICD-10-CM

## 2024-06-06 DIAGNOSIS — O14.00 ANTEPARTUM MILD PREECLAMPSIA: Primary | ICD-10-CM

## 2024-06-06 PROCEDURE — 59025 FETAL NON-STRESS TEST: CPT | Performed by: OBSTETRICS & GYNECOLOGY

## 2024-06-06 PROCEDURE — PNV: Performed by: OBSTETRICS & GYNECOLOGY

## 2024-06-06 NOTE — PROGRESS NOTES
"    S: 29 y.o.  36w3d here for routine prenatal visit.        Chief Complaint   Patient presents with    Routine Prenatal Visit     NST         OB complaints:  Contractions: irregular   Leakage: no  Bleeding: no  Fetal movement: yes    Now desires permanent sterilization - surgical consent signed this week  Preop labs ordered, needs to repeat CMP with them - she will go to [facility] on Friday   She was given chlorhexidine wash and instructions earlier this week       O:  /72 (BP Location: Right arm, Patient Position: Sitting, Cuff Size: Large)   Pulse 93   Ht 5' 5\" (1.651 m)   Wt 108 kg (237 lb 12.8 oz)   SpO2 99%   BMI 39.57 kg/m²       Review of Systems   Constitutional:  Negative for chills and fever.   Eyes:  Negative for visual disturbance.   Respiratory:  Negative for chest tightness and shortness of breath.    Cardiovascular:  Negative for chest pain.   Gastrointestinal:  Negative for abdominal pain, diarrhea, nausea and vomiting.   Genitourinary:  Negative for pelvic pain and vaginal bleeding.        As noted in HPI   Skin:  Negative for rash.   Neurological:  Negative for headaches.   All other systems reviewed and are negative.        Physical Exam  Constitutional:       General: She is not in acute distress.     Appearance: Normal appearance.   HENT:      Head: Normocephalic and atraumatic.   Cardiovascular:      Rate and Rhythm: Normal rate.   Pulmonary:      Effort: Pulmonary effort is normal. No respiratory distress.   Abdominal:      General: There is no distension.      Palpations: Abdomen is soft.      Tenderness: There is no abdominal tenderness. There is no guarding or rebound.      Comments: gravid   Neurological:      General: No focal deficit present.      Mental Status: She is alert.   Psychiatric:         Mood and Affect: Mood normal.         Behavior: Behavior normal.   Vitals and nursing note reviewed.              Fetal Heart Rate: NST    Pregravid Weight/BMI: 80.3 kg (177 " lb) (BMI 29.45)  Current Weight: 108 kg (237 lb 12.8 oz)   Total Weight Gain: 27.6 kg (60 lb 12.8 oz)     Pre- Vitals      Flowsheet Row Most Recent Value   Prenatal Assessment    Fetal Heart Rate NST   Movement Present   Prenatal Vitals    Blood Pressure 132/72   Weight - Scale 108 kg (237 lb 12.8 oz)   Urine Albumin/Glucose    Dilation/Effacement/Station    Vaginal Drainage    Edema             NST Completed today:  Baseline: 130 BPM  Variability: Moderate  Accelerations: Yes  Decelerations: None  Contractions: Not present  Nonstress Test Interpretation: Reactive            Problem List       Asthma    Congenital bilateral hip instability    Overview     Patient had an elective primary  due to congenital hip issues since birth with first pregnancy and is planning on a repeat .  This is not a common indication for avoiding a vaginal delivery and she was given the opportunity to consider a  which she declined         36 weeks gestation of pregnancy    History of gestational hypertension    Overview     Dx at 31 wks. Weighed 70 lbs more at that point. Had medical weight loss          Supervision of high risk pregnancy in third trimester    Overview     Flu vaccine: completed   COVID vaccine: completed  NIPT low risk. AFP collected too late   Consents signed  S/p tdap          History of  delivery, antepartum    Overview     H/o PPH requiring medications  Plans repeat C/S          Asthma affecting pregnancy in third trimester    Family history of von Willebrand disease    Overview     Her sister has a mild case and does not require treatment for surgery. Von Willebrand's activity and antigen ordered patient reports she does have heavy periods and they returned as normal however these levels will increase significantly in pregnancy and may be falsely normal.  If she has significant bleeding either during pregnancy or after pregnancy these levels will need to be repeated at least 12  weeks outside of pregnancy.         Family history of thrombosis in first degree relative    Overview     She reports that her father has had multiple DVT prior to age 50 and many in association with surgery.  She will have her father signed a consent to see if he is a carrier for thrombophilia.  If he has not been screened then recommend screening her for inherited thrombophilia including Antithrombin III, protein C and protein S activity levels and prothrombin gene and factor V Leiden - results normal for pregnancy          Encounter for care and examination of lactating mother    Antepartum mild preeclampsia    Overview     Multiple mildly elevated BP's at home and at 34 weeks, 35 week office visits   UPC at 33 weeks 0.31  - twice weekly surveillance  - weekly labs  - delivery at 37 weeks          Current Assessment & Plan     Reports BP's normal this week at home, 110's-120's/70's-80's  NST today reactive/reassuring  No signs/sx severe preeclampsia at this time  C/S and BS scheduled on Monday - preop instructions and labwork reviewed          Sterilization education    Current Assessment & Plan     Reviewed procedure is permanent/irreversible - she states she is sure in this decision                               Kaila Porter MD  6/6/2024  11:56 AM

## 2024-06-06 NOTE — ASSESSMENT & PLAN NOTE
Reports BP's normal this week at home, 110's-120's/70's-80's  NST today reactive/reassuring  No signs/sx severe preeclampsia at this time  C/S and BS scheduled on Monday - preop instructions and labwork reviewed

## 2024-06-06 NOTE — PATIENT INSTRUCTIONS
Admission Instructions for [facility]     Arriving at [facility] the day of your scheduled   Arrive 2 hours before your scheduled surgery time  Report to Admissions. You will be directed to the [facility] from there. For those reporting to the hospital before 6am, you may need to enter through the ER entrance.   Dietary Restrictions (It is vitally important that you follow these instructions exactly)  Scheduled for morning surgery; DO NOT eat or drink anything after midnight, not even WATER! You may have a light dinner early in the evening up to 9pm and then liquids until midnight.   Scheduled for afternoon surgery; Clear liquids between midnight and 8am. DO NOT EAT ANYTHING AFTER MIDNIGHT. (Clear liquids consist of Sprite, Ginger Ale, Tea, Black Coffee, Jello, Broth, Apple Juice… You may use sugar. NO milk products or Orange Juice. No chewing gum or candy.   Failure to follow these instructions could result in a delay or cancelation of your surgery.   Do not smoke or drink alcohol for 24 hours before surgery  Your Obstetrician will tell you what medicines to take or not take on the day of your surgery.                  Preparation     When you arrive on the Labor floor, a member of our staff will prepare you for the birth of your baby.   Preparations may include:  Completing of admission information  Taking your vital signs  Listening to your baby's heartbeat  Clipping the hair on your lower abdomen and upper pubic area  Inserting an intravenous (IV) line  Drawing blood for lab work  A visit from your anesthesiologist  Performing an ultrasound to check your baby's position  Antibiotics may be given an hour before surgery or right after it starts. Antibiotics help fight or prevent a bacterial infection. You may need tests for certain infections that can be passed to your baby, such as group B strep (GBS). If you are a GBS carrier or are at increased risk,  antibiotics help prevent your baby from being infected during the .              In The Operating Room     You will walk back to the OR with your nurse and support person. Your support person will wait outside of the OR for now. The OR team will have you sit on the operating table and position you for your spinal anesthesia. After your spinal has been successfully administered, you will lie down on the table on your back with the assistance of the OR team.      You will be covered with surgical drapes and will have a heart rate and blood pressure monitor connected to you. There will be several nurses and doctors in the room to care for you and your baby. The room is usually cool; you may hear sounds such as heart monitors and suction. There will be a warm bed prepared for your baby. Your support person will be brought into the room once the drapes are in place and your doctor is ready to start your surgery. If you are required to have general anesthesia, your support person will not be able to be in the OR.               Recovery     You will spend about two hours after the surgery recovering in your postpartum room. The nurse caring for you will check your vital signs and incision often during this time. The nurse will communicate with your anesthesiologist about your need for pain medication. This is the room where you will stay for the remainder of your time in the hospital.   After your baby is born, your support person can accompany your baby and your baby's nurse to the  nursery. After you are finished in the OR and brought to your postpartum room, your support person can rejoin you there.      After the birth, as long as your baby is healthy, you and your support person can see and touch the baby in the OR before he/she is taken to the nursery. When you are in your postpartum room and you are feeling well enough, you may hold your baby. You may also do Skin to Skin contact time and breastfeed  if that is what you desire. We encourage you to breastfeed as soon as you feel comfortable, as your baby is usually awake and interested in feeding at this time. Your nurse will help you get started.   Do not get out of bed until your healthcare provider says it is okay.    Call for a healthcare provider if you are holding your baby and start to feel tired. The provider can put him or her in a bassinet near you while you rest or sleep. This will help prevent an accidental drop or fall of your baby.

## 2024-06-09 ENCOUNTER — APPOINTMENT (OUTPATIENT)
Dept: LAB | Facility: HOSPITAL | Age: 29
End: 2024-06-09
Payer: COMMERCIAL

## 2024-06-09 DIAGNOSIS — Z3A.36 36 WEEKS GESTATION OF PREGNANCY: ICD-10-CM

## 2024-06-09 DIAGNOSIS — O34.219 HISTORY OF CESAREAN DELIVERY, CURRENTLY PREGNANT: ICD-10-CM

## 2024-06-09 PROBLEM — Z87.59 HISTORY OF POSTPARTUM HEMORRHAGE: Status: ACTIVE | Noted: 2024-06-09

## 2024-06-09 LAB
ABO GROUP BLD: NORMAL
BLD GP AB SCN SERPL QL: NEGATIVE
ERYTHROCYTE [DISTWIDTH] IN BLOOD BY AUTOMATED COUNT: 13.6 % (ref 11.6–15.1)
HCT VFR BLD AUTO: 35.2 % (ref 34.8–46.1)
HGB BLD-MCNC: 11.8 G/DL (ref 11.5–15.4)
MCH RBC QN AUTO: 31.6 PG (ref 26.8–34.3)
MCHC RBC AUTO-ENTMCNC: 33.5 G/DL (ref 31.4–37.4)
MCV RBC AUTO: 94 FL (ref 82–98)
PLATELET # BLD AUTO: 171 THOUSANDS/UL (ref 149–390)
PMV BLD AUTO: 9.9 FL (ref 8.9–12.7)
RBC # BLD AUTO: 3.73 MILLION/UL (ref 3.81–5.12)
RH BLD: POSITIVE
SPECIMEN EXPIRATION DATE: NORMAL
WBC # BLD AUTO: 8.23 THOUSAND/UL (ref 4.31–10.16)

## 2024-06-09 PROCEDURE — 86850 RBC ANTIBODY SCREEN: CPT

## 2024-06-09 PROCEDURE — 36415 COLL VENOUS BLD VENIPUNCTURE: CPT

## 2024-06-09 PROCEDURE — 86900 BLOOD TYPING SEROLOGIC ABO: CPT

## 2024-06-09 PROCEDURE — 86780 TREPONEMA PALLIDUM: CPT

## 2024-06-09 PROCEDURE — 86901 BLOOD TYPING SEROLOGIC RH(D): CPT

## 2024-06-09 PROCEDURE — 85027 COMPLETE CBC AUTOMATED: CPT

## 2024-06-09 NOTE — H&P
H & P- Obstetrics   Delphine Carney 29 y.o. female MRN: [id number]  Unit/Bed#: LD TRIAGE  Encounter: [id number]      Assessment/Plan:    Delphine is a 29 y.o.  at 37w0d admitted for scheduled RLTCS and BS.    SVE:      History of postpartum hemorrhage  Assessment & Plan  Follow up admission CBC  Consider T&C 2U    Sterilization education  Assessment & Plan  Desires BS with time of RLTCS    Antepartum mild preeclampsia  Assessment & Plan  Follow up admission CBC and CMP  Monitor blood pressures closely postpartum    History of  delivery, antepartum  Assessment & Plan  Desires RLTCS for delivery    36 weeks gestation of pregnancy  Assessment & Plan  Admit to OBGYN  CBC, RPR, Blood Type & Screen  3.  Anesthesia consult for spinal  4.  Ancef 2 gm for ppx  5.  NPO   6.  To OR for  delivery     Asthma  Assessment & Plan  Continue albuterol PRN  Avoid hemabate         Patient of: [facility] (Dr. Cantu & German)  This patient will be an INPATIENT  and I certify the anticipated length of stay is >2 Midnights  Discussed with Dr. Porter      SUBJECTIVE:    Chief Complaint: I am here for my C/S    HPI: Delphine Carney is a 29 y.o.  with an JOHANA of 2024, by Ultrasound at 36w6d who is being admitted for RLTCS in the setting of preeclampsia without severe features. She denies having uterine contractions, has no LOF, and reports no VB. She states she has felt good FM.. This pregnancy is complicated by PreE without severe features, asthma, history of postpartum hemorrhage. All other review of systems is negative.       Pregnancy Plan:  Pregnancy: Dickerson  Fetal sex: Male  Support person: Nacho Smithmarco     Delivery Plans  Planned delivery method:   Planned delivery location: [facility]  Acceptable blood products: All     Post-Delivery Plans  Feeding intentions: Breast Milk  Planned birth control: None      Patient Active Problem List   Diagnosis    Asthma    Congenital  bilateral hip instability    36 weeks gestation of pregnancy    History of gestational hypertension    Supervision of high risk pregnancy in third trimester    History of  delivery, antepartum    Asthma affecting pregnancy in third trimester    Family history of von Willebrand disease    Family history of thrombosis in first degree relative    Encounter for care and examination of lactating mother    Antepartum mild preeclampsia    Sterilization education    History of postpartum hemorrhage       OB History    Para Term  AB Living   2 1 1     1   SAB IAB Ectopic Multiple Live Births           1      # Outcome Date GA Lbr Lee/2nd Weight Sex Type Anes PTL Lv   2 Current            1 Term 20 38w2d  3390 g (7 lb 7.6 oz) M CS-LTranv Spinal  MORGAN       Past Medical History:   Diagnosis Date    Asthma     Hip instability        Past Surgical History:   Procedure Laterality Date     SECTION         Social History     Tobacco Use    Smoking status: Never    Smokeless tobacco: Never   Substance Use Topics    Alcohol use: Not Currently       Allergies   Allergen Reactions    Nuts - Food Allergy Throat Swelling     Almonds, hazelnuts and peanuts    Other Itching     Seasonal pollin allergies    Soybean-Containing Drug Products - Food Allergy GI Intolerance    Apple - Food Allergy     Shellfish Allergy - Food Allergy     Short Ragweed Pollen Ext     Tree Extract     Uncaria Tomentosa (Cats Claw) Allergic Rhinitis         Medications Prior to Admission:     albuterol (PROVENTIL HFA,VENTOLIN HFA) 90 mcg/act inhaler    aspirin (ECOTRIN LOW STRENGTH) 81 mg EC tablet    Blood Pressure Monitoring (Blood Pressure Cuff) MISC    cetirizine (ZyrTEC) 10 mg tablet    EPINEPHrine (EPIPEN) 0.3 mg/0.3 mL SOAJ    fluticasone (FLONASE) 50 mcg/act nasal spray    metoclopramide (Reglan) 10 mg tablet    Prenatal Vit-Fe Fumarate-FA (PRENATAL 1 PLUS 1 PO)    Probiotic Product (Probiotic Daily)  CAPS        OBJECTIVE:  Vitals:  /77      Physical Exam:  General: Well appearing, no distress  Respiratory: Unlabored breathing, lungs clear to auscultation bilaterally  Cardiovascular: Regular rate and rhythm  Abdomen: Soft, gravid, nontender  Fundal Height: Appropriate for gestational age.  Extremities: Warm and well perfused.  Non tender.  Psychiatric: Behavioral normal        FHT:  Baseline 140, moderate variability, 15x15 accelerations present, absent decelerations    TOCO:   Contractions absent      Prenatal Labs: I have personally reviewed pertinent reports.  Blood Type:   Lab Results   Component Value Date/Time    ABO Grouping A 06/09/2024 11:00 AM   D (Rh type):   Lab Results   Component Value Date/Time    Rh Factor Positive 06/09/2024 11:00 AM   Antibody Screen: Negative   HCT/HGB:   Lab Results   Component Value Date/Time    Hematocrit 35.2 06/09/2024 11:00 AM    Hemoglobin 11.8 06/09/2024 11:00 AM   MCV:   Lab Results   Component Value Date/Time    MCV 94 06/09/2024 11:00 AM   Platelets:   Lab Results   Component Value Date/Time    Platelets 171 06/09/2024 11:00 AM   1 hour Glucola:   Lab Results   Component Value Date/Time    Glucose 106 03/14/2024 12:34 PM   Rubella: immune  VDRL/RPR: non reactive  Hep B:   Lab Results   Component Value Date/Time    Hepatitis B Surface Ag non-reactive 12/15/2023 12:00 AM    Hepatitis B Surface Ag Non-reactive 05/15/2020 12:52 PM   Hep C: Nonreactive  HIV:   Lab Results   Component Value Date/Time    HIV-1/HIV-2 Ab Non-Reactive 05/15/2020 12:52 PM    HIV-1/HIV-2 AB Non-Reactive 12/15/2023 12:00 AM   Chlamydia:   Lab Results   Component Value Date/Time    External Chlamydia Screen negative 11/27/2023 12:00 AM    Gonorrhea: Negative  Group B Strep:    Lab Results   Component Value Date/Time    Strep Grp B PCR Negative 06/03/2024 04:19 PM            Kendy Araya MD  6/10/2024  11:36 AM        Portions of the record may have been created with voice  "recognition software.  Occasional wrong word or \"sound a like\" substitutions may have occurred due to the inherent limitations of voice recognition software.  Read the chart carefully and recognize, using context, where substitutions have occurred    "

## 2024-06-09 NOTE — ASSESSMENT & PLAN NOTE
QBL: 78ml, pre op Hgb 12.7 --> post op Hgb 10.3  Voiding: spontaneously  DVT ppx: SCD's and Lovenox QD  Pain: well-controlled w/oral analgesics Ibuprofen/Tylenol/Hilary  FEN: Tolerating regular diet  Routine postpartum care

## 2024-06-09 NOTE — ASSESSMENT & PLAN NOTE
CBC and CMP WNL  Continue to monitor Bps  Continue Procardia 30 mg XL daily    Systolic (24hrs), Av , Min:129 , Max:155   Diastolic (24hrs), Av, Min:69, Max:90

## 2024-06-10 ENCOUNTER — ANESTHESIA (INPATIENT)
Dept: LABOR AND DELIVERY | Facility: HOSPITAL | Age: 29
End: 2024-06-10
Payer: COMMERCIAL

## 2024-06-10 ENCOUNTER — HOSPITAL ENCOUNTER (INPATIENT)
Facility: HOSPITAL | Age: 29
LOS: 4 days | Discharge: HOME | End: 2024-06-14
Attending: OBSTETRICS & GYNECOLOGY | Admitting: OBSTETRICS & GYNECOLOGY
Payer: COMMERCIAL

## 2024-06-10 DIAGNOSIS — Z98.891 S/P REPEAT LOW TRANSVERSE C-SECTION: Primary | ICD-10-CM

## 2024-06-10 DIAGNOSIS — O34.219 HISTORY OF CESAREAN DELIVERY, ANTEPARTUM: ICD-10-CM

## 2024-06-10 PROBLEM — E66.9 OBESITY (BMI 35.0-39.9 WITHOUT COMORBIDITY): Status: ACTIVE | Noted: 2024-06-10

## 2024-06-10 PROBLEM — O14.90 PREECLAMPSIA: Status: ACTIVE | Noted: 2024-05-10

## 2024-06-10 LAB
ABO GROUP BLD: NORMAL
ALBUMIN SERPL BCP-MCNC: 3.8 G/DL (ref 3.5–5)
ALP SERPL-CCNC: 100 U/L (ref 34–104)
ALT SERPL W P-5'-P-CCNC: 8 U/L (ref 7–52)
ANION GAP SERPL CALCULATED.3IONS-SCNC: 7 MMOL/L (ref 4–13)
AST SERPL W P-5'-P-CCNC: 12 U/L (ref 13–39)
BASE EXCESS BLDCOA CALC-SCNC: -1.8 MMOL/L (ref 3–11)
BASE EXCESS BLDCOV CALC-SCNC: -1.6 MMOL/L (ref 1–9)
BILIRUB SERPL-MCNC: 0.31 MG/DL (ref 0.2–1)
BLD GP AB SCN SERPL QL: NEGATIVE
BUN SERPL-MCNC: 6 MG/DL (ref 5–25)
CALCIUM SERPL-MCNC: 9.2 MG/DL (ref 8.4–10.2)
CHLORIDE SERPL-SCNC: 106 MMOL/L (ref 96–108)
CO2 SERPL-SCNC: 22 MMOL/L (ref 21–32)
CREAT SERPL-MCNC: 0.49 MG/DL (ref 0.6–1.3)
ERYTHROCYTE [DISTWIDTH] IN BLOOD BY AUTOMATED COUNT: 13.4 % (ref 11.6–15.1)
GFR SERPL CREATININE-BSD FRML MDRD: 133 ML/MIN/1.73SQ M
GLUCOSE SERPL-MCNC: 78 MG/DL (ref 65–140)
HCO3 BLDCOA-SCNC: 25.9 MMOL/L (ref 17.3–27.3)
HCO3 BLDCOV-SCNC: 25.4 MMOL/L (ref 12.2–28.6)
HCT VFR BLD AUTO: 37.1 % (ref 34.8–46.1)
HGB BLD-MCNC: 12.7 G/DL (ref 11.5–15.4)
MCH RBC QN AUTO: 31.3 PG (ref 26.8–34.3)
MCHC RBC AUTO-ENTMCNC: 34.2 G/DL (ref 31.4–37.4)
MCV RBC AUTO: 91 FL (ref 82–98)
O2 CT VFR BLDCOA CALC: 5.7 ML/DL
OXYHGB MFR BLDCOA: 22.8 %
OXYHGB MFR BLDCOV: 24.8 %
PCO2 BLDCOA: 54.3 MM[HG] (ref 30–60)
PCO2 BLDCOV: 50.4 MM HG (ref 27–43)
PH BLDCOA: 7.3 [PH] (ref 7.23–7.43)
PH BLDCOV: 7.32 [PH] (ref 7.19–7.49)
PLATELET # BLD AUTO: 174 THOUSANDS/UL (ref 149–390)
PMV BLD AUTO: 9.9 FL (ref 8.9–12.7)
PO2 BLDCOA: 13.8 MM HG (ref 5–25)
PO2 BLDCOV: 13.8 MM HG (ref 15–45)
POTASSIUM SERPL-SCNC: 3.9 MMOL/L (ref 3.5–5.3)
PROT SERPL-MCNC: 7.6 G/DL (ref 6.4–8.4)
RBC # BLD AUTO: 4.06 MILLION/UL (ref 3.81–5.12)
RH BLD: POSITIVE
SAO2 % BLDCOV: 6.2 ML/DL
SODIUM SERPL-SCNC: 135 MMOL/L (ref 135–147)
SPECIMEN EXPIRATION DATE: NORMAL
TREPONEMA PALLIDUM IGG+IGM AB [PRESENCE] IN SERUM OR PLASMA BY IMMUNOASSAY: NORMAL
TREPONEMA PALLIDUM IGG+IGM AB [PRESENCE] IN SERUM OR PLASMA BY IMMUNOASSAY: NORMAL
WBC # BLD AUTO: 8.81 THOUSAND/UL (ref 4.31–10.16)

## 2024-06-10 PROCEDURE — 58611 LIGATE OVIDUCT(S) ADD-ON: CPT | Performed by: OBSTETRICS & GYNECOLOGY

## 2024-06-10 PROCEDURE — 80053 COMPREHEN METABOLIC PANEL: CPT

## 2024-06-10 PROCEDURE — 86850 RBC ANTIBODY SCREEN: CPT

## 2024-06-10 PROCEDURE — 86923 COMPATIBILITY TEST ELECTRIC: CPT

## 2024-06-10 PROCEDURE — 86780 TREPONEMA PALLIDUM: CPT

## 2024-06-10 PROCEDURE — 59510 CESAREAN DELIVERY: CPT | Performed by: OBSTETRICS & GYNECOLOGY

## 2024-06-10 PROCEDURE — 85027 COMPLETE CBC AUTOMATED: CPT

## 2024-06-10 PROCEDURE — 82805 BLOOD GASES W/O2 SATURATION: CPT | Performed by: OBSTETRICS & GYNECOLOGY

## 2024-06-10 PROCEDURE — NC001 PR NO CHARGE: Performed by: OBSTETRICS & GYNECOLOGY

## 2024-06-10 PROCEDURE — 88307 TISSUE EXAM BY PATHOLOGIST: CPT | Performed by: PATHOLOGY

## 2024-06-10 PROCEDURE — 88302 TISSUE EXAM BY PATHOLOGIST: CPT | Performed by: PATHOLOGY

## 2024-06-10 PROCEDURE — 4A1HXCZ MONITORING OF PRODUCTS OF CONCEPTION, CARDIAC RATE, EXTERNAL APPROACH: ICD-10-PCS | Performed by: OBSTETRICS & GYNECOLOGY

## 2024-06-10 PROCEDURE — 0UT70ZZ RESECTION OF BILATERAL FALLOPIAN TUBES, OPEN APPROACH: ICD-10-PCS | Performed by: OBSTETRICS & GYNECOLOGY

## 2024-06-10 PROCEDURE — 86901 BLOOD TYPING SEROLOGIC RH(D): CPT

## 2024-06-10 PROCEDURE — 86900 BLOOD TYPING SEROLOGIC ABO: CPT

## 2024-06-10 RX ORDER — ACETAMINOPHEN 325 MG/1
975 TABLET ORAL EVERY 6 HOURS PRN
Status: DISPENSED | OUTPATIENT
Start: 2024-06-10 | End: 2024-06-11

## 2024-06-10 RX ORDER — SIMETHICONE 80 MG
80 TABLET,CHEWABLE ORAL 4 TIMES DAILY PRN
Status: DISCONTINUED | OUTPATIENT
Start: 2024-06-10 | End: 2024-06-14 | Stop reason: HOSPADM

## 2024-06-10 RX ORDER — PHENYLEPHRINE HYDROCHLORIDE 10 MG/ML
INJECTION INTRAVENOUS
Status: DISPENSED
Start: 2024-06-10 | End: 2024-06-11

## 2024-06-10 RX ORDER — ENOXAPARIN SODIUM 100 MG/ML
40 INJECTION SUBCUTANEOUS DAILY
Status: DISCONTINUED | OUTPATIENT
Start: 2024-06-11 | End: 2024-06-14 | Stop reason: HOSPADM

## 2024-06-10 RX ORDER — KETOROLAC TROMETHAMINE 30 MG/ML
INJECTION, SOLUTION INTRAMUSCULAR; INTRAVENOUS
Status: COMPLETED
Start: 2024-06-10 | End: 2024-06-10

## 2024-06-10 RX ORDER — FENTANYL CITRATE 50 UG/ML
INJECTION, SOLUTION INTRAMUSCULAR; INTRAVENOUS
Status: COMPLETED
Start: 2024-06-10 | End: 2024-06-10

## 2024-06-10 RX ORDER — OXYCODONE HYDROCHLORIDE 5 MG/1
5 TABLET ORAL EVERY 4 HOURS PRN
Status: DISCONTINUED | OUTPATIENT
Start: 2024-06-11 | End: 2024-06-14 | Stop reason: HOSPADM

## 2024-06-10 RX ORDER — ONDANSETRON 2 MG/ML
4 INJECTION INTRAMUSCULAR; INTRAVENOUS EVERY 8 HOURS PRN
Status: DISCONTINUED | OUTPATIENT
Start: 2024-06-10 | End: 2024-06-10

## 2024-06-10 RX ORDER — ONDANSETRON 2 MG/ML
4 INJECTION INTRAMUSCULAR; INTRAVENOUS EVERY 8 HOURS PRN
Status: DISCONTINUED | OUTPATIENT
Start: 2024-06-11 | End: 2024-06-14 | Stop reason: HOSPADM

## 2024-06-10 RX ORDER — METOCLOPRAMIDE HYDROCHLORIDE 5 MG/ML
5 INJECTION INTRAMUSCULAR; INTRAVENOUS EVERY 6 HOURS PRN
Status: ACTIVE | OUTPATIENT
Start: 2024-06-10 | End: 2024-06-11

## 2024-06-10 RX ORDER — NALOXONE HYDROCHLORIDE 0.4 MG/ML
0.1 INJECTION, SOLUTION INTRAMUSCULAR; INTRAVENOUS; SUBCUTANEOUS
Status: ACTIVE | OUTPATIENT
Start: 2024-06-10 | End: 2024-06-11

## 2024-06-10 RX ORDER — OXYTOCIN/RINGER'S LACTATE 30/500 ML
62.5 PLASTIC BAG, INJECTION (ML) INTRAVENOUS ONCE
Status: COMPLETED | OUTPATIENT
Start: 2024-06-10 | End: 2024-06-10

## 2024-06-10 RX ORDER — SODIUM CHLORIDE, SODIUM LACTATE, POTASSIUM CHLORIDE, CALCIUM CHLORIDE 600; 310; 30; 20 MG/100ML; MG/100ML; MG/100ML; MG/100ML
125 INJECTION, SOLUTION INTRAVENOUS CONTINUOUS
Status: DISCONTINUED | OUTPATIENT
Start: 2024-06-10 | End: 2024-06-10

## 2024-06-10 RX ORDER — NALBUPHINE HYDROCHLORIDE 10 MG/ML
3 INJECTION, SOLUTION INTRAMUSCULAR; INTRAVENOUS; SUBCUTANEOUS
Status: DISPENSED | OUTPATIENT
Start: 2024-06-10 | End: 2024-06-11

## 2024-06-10 RX ORDER — ACETAMINOPHEN 325 MG/1
650 TABLET ORAL EVERY 8 HOURS SCHEDULED
Status: DISCONTINUED | OUTPATIENT
Start: 2024-06-11 | End: 2024-06-13

## 2024-06-10 RX ORDER — BUPIVACAINE HYDROCHLORIDE 7.5 MG/ML
INJECTION, SOLUTION INTRASPINAL AS NEEDED
Status: DISCONTINUED | OUTPATIENT
Start: 2024-06-10 | End: 2024-06-10

## 2024-06-10 RX ORDER — OXYTOCIN/RINGER'S LACTATE 30/500 ML
PLASTIC BAG, INJECTION (ML) INTRAVENOUS
Status: COMPLETED
Start: 2024-06-10 | End: 2024-06-10

## 2024-06-10 RX ORDER — OXYCODONE HYDROCHLORIDE 5 MG/1
10 TABLET ORAL EVERY 4 HOURS PRN
Status: DISCONTINUED | OUTPATIENT
Start: 2024-06-11 | End: 2024-06-14 | Stop reason: HOSPADM

## 2024-06-10 RX ORDER — DIPHENHYDRAMINE HYDROCHLORIDE 50 MG/ML
25 INJECTION INTRAMUSCULAR; INTRAVENOUS EVERY 6 HOURS PRN
Status: DISCONTINUED | OUTPATIENT
Start: 2024-06-11 | End: 2024-06-14 | Stop reason: HOSPADM

## 2024-06-10 RX ORDER — ONDANSETRON 2 MG/ML
4 INJECTION INTRAMUSCULAR; INTRAVENOUS ONCE AS NEEDED
Status: DISCONTINUED | OUTPATIENT
Start: 2024-06-10 | End: 2024-06-12

## 2024-06-10 RX ORDER — CEFAZOLIN SODIUM 2 G/50ML
SOLUTION INTRAVENOUS AS NEEDED
Status: DISCONTINUED | OUTPATIENT
Start: 2024-06-10 | End: 2024-06-10

## 2024-06-10 RX ORDER — FENTANYL CITRATE/PF 50 MCG/ML
50 SYRINGE (ML) INJECTION
Status: DISCONTINUED | OUTPATIENT
Start: 2024-06-10 | End: 2024-06-12

## 2024-06-10 RX ORDER — DOCUSATE SODIUM 100 MG/1
100 CAPSULE, LIQUID FILLED ORAL 2 TIMES DAILY
Status: DISCONTINUED | OUTPATIENT
Start: 2024-06-10 | End: 2024-06-14 | Stop reason: HOSPADM

## 2024-06-10 RX ORDER — KETOROLAC TROMETHAMINE 30 MG/ML
30 INJECTION, SOLUTION INTRAMUSCULAR; INTRAVENOUS EVERY 6 HOURS PRN
Status: DISPENSED | OUTPATIENT
Start: 2024-06-10 | End: 2024-06-11

## 2024-06-10 RX ORDER — SODIUM CHLORIDE 9 MG/ML
125 INJECTION, SOLUTION INTRAVENOUS CONTINUOUS
Status: DISCONTINUED | OUTPATIENT
Start: 2024-06-10 | End: 2024-06-10

## 2024-06-10 RX ORDER — OXYTOCIN/RINGER'S LACTATE 30/500 ML
PLASTIC BAG, INJECTION (ML) INTRAVENOUS CONTINUOUS PRN
Status: DISCONTINUED | OUTPATIENT
Start: 2024-06-10 | End: 2024-06-10

## 2024-06-10 RX ORDER — DIPHENHYDRAMINE HYDROCHLORIDE 50 MG/ML
25 INJECTION INTRAMUSCULAR; INTRAVENOUS EVERY 6 HOURS PRN
Status: ACTIVE | OUTPATIENT
Start: 2024-06-10 | End: 2024-06-11

## 2024-06-10 RX ORDER — CALCIUM CARBONATE 500 MG/1
1000 TABLET, CHEWABLE ORAL DAILY PRN
Status: DISCONTINUED | OUTPATIENT
Start: 2024-06-10 | End: 2024-06-14 | Stop reason: HOSPADM

## 2024-06-10 RX ORDER — LORATADINE 10 MG/1
10 TABLET ORAL DAILY
Status: DISCONTINUED | OUTPATIENT
Start: 2024-06-11 | End: 2024-06-14 | Stop reason: HOSPADM

## 2024-06-10 RX ORDER — SENNOSIDES 8.8 MG/5ML
8.8 LIQUID ORAL
Status: DISCONTINUED | OUTPATIENT
Start: 2024-06-10 | End: 2024-06-10 | Stop reason: SDUPTHER

## 2024-06-10 RX ORDER — ONDANSETRON 2 MG/ML
INJECTION INTRAMUSCULAR; INTRAVENOUS AS NEEDED
Status: DISCONTINUED | OUTPATIENT
Start: 2024-06-10 | End: 2024-06-10

## 2024-06-10 RX ORDER — KETOROLAC TROMETHAMINE 30 MG/ML
INJECTION, SOLUTION INTRAMUSCULAR; INTRAVENOUS AS NEEDED
Status: DISCONTINUED | OUTPATIENT
Start: 2024-06-10 | End: 2024-06-10

## 2024-06-10 RX ORDER — CEFAZOLIN SODIUM 2 G/50ML
2000 SOLUTION INTRAVENOUS ONCE
Status: DISCONTINUED | OUTPATIENT
Start: 2024-06-10 | End: 2024-06-10

## 2024-06-10 RX ORDER — HYDROMORPHONE HCL/PF 1 MG/ML
0.5 SYRINGE (ML) INJECTION EVERY 2 HOUR PRN
Status: ACTIVE | OUTPATIENT
Start: 2024-06-10 | End: 2024-06-11

## 2024-06-10 RX ORDER — SODIUM CHLORIDE, SODIUM LACTATE, POTASSIUM CHLORIDE, CALCIUM CHLORIDE 600; 310; 30; 20 MG/100ML; MG/100ML; MG/100ML; MG/100ML
125 INJECTION, SOLUTION INTRAVENOUS CONTINUOUS
Status: DISCONTINUED | OUTPATIENT
Start: 2024-06-10 | End: 2024-06-14 | Stop reason: HOSPADM

## 2024-06-10 RX ORDER — SENNOSIDES 8.6 MG
1 TABLET ORAL
Status: DISCONTINUED | OUTPATIENT
Start: 2024-06-10 | End: 2024-06-14 | Stop reason: HOSPADM

## 2024-06-10 RX ORDER — MORPHINE SULFATE 0.5 MG/ML
INJECTION, SOLUTION EPIDURAL; INTRATHECAL; INTRAVENOUS AS NEEDED
Status: DISCONTINUED | OUTPATIENT
Start: 2024-06-10 | End: 2024-06-10

## 2024-06-10 RX ORDER — IBUPROFEN 600 MG/1
600 TABLET ORAL EVERY 6 HOURS
Status: DISCONTINUED | OUTPATIENT
Start: 2024-06-13 | End: 2024-06-11

## 2024-06-10 RX ORDER — PHENYLEPHRINE HCL IN 0.9% NACL 1 MG/10 ML
SYRINGE (ML) INTRAVENOUS AS NEEDED
Status: DISCONTINUED | OUTPATIENT
Start: 2024-06-10 | End: 2024-06-10

## 2024-06-10 RX ORDER — OXYCODONE HYDROCHLORIDE 5 MG/1
5 TABLET ORAL EVERY 4 HOURS PRN
Status: DISPENSED | OUTPATIENT
Start: 2024-06-10 | End: 2024-06-11

## 2024-06-10 RX ORDER — KETOROLAC TROMETHAMINE 30 MG/ML
30 INJECTION, SOLUTION INTRAMUSCULAR; INTRAVENOUS EVERY 6 HOURS SCHEDULED
Status: DISCONTINUED | OUTPATIENT
Start: 2024-06-11 | End: 2024-06-11

## 2024-06-10 RX ORDER — ONDANSETRON 2 MG/ML
INJECTION INTRAMUSCULAR; INTRAVENOUS
Status: COMPLETED
Start: 2024-06-10 | End: 2024-06-10

## 2024-06-10 RX ORDER — ALBUTEROL SULFATE 90 UG/1
2 AEROSOL, METERED RESPIRATORY (INHALATION) EVERY 6 HOURS PRN
Status: DISCONTINUED | OUTPATIENT
Start: 2024-06-10 | End: 2024-06-14 | Stop reason: HOSPADM

## 2024-06-10 RX ORDER — MORPHINE SULFATE 0.5 MG/ML
INJECTION, SOLUTION EPIDURAL; INTRATHECAL; INTRAVENOUS
Status: COMPLETED
Start: 2024-06-10 | End: 2024-06-10

## 2024-06-10 RX ORDER — BENZOCAINE/MENTHOL 6 MG-10 MG
1 LOZENGE MUCOUS MEMBRANE DAILY PRN
Status: DISCONTINUED | OUTPATIENT
Start: 2024-06-10 | End: 2024-06-14 | Stop reason: HOSPADM

## 2024-06-10 RX ORDER — ONDANSETRON 2 MG/ML
4 INJECTION INTRAMUSCULAR; INTRAVENOUS EVERY 6 HOURS PRN
Status: ACTIVE | OUTPATIENT
Start: 2024-06-10 | End: 2024-06-11

## 2024-06-10 RX ORDER — FENTANYL CITRATE 50 UG/ML
INJECTION, SOLUTION INTRAMUSCULAR; INTRAVENOUS AS NEEDED
Status: DISCONTINUED | OUTPATIENT
Start: 2024-06-10 | End: 2024-06-10

## 2024-06-10 RX ADMIN — Medication 200 MCG: at 13:14

## 2024-06-10 RX ADMIN — SENNOSIDES 8.6 MG: 8.6 TABLET, FILM COATED ORAL at 21:04

## 2024-06-10 RX ADMIN — KETOROLAC TROMETHAMINE 30 MG: 30 INJECTION, SOLUTION INTRAMUSCULAR; INTRAVENOUS at 14:16

## 2024-06-10 RX ADMIN — ONDANSETRON 4 MG: 2 INJECTION INTRAMUSCULAR; INTRAVENOUS at 13:19

## 2024-06-10 RX ADMIN — FENTANYL CITRATE 10 MCG: 50 INJECTION INTRAMUSCULAR; INTRAVENOUS at 13:11

## 2024-06-10 RX ADMIN — MORPHINE SULFATE 0.15 MG: 0.5 INJECTION, SOLUTION EPIDURAL; INTRATHECAL; INTRAVENOUS at 13:11

## 2024-06-10 RX ADMIN — SODIUM CHLORIDE, SODIUM LACTATE, POTASSIUM CHLORIDE, AND CALCIUM CHLORIDE 125 ML/HR: .6; .31; .03; .02 INJECTION, SOLUTION INTRAVENOUS at 11:15

## 2024-06-10 RX ADMIN — Medication 250 MILLI-UNITS/MIN: at 13:37

## 2024-06-10 RX ADMIN — KETOROLAC TROMETHAMINE 30 MG: 30 INJECTION, SOLUTION INTRAMUSCULAR; INTRAVENOUS at 21:04

## 2024-06-10 RX ADMIN — KETOROLAC TROMETHAMINE 30 MG: 30 INJECTION, SOLUTION INTRAMUSCULAR at 21:04

## 2024-06-10 RX ADMIN — SODIUM CHLORIDE, SODIUM LACTATE, POTASSIUM CHLORIDE, AND CALCIUM CHLORIDE: .6; .31; .03; .02 INJECTION, SOLUTION INTRAVENOUS at 13:39

## 2024-06-10 RX ADMIN — Medication 62.5 MILLI-UNITS/MIN: at 15:00

## 2024-06-10 RX ADMIN — SODIUM CHLORIDE, SODIUM LACTATE, POTASSIUM CHLORIDE, AND CALCIUM CHLORIDE 125 ML/HR: .6; .31; .03; .02 INJECTION, SOLUTION INTRAVENOUS at 12:30

## 2024-06-10 RX ADMIN — Medication 100 MCG: at 13:26

## 2024-06-10 RX ADMIN — BUPIVACAINE HYDROCHLORIDE IN DEXTROSE 1.6 ML: 7.5 INJECTION, SOLUTION SUBARACHNOID at 13:11

## 2024-06-10 RX ADMIN — CEFAZOLIN SODIUM 2000 MG: 2 SOLUTION INTRAVENOUS at 13:04

## 2024-06-10 RX ADMIN — DOCUSATE SODIUM 100 MG: 100 CAPSULE, LIQUID FILLED ORAL at 18:08

## 2024-06-10 RX ADMIN — PHENYLEPHRINE HYDROCHLORIDE 25 MCG/MIN: 10 INJECTION INTRAVENOUS at 13:12

## 2024-06-10 RX ADMIN — OXYCODONE 5 MG: 5 TABLET ORAL at 18:08

## 2024-06-10 RX ADMIN — Medication 250 MILLI-UNITS/MIN: at 13:57

## 2024-06-10 NOTE — LACTATION NOTE
This note was copied from a baby's chart.  CONSULT - LACTATION  Baby Boy (Rita Carney 0 days male MRN: [id number]    [facility] NICU Room / Bed: NICU_16/NICU_16 Encounter: [id number]    Maternal Information     MOTHER:  Delphine Carney  Maternal Age: 29 y.o.  OB History: # 1 - Date: 20, Sex: Male, Weight: 3390 g (7 lb 7.6 oz), GA: 38w2d, Type: , Low Transverse, Apgar1: 9, Apgar5: 9, Living: Living, Birth Comments: None    # 2 - Date: None, Sex: None, Weight: None, GA: None, Type: None, Apgar1: None, Apgar5: None, Living: None, Birth Comments: None   Previouse breast reduction surgery? No    Lactation history:   Has patient previously breast fed: Yes   How long had patient previously breast fed: over a year   Previous breast feeding complications: Exclusive pump and bottle fed     Past Surgical History:   Procedure Laterality Date     SECTION         Birth information:  YOB: 2023   Time of birth: 1:37 PM   Sex: male   Delivery type: , Low Transverse   Birth Weight: 3300 g (7 lb 4.4 oz)   Percent of Weight Change: 0%     Gestational Age: 37w0d   [unfilled]    Assessment     Breast and nipple assessment: normal assessment     Assessment:  as per 27 week BOY in NICU    Feeding assessment:  as per NICU  LATCH:  Latch:     Audible Swallowing:     Type of Nipple:     Comfort (Breast/Nipple):     Hold (Positioning):     LATCH Score:            Feeding recommendations:  pump every 2-3 hours as per Mom's plan & 37 week BOY in NICU    Instructions reviewed for hands on pumping for 37 week BOY in NICU.  Discussed when to start, frequency, different pumps available versus manual expression.    Discussed hygiene of hands and supplies as well as assembly, placement of flanges, size of flanged, preparing the breast and cycles and suction settings on pump.    Demonstrated use of hand pump.    Discussed labeling of milk, storage, and  preparation of stored milk.    Also reviewed with experienced mother Ready, Set Baby, NICU and discharge breastfeeding booklets including the feeding log. Emphasized 8 or more (12) feedings in a 24 hour period, what to expect for the number of diapers per day of life and the progression of properties of the  stooling pattern.    List of reasons to call a lactation consultant.  Feeding logs  Feeding cues  Hand expression  Baby's Second day (cluster feeding)  Breastfeeding and Your Lifestyle (Medications, Alcohol, Caffeine, Smoking, Street Drugs, Methadone)  First Two Weeks Survival Guide for Breastfeeding  Breast Changes  Physical Therapy  Storage and Handling of Breast milk  How to Keep Your Breast Pump Kit Clean  The Employed Breastfeeding Mother  Mixed feeding  Bottle feeding like breastfeeding (paced bottle feeding)  astfeeding and your lifestyle, storage and preparation of breast milk, how to keep you breast pump clean, the employed breastfeeding mother and paced bottle feeding handouts.     Booklet included Breastfeeding Resources for after discharge including access to the number for the [facility]. Dad with baby at this time.    [employer] [facility]  to call for assistance, questions and concerns.  Extension number for inpatient lactation support provided.      Mai Crystal RN 6/10/2024 3:56 PM

## 2024-06-10 NOTE — OP NOTE
OPERATIVE REPORT  PATIENT NAME: Delphine Carney    :  1995  MRN: [id number]  Pt Location: [facility] OR ROOM 02    SURGERY DATE: 6/10/2024    Surgeons and Role:     * Kaila Porter MD - Primary     * Kenyd Araya MD - Assisting    Preop Diagnosis:  History of  delivery, desires delivery by RLTCS  Desire for sterilization    * No Diagnosis Codes entered *    Procedure(s) (LRB):   SECTION () REPEAT (N/A)  LIGATION/COAGULATION TUBAL (N/A)    Specimen(s):  ID Type Source Tests Collected by Time Destination   1 : FOR PATHOLOGY Tissue Placenta TISSUE EXAM Kaila Porter MD 6/10/2024 1332    2 : PRN Tissue Fallopian Tube, Right TISSUE EXAM Kaila Porter MD 6/10/2024 1332    3 : PRN Tissue Fallopian Tube, Left TISSUE EXAM Kaila Porter MD 6/10/2024 1332    A :  Cord Blood Cord BLOOD GAS, VENOUS, CORD Kaila Porter MD 6/10/2024 1332    B :  Cord Blood Cord BLOOD GAS, ARTERIAL, CORD Kaila Porter MD 6/10/2024 1332        Surgical QBL:  Surgical QBL (mL): 78 mL      Drains:  Urethral Catheter 16 Fr. (Active)   Number of days: 0       Anesthesia Type:   General     Toribio Group Classification System:  No Multiple pregnancy, No Transverse or oblique lie, No Breech lie, Gestational age is > or =37 weeks, Multiparous, Previous uterine scar +  is TORIBIO GROUP 5    Operative Findings:  Maternal Findings:  Normal uterus  Normal tubes and ovaries bilaterally  No adhesions  No difficulty noted from skin to delivery     Findings:  Viable male weighing 7lbs 4.4oz;  Apgar scores of 8 at one minute and 9 at five minutes.   Clear amniotic fluid  Normal placenta with 3-vessel cord    Arterial and Venous Gases:  Umbilical Cord Venous Blood Gas:  Results from last 7 days   Lab Units 06/10/24  1332   PH COV  7.320   PCO2 COV mm HG 50.4*   HCO3 COV mmol/L 25.4   BASE EXC COV mmol/L -1.6*   O2 CT CD VB mL/dL 6.2   O2 HGB, VENOUS CORD % 24.8     Umbilical Cord  Arterial Blood Gas:  Results from last 7 days   Lab Units 06/10/24  1332   PH COA  7.296   PCO2 COA  54.3   PO2 COA mm HG 13.8   HCO3 COA mmol/L 25.9   BASE EXC COA mmol/L -1.8*   O2 CONTENT CORD ART ml/dl 5.7   O2 HGB, ARTERIAL CORD % 22.8       Specimens: Arterial and venous cord gases, cord blood, segment of umbilical cord, bilateral Fallopian tubes, placenta to storage    Quantitative Blood Loss: 78 mL    Drains: Ling catheter           Complications:  None; patient tolerated the procedure well.           Disposition: PACU            Condition: stable    Procedure Details   The patient was seen prior to the procedure. Risks, benefits, possible complications, alternate treatment options, and expected outcomes were discussed with the patient.  The patient agreed with the proposed plan and gave informed consent for a RLTCS and bilateral salpingectomy.      The patient was taken to the OB Operating Room at 1301 where she received spinal anesthesia. For infection prophylaxis, she received 2g ancef  preoperatively. Fetal heart tones in the OR were assessed and noted to be within normal limits and a Ling catheter and SCDs were placed.  The abdomen was prepped with Chloraprep, the vagina was prepped with Chlorhexidine, and following appropriate drying time, the patient was draped in the usual sterile manner.   A Time Out was held and the above information confirmed.  The patient was identified as Delphine Carney and the procedure verified as a repeat  delivery and bilateral salpingectomy.    A Pfannenstiel incision was made and carried down through the underlying subcutaneous tissue to the fascia using a scalpel. The rectus fascia was then nicked in the midline and dissected laterally using Garza scissors.  The superior edge of the  fascial incision was grasped with Kocher clamps bilaterally, tented upward and the underlying rectus muscles were dissected off sharply with Garza scissors at the midline and bluntly  laterally.  This was repeated on the inferior edge of the fascia and dissected down to the pubic rami.  The rectus muscles were  and the peritoneum was identified, entered sharply with Kristen's and Metzenbaum scissors, and extended longitudinally with blunt dissection. The bladder blade was inserted. A low transverse uterine incision was made with the scalpel and extended laterally with blunt dissection. The amnion was entered bluntly.      The fetal head was palpated, elevated, and delivered through the uterine incision followed by the body without difficulty. Time of birth was noted at 1337. There was noted to be spontaneous cry and good tone. There was no apparent injury to the .The umbilical cord was doubly clamped and cut after 30 seconds to allow for delayed cord clamping.  The infant was handed off to the  providers.  Arterial and venous cord gases, cord blood, and a segment of umbilical cord were obtained for evaluation.  The placenta delivered spontaneously at 1340 with uterine fundal massage and appeared normal. The uterus was exteriorized and cleaned out with a moist lap sponge.     The uterine incision was closed with a running locked suture of 0 Vicryl. A second layer of the same suture was used to imbricate the first.  Hemostasis was noted to be excellent.  Attention was then turned to the bilateral salpingectomy. The right fallopian tube was grasped with two Babcocks and using the Enseal the mesosalpinx was coagulated and cut along the length of the tube. The fallopian tube was amputated at the cornua. This process was repeated on the left side. Hemostasis was excellent. The uterus was returned to the abdomen.  The paracolic gutters were inspected and cleared of all clots and debris with moist lap sponges. The fascia was closed with a running suture of 0 Vicryl. Subcutaneous adipose tissue was closed with a running suture of 2-0 Plain gut.  The skin was closed with a  subcuticular running suture of 4-0 Monocryl. Benzoin and steri-strip dressings were applied.      The patient appeared to tolerate the procedure very well.  Lap sponge, needle, and instrument counts were correct x2.  The patient's fundus was palpated and the uterus was expressed. She was then cleaned and transferred to her postpartum recovery room in stable condition and her infant went to the  nursery.    Attending Attestation: Dr. Kaila Porter MD was present for the entire procedure.  Kendy Araya MD  OB/GYN PGY-1  6/10/2024 2:40 PM

## 2024-06-10 NOTE — DISCHARGE SUMMARY
Discharge Summary - Delphine Carney 29 y.o. female MRN: [id number]    Unit/Bed#: -01 Encounter: [id number]    ADMISSION  Admission Date: 6/10/2024   Admitting Attending: Dr. Kaila Porter MD  Admitting Diagnoses:   Patient Active Problem List   Diagnosis    Asthma    Congenital bilateral hip instability    S/P repeat low transverse     History of gestational hypertension    Supervision of high risk pregnancy in third trimester    History of  delivery, antepartum    Asthma affecting pregnancy in third trimester    Family history of von Willebrand disease    Family history of thrombosis in first degree relative    Encounter for care and examination of lactating mother    Preeclampsia    Sterilization education    History of postpartum hemorrhage    Obesity (BMI 35.0-39.9 without comorbidity)       DELIVERY  Delivery Method: , Low Transverse   Delivery Date and Time: 6/10/2024 1:37 PM  Delivery Attending: Kaila Porter    DISCHARGE  Discharge Date: 24  Discharge Attending:   Discharge Diagnosis:   Same, Delivered    Clinical course: Admission to Delivery  Delphine Carney is a 29 y.o.  who was admitted at 37w0d for scheduled RLTCS in the setting of preeclampsia without severe features. She also desired permanent contraception and was consented for bilateral salpingectomy. Her pregnancy was complicated by asthma.      Delivery  Route of Delivery: , Low Transverse  Reason for  delivery: Prior  1    Anesthesia:Spinal  QBL: 78    Delivery: , Low Transverse at 6/10/2024 1:37 PM    Baby's Weight: 3300 g (7 lb 4.4 oz); 116.4    Apgar scores: 8  and 9  at 1 and 5 minutes, respectively    On day of life 1 her infant was noted to have respiratory distress and was admitted to the NICU with a pneumothorax.    Clinical Course: Post-Delivery:  The post delivery course was unremarkable. She was started on Procardia 30 mg XL daily for blood  pressure control.    On the day of discharge, the patient was ambulating, voiding spontaneously, tolerating oral intake, and hemodynamically stable. She was able to reasonably perform all ADLs. She had appropriate bowel function. Pain was well-controlled. She was discharged home on postpartum/postop day #4 without complications. Patient was instructed to follow up with her OB as an outpatient and was given appropriate warnings to call her provider with problems or concerns.    Pertinent lab findings included:   Blood type A positive.     Last three Hgb values:  Lab Results   Component Value Date    HGB 12.7 06/10/2024    HGB 11.8 2024    HGB 11.8 2024        Problem-specific follow-up plans included the following:  Problem List       Asthma    Current Assessment & Plan     Continue albuterol PRN  Avoid hemabate         Congenital bilateral hip instability    Overview     Patient had an elective primary  due to congenital hip issues since birth with first pregnancy and is planning on a repeat .  This is not a common indication for avoiding a vaginal delivery and she was given the opportunity to consider a  which she declined         * (Principal) S/P repeat low transverse     Current Assessment & Plan     Routine postpartum care  Encourage ambulation  Encourage familial bonding  Lactation support as needed  Pain: Motrin/Tylenol around the clock, oxycodone if needed  Postpartum Contraceptive plan: s/p BS  Pre-delivery Hgb 11.8 --> Post Delivery pending  Ling catheter: in  DVT Ppx: ambulation, SCDs, Lovenox 40mg         History of gestational hypertension    Overview     Dx at 31 wks. Weighed 70 lbs more at that point. Had medical weight loss          Supervision of high risk pregnancy in third trimester    Overview     Flu vaccine: completed   COVID vaccine: completed  NIPT low risk. AFP collected too late   Consents signed  S/p tdap          History of  delivery,  antepartum    Overview     H/o PPH requiring medications  Plans repeat C/S          Asthma affecting pregnancy in third trimester    Family history of von Willebrand disease    Overview     Her sister has a mild case and does not require treatment for surgery. Von Willebrand's activity and antigen ordered patient reports she does have heavy periods and they returned as normal however these levels will increase significantly in pregnancy and may be falsely normal.  If she has significant bleeding either during pregnancy or after pregnancy these levels will need to be repeated at least 12 weeks outside of pregnancy.         Family history of thrombosis in first degree relative    Overview     She reports that her father has had multiple DVT prior to age 50 and many in association with surgery.  She will have her father signed a consent to see if he is a carrier for thrombophilia.  If he has not been screened then recommend screening her for inherited thrombophilia including Antithrombin III, protein C and protein S activity levels and prothrombin gene and factor V Leiden - results normal for pregnancy          Encounter for care and examination of lactating mother    Preeclampsia    Overview     Multiple mildly elevated BP's at home and at 34 weeks, 35 week office visits   UPC at 33 weeks 0.31  - twice weekly surveillance  - weekly labs  - delivery at 37 weeks          Current Assessment & Plan     CBC and CMP wnl  Monitor blood pressures closely postpartum    Systolic (24hrs), Av , Min:116 , Max:137   Diastolic (24hrs), Av, Min:55, Max:68             Sterilization education    Current Assessment & Plan     S/p BS with time of RLTCS         History of postpartum hemorrhage    Current Assessment & Plan     T&C 2U         Obesity (BMI 35.0-39.9 without comorbidity)        Discharge med list:  Contraception: S/p Bilateral Salpingectomy     Medication List      CONTINUE taking these medications     Blood Pressure  Cuff Misc; Use in the morning     ASK your doctor about these medications     albuterol 90 mcg/act inhaler; Commonly known as: PROVENTIL HFA,VENTOLIN   HFA   cetirizine 10 mg tablet; Commonly known as: ZyrTEC   EPINEPHrine 0.3 mg/0.3 mL Soaj; Commonly known as: EPIPEN; Inject 0.3 mL   (0.3 mg total) into a muscle once for 1 dose   fluticasone 50 mcg/act nasal spray; Commonly known as: FLONASE   metoclopramide 10 mg tablet; Commonly known as: Reglan; Take 1 tablet   (10 mg total) by mouth 3 (three) times a day as needed (headache)   PRENATAL 1 PLUS 1 PO   Probiotic Daily Caps       Condition at discharge:   good     Disposition:   Home    Planned Readmission:   No    Kendy Araya MD  PGY 1, Obstetrics and Gynecology  6/14/2024  10:23 AM

## 2024-06-10 NOTE — ANESTHESIA PROCEDURE NOTES
Spinal Block    Patient location during procedure: OR  Start time: 6/10/2024 1:11 PM  Reason for block: at surgeon's request and primary anesthetic  Staffing  Performed by: Armando Landon DO  Authorized by: Armando Landon DO    Preanesthetic Checklist  Completed: patient identified, IV checked, risks and benefits discussed, surgical consent, monitors and equipment checked, pre-op evaluation and timeout performed  Spinal Block  Patient position: sitting  Prep: Betadine and site prepped and draped  Patient monitoring: frequent blood pressure checks, continuous pulse ox and heart rate  Approach: midline  Location: L3-4  Needle  Needle type: Pencan   Needle gauge: 24 G  Needle length: 4 in  Assessment  Sensory level: T4  Injection Assessment:  negative aspiration for heme, no paresthesia on injection and positive aspiration for clear CSF.  Post-procedure:  site cleaned

## 2024-06-10 NOTE — ANESTHESIA POSTPROCEDURE EVALUATION
"Post-Op Assessment Note    CV Status:  Stable    Pain management: adequate       Mental Status:  Alert and awake   Hydration Status:  Euvolemic   PONV Controlled:  Controlled   Airway Patency:  Patent     Post Op Vitals Reviewed: Yes    No anethesia notable event occurred.    Staff: Anesthesiologist               BP      Temp      Pulse     Resp      SpO2      /68 (BP Location: Right arm)   Pulse 68   Temp 97.8 °F (36.6 °C) (Oral)   Resp 18   Ht 5' 5\" (1.651 m)   Wt 108 kg (237 lb)   SpO2 100%   Breastfeeding Yes   BMI 39.44 kg/m²     "

## 2024-06-10 NOTE — PLAN OF CARE
Problem: PAIN - ADULT  Goal: Verbalizes/displays adequate comfort level or baseline comfort level  Description: Interventions:  - Encourage patient to monitor pain and request assistance  - Assess pain using appropriate pain scale  - Administer analgesics based on type and severity of pain and evaluate response  - Implement non-pharmacological measures as appropriate and evaluate response  - Consider cultural and social influences on pain and pain management  - Notify physician/advanced practitioner if interventions unsuccessful or patient reports new pain  Outcome: Progressing     Problem: INFECTION - ADULT  Goal: Absence or prevention of progression during hospitalization  Description: INTERVENTIONS:  - Assess and monitor for signs and symptoms of infection  - Monitor lab/diagnostic results  - Monitor all insertion sites, i.e. indwelling lines, tubes, and drains  - Monitor endotracheal if appropriate and nasal secretions for changes in amount and color  - [place] appropriate cooling/warming therapies per order  - Administer medications as ordered  - Instruct and encourage patient and family to use good hand hygiene technique  - Identify and instruct in appropriate isolation precautions for identified infection/condition  Outcome: Progressing  Goal: Absence of fever/infection during neutropenic period  Description: INTERVENTIONS:  - Monitor WBC    Outcome: Progressing     Problem: SAFETY ADULT  Goal: Patient will remain free of falls  Description: INTERVENTIONS:  - Educate patient/family on patient safety including physical limitations  - Instruct patient to call for assistance with activity   - Consult OT/PT to assist with strengthening/mobility   - Keep Call bell within reach  - Keep bed low and locked with side rails adjusted as appropriate  - Keep care items and personal belongings within reach  - Initiate and maintain comfort rounds  - Make Fall Risk Sign visible to staff  - Offer Toileting every  Hours,  in advance of need  - Initiate/Maintain alarm  - Obtain necessary fall risk management equipment:   - Apply yellow socks and bracelet for high fall risk patients  - Consider moving patient to room near nurses station  Outcome: Progressing  Goal: Maintain or return to baseline ADL function  Description: INTERVENTIONS:  -  Assess patient's ability to carry out ADLs; assess patient's baseline for ADL function and identify physical deficits which impact ability to perform ADLs (bathing, care of mouth/teeth, toileting, grooming, dressing, etc.)  - Assess/evaluate cause of self-care deficits   - Assess range of motion  - Assess patient's mobility; develop plan if impaired  - Assess patient's need for assistive devices and provide as appropriate  - Encourage maximum independence but intervene and supervise when necessary  - Involve family in performance of ADLs  - Assess for home care needs following discharge   - Consider OT consult to assist with ADL evaluation and planning for discharge  - Provide patient education as appropriate  Outcome: Progressing  Goal: Maintains/Returns to pre admission functional level  Description: INTERVENTIONS:  - Perform AM-PAC 6 Click Basic Mobility/ Daily Activity assessment daily.  - Set and communicate daily mobility goal to care team and patient/family/caregiver.   - Collaborate with rehabilitation services on mobility goals if consulted  - Perform Range of Motion  times a day.  - Reposition patient every  hours.  - Dangle patient  times a day  - Stand patient  times a day  - Ambulate patient  times a day  - Out of bed to chair  times a day   - Out of bed for meals  times a day  - Out of bed for toileting  - Record patient progress and toleration of activity level   Outcome: Progressing     Problem: Knowledge Deficit  Goal: Patient/family/caregiver demonstrates understanding of disease process, treatment plan, medications, and discharge instructions  Description: Complete learning  assessment and assess knowledge base.  Interventions:  - Provide teaching at level of understanding  - Provide teaching via preferred learning methods  Outcome: Progressing     Problem: DISCHARGE PLANNING  Goal: Discharge to home or other facility with appropriate resources  Description: INTERVENTIONS:  - Identify barriers to discharge w/patient and caregiver  - Arrange for needed discharge resources and transportation as appropriate  - Identify discharge learning needs (meds, wound care, etc.)  - Arrange for interpretive services to assist at discharge as needed  - Refer to Case Management Department for coordinating discharge planning if the patient needs post-hospital services based on physician/advanced practitioner order or complex needs related to functional status, cognitive ability, or social support system  Outcome: Progressing

## 2024-06-10 NOTE — ANESTHESIA PREPROCEDURE EVALUATION
Procedure:   SECTION () REPEAT (Uterus)  LIGATION/COAGULATION TUBAL (Abdomen)    Relevant Problems   CARDIO   (+) Antepartum mild preeclampsia      GYN   (+) 36 weeks gestation of pregnancy   (+) Supervision of high risk pregnancy in third trimester      PULMONARY   (+) Asthma   (+) Asthma affecting pregnancy in third trimester      Other   (+) Obesity (BMI 35.0-39.9 without comorbidity)        Physical Exam    Airway    Mallampati score: II  TM Distance: >3 FB  Neck ROM: full     Dental   No notable dental hx     Cardiovascular  Rhythm: regular, Rate: normal, Cardiovascular exam normal    Pulmonary  Pulmonary exam normal Breath sounds clear to auscultation    Other Findings  post-pubertal.      Anesthesia Plan  ASA Score- 2     Anesthesia Type- spinal with ASA Monitors.         Additional Monitors:     Airway Plan:            Plan Factors-    Chart reviewed.   Existing labs reviewed. Patient summary reviewed.                  Induction-     Postoperative Plan- Plan for postoperative opioid use.     Perioperative Resuscitation Plan - Level 1 - Full Code.       Informed Consent- Anesthetic plan and risks discussed with patient.

## 2024-06-11 LAB
ERYTHROCYTE [DISTWIDTH] IN BLOOD BY AUTOMATED COUNT: 13.5 % (ref 11.6–15.1)
HCT VFR BLD AUTO: 31 % (ref 34.8–46.1)
HGB BLD-MCNC: 10.3 G/DL (ref 11.5–15.4)
MCH RBC QN AUTO: 31.6 PG (ref 26.8–34.3)
MCHC RBC AUTO-ENTMCNC: 33.2 G/DL (ref 31.4–37.4)
MCV RBC AUTO: 95 FL (ref 82–98)
PLATELET # BLD AUTO: 151 THOUSANDS/UL (ref 149–390)
PMV BLD AUTO: 10.2 FL (ref 8.9–12.7)
RBC # BLD AUTO: 3.26 MILLION/UL (ref 3.81–5.12)
WBC # BLD AUTO: 8.39 THOUSAND/UL (ref 4.31–10.16)

## 2024-06-11 PROCEDURE — 85027 COMPLETE CBC AUTOMATED: CPT | Performed by: OBSTETRICS & GYNECOLOGY

## 2024-06-11 PROCEDURE — 99024 POSTOP FOLLOW-UP VISIT: CPT | Performed by: OBSTETRICS & GYNECOLOGY

## 2024-06-11 RX ORDER — IBUPROFEN 600 MG/1
600 TABLET ORAL EVERY 6 HOURS PRN
Status: DISCONTINUED | OUTPATIENT
Start: 2024-06-12 | End: 2024-06-12

## 2024-06-11 RX ORDER — IBUPROFEN 600 MG/1
600 TABLET ORAL EVERY 6 HOURS PRN
Status: DISCONTINUED | OUTPATIENT
Start: 2024-06-11 | End: 2024-06-11

## 2024-06-11 RX ADMIN — ACETAMINOPHEN 975 MG: 325 TABLET, FILM COATED ORAL at 08:01

## 2024-06-11 RX ADMIN — DOCUSATE SODIUM 100 MG: 100 CAPSULE, LIQUID FILLED ORAL at 17:48

## 2024-06-11 RX ADMIN — ACETAMINOPHEN 650 MG: 325 TABLET, FILM COATED ORAL at 14:50

## 2024-06-11 RX ADMIN — ENOXAPARIN SODIUM 40 MG: 40 INJECTION SUBCUTANEOUS at 08:01

## 2024-06-11 RX ADMIN — KETOROLAC TROMETHAMINE 30 MG: 30 INJECTION, SOLUTION INTRAMUSCULAR at 03:12

## 2024-06-11 RX ADMIN — ACETAMINOPHEN 975 MG: 325 TABLET, FILM COATED ORAL at 00:09

## 2024-06-11 RX ADMIN — ACETAMINOPHEN 650 MG: 325 TABLET, FILM COATED ORAL at 23:12

## 2024-06-11 RX ADMIN — KETOROLAC TROMETHAMINE 30 MG: 30 INJECTION, SOLUTION INTRAMUSCULAR at 09:57

## 2024-06-11 RX ADMIN — PRENATAL VIT W/ FE FUMARATE-FA TAB 27-0.8 MG 1 TABLET: 27-0.8 TAB at 08:01

## 2024-06-11 RX ADMIN — KETOROLAC TROMETHAMINE 30 MG: 30 INJECTION, SOLUTION INTRAMUSCULAR; INTRAVENOUS at 17:48

## 2024-06-11 RX ADMIN — DOCUSATE SODIUM 100 MG: 100 CAPSULE, LIQUID FILLED ORAL at 08:01

## 2024-06-11 RX ADMIN — LORATADINE 10 MG: 10 TABLET ORAL at 08:01

## 2024-06-11 RX ADMIN — SENNOSIDES 8.6 MG: 8.6 TABLET, FILM COATED ORAL at 23:12

## 2024-06-11 NOTE — UTILIZATION REVIEW
"NOTIFICATION OF INPATIENT ADMISSION   MATERNITY/DELIVERY AUTHORIZATION REQUEST   SERVICING FACILITY:   [facility]  Parent Child Health - L&D, [place], NICU  [address] [place] [address]  Tax ID: [id number]  NPI: [id number] ATTENDING PROVIDER:  Attending Name and NPI#: Kaila Porter Md [[id number]]  Address: [address] [place] [address]  Phone: [phone]     ADMISSION INFORMATION:  Place of Service: Inpatient [facility]  Place of Service Code: 21  Inpatient Admission Date/Time: 6/10/24 11:29 AM  Discharge Date/Time: No discharge date for patient encounter.  Admitting Diagnosis Code/Description:  Encounter for  delivery without indication [O82]   Mother: Delphine Carney 1995 Estimated Date of Delivery: 24  Delivering clinician: Kaila Porter   OB History          2    Para   2    Term   2            AB        Living   2         SAB        IAB        Ectopic        Multiple   0    Live Births   2               [place] Name & MRN:   Information for the patient's :  Rommel, Baby Boy (Delphine) [[id number]]    Delivery Information:  Sex: male  Delivered 6/10/2024 1:37 PM by , Low Transverse; Gestational Age: 37w0d     Measurements:  Weight: 7 lb 4.4 oz (3300 g);  Height: 19.75\"    APGAR 1 minute 5 minutes 10 minutes   Totals: 8 9       UTILIZATION REVIEW CONTACT:  Jaye العراقي, Utilization   Network Utilization Review Department  Phone: [phone]  Fax [phone]  Email: [email or web address]  Contact for approvals/pending authorizations, clinical reviews, and discharge.   PHYSICIAN ADVISORY SERVICES:  Medical Necessity Denial & Hykn-wq-Rqjm Review  Phone: [phone]  Fax: [phone]  Email: [email or web address]   DISCHARGE SUPPORT TEAM:  For Patients Discharge Needs & Updates  Phone: [phone] opt. 2 Fax: [phone]  Email: " [email or web address]

## 2024-06-11 NOTE — PROGRESS NOTES
Progress Note - OB/GYN   Delphine Carney 29 y.o. female MRN: [id number]  Unit/Bed#: -01 Encounter: [id number]      Assessment/Plan    Delphine Carney is a 29 y.o.  who is PPD 1 s/p LTCS at 37w0d     History of postpartum hemorrhage  Assessment & Plan  T&C 2U    Sterilization education  Assessment & Plan  S/p BS with time of RLTCS    Preeclampsia  Assessment & Plan  CBC and CMP wnl  Monitor blood pressures closely postpartum    Systolic (24hrs), Av , Min:116 , Max:137   Diastolic (24hrs), Av, Min:55, Max:80        Asthma  Assessment & Plan  Continue albuterol PRN  Avoid hemabate    * S/P repeat low transverse   Assessment & Plan  Routine postpartum care  Encourage ambulation  Encourage familial bonding  Lactation support as needed  Pain: Motrin/Tylenol around the clock, oxycodone if needed  Postpartum Contraceptive plan: s/p BS  Pre-delivery Hgb 11.8 --> 10.3  Ling catheter: out, voiding spontaneously  DVT Ppx: ambulation, SCDs, Lovenox 40mg           Disposition: Anticipate discharge home postpartum Day #2-3  Barriers to discharge: ongoing couplet care      Subjective/Objective     Subjective: Postpartum state    Pain: no  Tolerating PO: yes  Voiding: yes  Flatus: yes  BM: no  Ambulating: yes  Breastfeeding: Breastfeeding and Using breast pump  Chest pain: no  Shortness of breath: no  Leg pain: no  Lochia: minimal    Objective:     Vitals:  Vitals:    06/10/24 1800 06/10/24 1900 06/10/24 2300 24 0300   BP: 116/80 136/72 119/72 121/72   BP Location: Right arm Right arm Right arm Right arm   Pulse: 76 75 74 80   Resp: 18 18 18 18   Temp:  97.5 °F (36.4 °C) 97.5 °F (36.4 °C) 97.6 °F (36.4 °C)   TempSrc:  Temporal Temporal Temporal   SpO2:  100% 100% 100%   Weight:       Height:           Physical Exam:   GEN: appears well, alert and oriented x 3, pleasant and cooperative   CV: Regular rate and rhythm  RESP: non labored breathing, lungs clear to auscultation  ABDOMEN: soft, no  tenderness, no distention, Uterine fundus firm and non-tender, -1 cm below the umbilicus, incision c/d/i  EXTREMITIES: non-tender  NEURO Alert and oriented to person, place, and time.       Lab Results   Component Value Date    WBC 8.39 06/11/2024    HGB 10.3 (L) 06/11/2024    HCT 31.0 (L) 06/11/2024    MCV 95 06/11/2024     06/11/2024         Kendy Araya MD  Obstetrics & Gynecology  06/11/24

## 2024-06-11 NOTE — PLAN OF CARE
Problem: PAIN - ADULT  Goal: Verbalizes/displays adequate comfort level or baseline comfort level  Description: Interventions:  - Encourage patient to monitor pain and request assistance  - Assess pain using appropriate pain scale  - Administer analgesics based on type and severity of pain and evaluate response  - Implement non-pharmacological measures as appropriate and evaluate response  - Consider cultural and social influences on pain and pain management  - Notify physician/advanced practitioner if interventions unsuccessful or patient reports new pain  Outcome: Progressing     Problem: INFECTION - ADULT  Goal: Absence or prevention of progression during hospitalization  Description: INTERVENTIONS:  - Assess and monitor for signs and symptoms of infection  - Monitor lab/diagnostic results  - Administer medications as ordered  - Instruct and encourage patient and family to use good hand hygiene technique  - Identify and instruct in appropriate isolation precautions for identified infection/condition  Outcome: Progressing  Goal: Absence of fever/infection during neutropenic period  Description: INTERVENTIONS:  - Monitor WBC    Outcome: Progressing     Problem: SAFETY ADULT  Goal: Patient will remain free of falls  Description: INTERVENTIONS:  - Keep Call bell within reach  - Keep bed low and locked with side rails adjusted as appropriate  - Keep care items and personal belongings within reach  - Initiate and maintain comfort rounds  Outcome: Progressing  Goal: Maintain or return to baseline ADL function  Description: INTERVENTIONS:  - Provide patient education as appropriate  Outcome: Progressing  Goal: Maintains/Returns to pre admission functional level  Description: INTERVENTIONS:  - Record patient progress and toleration of activity level   Outcome: Progressing     Problem: Knowledge Deficit  Goal: Patient/family/caregiver demonstrates understanding of disease process, treatment plan, medications, and  discharge instructions  Description: Complete learning assessment and assess knowledge base.  Interventions:  - Provide teaching at level of understanding  - Provide teaching via preferred learning methods  Outcome: Progressing     Problem: DISCHARGE PLANNING  Goal: Discharge to home or other facility with appropriate resources  Description: INTERVENTIONS:  - Identify barriers to discharge w/patient and caregiver  - Arrange for needed discharge resources and transportation as appropriate  - Identify discharge learning needs (meds, wound care, etc.)  - Arrange for interpretive services to assist at discharge as needed  - Refer to Case Management Department for coordinating discharge planning if the patient needs post-hospital services based on physician/advanced practitioner order or complex needs related to functional status, cognitive ability, or social support system  Outcome: Progressing

## 2024-06-11 NOTE — CASE MANAGEMENT
Case Management Progress Note    Patient name Delphine Carney  Location /-01 MRN [id number]  : 1995 Date 2024       LOS (days): 1  Geometric Mean LOS (GMLOS) (days):   Days to GMLOS:        OBJECTIVE:        Current admission status: Inpatient  Preferred Pharmacy:   [facility] [place] - [address]  [address]  [facility] [address]  Phone: [phone] Fax: [phone]    Primary Care Provider: Jacob Munguia DO    Primary Insurance: BLUE CROSS  Secondary Insurance:     PROGRESS NOTE:    Consult(s):  NICU assessment      Delivery method/date: 6/10   Gestational age 37+0  Admitted to NICU for respiratory distress    CM met w/MOB who provided the following information:      Baby's name/gender: ERIN Carney  Mother of baby: Delphine Carney  Father of baby//SO: Nacho  Other children: 3.4yo son  Lives with: MOB, FOB, son  Baby Supplies: Confirmed having all necessary supplies  Bottle or Breast Feeding: KERI intends to exclusively pump. Confirmed she received pump through insurance.  Government Assistance Programs/WIC/EBT/SSI:  MOB denies   Work/School: Both MOB and FOB are employed  Transportation:  Both MOB and FOB drive  Prenatal care: [facility]   Pediatrician:  [place]  Mental Health Hx or Treatment: KERI reports hx of anxiety. She has been seeing therapist weekly for 3 yrs virtually.   Insurance for baby: [facility]  NICU Resources/[facility]AG[facility]: NICU resource packet provided. Informed of Navigate the NICU sessions.         MOB denies any other CM needs at this time. Encouraged family to contact CM as needed.     CM will follow infant in NICU through discharge

## 2024-06-11 NOTE — PLAN OF CARE
Problem: PAIN - ADULT  Goal: Verbalizes/displays adequate comfort level or baseline comfort level  Description: Interventions:  - Encourage patient to monitor pain and request assistance  - Assess pain using appropriate pain scale  - Administer analgesics based on type and severity of pain and evaluate response  - Implement non-pharmacological measures as appropriate and evaluate response  - Consider cultural and social influences on pain and pain management  - Notify physician/advanced practitioner if interventions unsuccessful or patient reports new pain  Outcome: Progressing     Problem: INFECTION - ADULT  Goal: Absence or prevention of progression during hospitalization  Description: INTERVENTIONS:  - Assess and monitor for signs and symptoms of infection  - Monitor lab/diagnostic results  - Monitor all insertion sites, i.e. indwelling lines, tubes, and drains  - Monitor endotracheal if appropriate and nasal secretions for changes in amount and color  - [place] appropriate cooling/warming therapies per order  - Administer medications as ordered  - Instruct and encourage patient and family to use good hand hygiene technique  - Identify and instruct in appropriate isolation precautions for identified infection/condition  Outcome: Progressing  Goal: Absence of fever/infection during neutropenic period  Description: INTERVENTIONS:  - Monitor WBC    Outcome: Progressing     Problem: SAFETY ADULT  Goal: Patient will remain free of falls  Description: INTERVENTIONS:  - Educate patient/family on patient safety including physical limitations  - Instruct patient to call for assistance with activity   - Consult OT/PT to assist with strengthening/mobility   - Keep Call bell within reach  - Keep bed low and locked with side rails adjusted as appropriate  - Keep care items and personal belongings within reach  - Initiate and maintain comfort rounds  - Make Fall Risk Sign visible to staff  - Offer Toileting every  Hours,  in advance of need  - Initiate/Maintain alarm  - Obtain necessary fall risk management equipment:   - Apply yellow socks and bracelet for high fall risk patients  - Consider moving patient to room near nurses station  Outcome: Progressing  Goal: Maintain or return to baseline ADL function  Description: INTERVENTIONS:  -  Assess patient's ability to carry out ADLs; assess patient's baseline for ADL function and identify physical deficits which impact ability to perform ADLs (bathing, care of mouth/teeth, toileting, grooming, dressing, etc.)  - Assess/evaluate cause of self-care deficits   - Assess range of motion  - Assess patient's mobility; develop plan if impaired  - Assess patient's need for assistive devices and provide as appropriate  - Encourage maximum independence but intervene and supervise when necessary  - Involve family in performance of ADLs  - Assess for home care needs following discharge   - Consider OT consult to assist with ADL evaluation and planning for discharge  - Provide patient education as appropriate  Outcome: Progressing  Goal: Maintains/Returns to pre admission functional level  Description: INTERVENTIONS:  - Perform AM-PAC 6 Click Basic Mobility/ Daily Activity assessment daily.  - Set and communicate daily mobility goal to care team and patient/family/caregiver.   - Collaborate with rehabilitation services on mobility goals if consulted  - Perform Range of Motion  times a day.  - Reposition patient every  hours.  - Dangle patient  times a day  - Stand patient  times a day  - Ambulate patient  times a day  - Out of bed to chair  times a day   - Out of bed for meals times a day  - Out of bed for toileting  - Record patient progress and toleration of activity level   Outcome: Progressing     Problem: Knowledge Deficit  Goal: Patient/family/caregiver demonstrates understanding of disease process, treatment plan, medications, and discharge instructions  Description: Complete learning  assessment and assess knowledge base.  Interventions:  - Provide teaching at level of understanding  - Provide teaching via preferred learning methods  Outcome: Progressing     Problem: DISCHARGE PLANNING  Goal: Discharge to home or other facility with appropriate resources  Description: INTERVENTIONS:  - Identify barriers to discharge w/patient and caregiver  - Arrange for needed discharge resources and transportation as appropriate  - Identify discharge learning needs (meds, wound care, etc.)  - Arrange for interpretive services to assist at discharge as needed  - Refer to Case Management Department for coordinating discharge planning if the patient needs post-hospital services based on physician/advanced practitioner order or complex needs related to functional status, cognitive ability, or social support system  Outcome: Progressing

## 2024-06-12 PROCEDURE — 99024 POSTOP FOLLOW-UP VISIT: CPT | Performed by: NURSE PRACTITIONER

## 2024-06-12 RX ORDER — IBUPROFEN 600 MG/1
600 TABLET ORAL EVERY 6 HOURS PRN
Status: DISCONTINUED | OUTPATIENT
Start: 2024-06-12 | End: 2024-06-14 | Stop reason: HOSPADM

## 2024-06-12 RX ADMIN — DOCUSATE SODIUM 100 MG: 100 CAPSULE, LIQUID FILLED ORAL at 09:08

## 2024-06-12 RX ADMIN — IBUPROFEN 600 MG: 600 TABLET, FILM COATED ORAL at 17:43

## 2024-06-12 RX ADMIN — ACETAMINOPHEN 650 MG: 325 TABLET, FILM COATED ORAL at 07:24

## 2024-06-12 RX ADMIN — ENOXAPARIN SODIUM 40 MG: 40 INJECTION SUBCUTANEOUS at 09:08

## 2024-06-12 RX ADMIN — SENNOSIDES 8.6 MG: 8.6 TABLET, FILM COATED ORAL at 21:11

## 2024-06-12 RX ADMIN — LORATADINE 10 MG: 10 TABLET ORAL at 09:08

## 2024-06-12 RX ADMIN — PRENATAL VIT W/ FE FUMARATE-FA TAB 27-0.8 MG 1 TABLET: 27-0.8 TAB at 09:08

## 2024-06-12 RX ADMIN — ACETAMINOPHEN 650 MG: 325 TABLET, FILM COATED ORAL at 21:11

## 2024-06-12 RX ADMIN — IBUPROFEN 600 MG: 600 TABLET, FILM COATED ORAL at 12:06

## 2024-06-12 RX ADMIN — DOCUSATE SODIUM 100 MG: 100 CAPSULE, LIQUID FILLED ORAL at 17:43

## 2024-06-12 RX ADMIN — ACETAMINOPHEN 650 MG: 325 TABLET, FILM COATED ORAL at 13:58

## 2024-06-12 RX ADMIN — IBUPROFEN 600 MG: 600 TABLET ORAL at 02:49

## 2024-06-12 NOTE — CASE MANAGEMENT
Case Management Progress Note    Patient name Delphine Carney  Location /-01 MRN [id number]  : 1995 Date 2024       LOS (days): 2  Geometric Mean LOS (GMLOS) (days):   Days to GMLOS:        OBJECTIVE:        Current admission status: Inpatient  Preferred Pharmacy:   [facility] [address] [place] [address] - [address]  [address]  [facility] [address]  Phone: [phone] Fax: [phone]    Primary Care Provider: Jacob Munguia DO    Primary Insurance: BLUE CROSS  Secondary Insurance:     PROGRESS NOTE:      Baby was intubated last night. Chest tube placed yesterday.     MOB requesting script for [place] BabySat. Spoke with neonatologist. At this time, it is not recommended by AAP and script will not be provided. CM relayed information to MOB. Informed her the Dream Sock does not require a script, but is not covered by insurance and is $300 out of pocket. Suggested she follow up with pediatrician through [facility] or purchase out of pocket. MOB understanding.     Will continue to follow.

## 2024-06-12 NOTE — PROGRESS NOTES
POSTPARTUM NOTE  Delphine Carney 29 y.o. female MRN: [id number]  Unit/Bed#: -01 Encounter: [id number]    Delphine Carney is a   at postpartum day 2 from a  delivery on 6/10/2024 at 1337.  Baby is in NICU.      Preeclampsia  Assessment & Plan  CBC and CMP WNL  Has not req'd treatment    * S/P repeat low transverse   Assessment & Plan  QBL: 78ml, pre op Hgb 12.7 --> post op Hgb 10.3  Voiding: spontaneously  DVT ppx: SCD's and Lovenox QD  Pain: well-controlled w/oral analgesics Ibuprofen/Tylenol/Hilary  FEN: Tolerating regular diet  Routine postpartum care      Review of Systems:   -Constitutional: denies issues, ambulating without issue, reports pain currently -5/10   -Breasts: denies tenderness   -Cardiovascular: denies chest pain or SOB   -Gynecologic: lochia light   -Urinary: voiding without issue   -GI: tolerating PO, passing flatus, + bowel movement    Physical Exam:   -Vitals:   Vitals:    24 1500 24 2100 24 2300 24 0710   BP: 109/65 115/76 132/93 132/86   BP Location: Right arm Right arm Right arm    Pulse: 82 78 96 80   Resp: 16 18 18 18   Temp: 98 °F (36.7 °C) 97.8 °F (36.6 °C) 97.9 °F (36.6 °C) 97.9 °F (36.6 °C)   TempSrc: Oral Temporal Temporal Temporal   SpO2: 98% 99% 98%    Weight:       Height:          -General: A&Ox3, no acute distress noted   -Pulmonary: normal effort, no SOB noted   -Cardiovascular: regular HR   -Abdomen: soft, non-tender, non-distended, + bowel sounds x4 quadrants, uterine fundus firm @-2 cm below the umbilicus, low transverse incision appears clean/dry/intact with steri strips   -Extremities: nontender, +1 BLE edema noted   -Breasts: deferred   -Pelvic exam: deferred    Results from last 7 days   Lab Units 24  0556 06/10/24  1131 24  1100   HEMOGLOBIN g/dL 10.3* 12.7 11.8   HEMATOCRIT % 31.0* 37.1 35.2   MCV fL 95 91 94       Assessment/Plan:  1. Continue routine postpartum care.  Ambulation and self-care  encouraged.  2. Contraception: Surgical sterilization performed in conjunction w/ delivery.  3. Feeding infant via breast pumping.  4. She is Rh positive.  Rhogam is not indicated.  5. Vaccine status:  No vaccines are currently indicated.  6. Continue DVT prophylaxis with SCD use while not ambulating and Lovenox 40mg QD while inpatient.  7. Anticipate discharge to home tomorrow.    PETE Dennis  2024

## 2024-06-12 NOTE — PLAN OF CARE
Problem: PAIN - ADULT  Goal: Verbalizes/displays adequate comfort level or baseline comfort level  Description: Interventions:  - Encourage patient to monitor pain and request assistance  - Assess pain using appropriate pain scale  - Administer analgesics based on type and severity of pain and evaluate response  - Implement non-pharmacological measures as appropriate and evaluate response  - Consider cultural and social influences on pain and pain management  - Notify physician/advanced practitioner if interventions unsuccessful or patient reports new pain  Outcome: Progressing     Problem: INFECTION - ADULT  Goal: Absence or prevention of progression during hospitalization  Description: INTERVENTIONS:  - Assess and monitor for signs and symptoms of infection  - Monitor lab/diagnostic results  - Administer medications as ordered  - Instruct and encourage patient and family to use good hand hygiene technique  - Identify and instruct in appropriate isolation precautions for identified infection/condition  Outcome: Progressing  Goal: Absence of fever/infection during neutropenic period  Description: INTERVENTIONS:  - Monitor WBC    Outcome: Progressing     Problem: SAFETY ADULT  Goal: Patient will remain free of falls  Description: INTERVENTIONS:  - Keep Call bell within reach  - Keep bed low and locked with side rails adjusted as appropriate  - Keep care items and personal belongings within reach  - Initiate and maintain comfort rounds  Outcome: Progressing  Goal: Maintain or return to baseline ADL function  Description: INTERVENTIONS:  - Provide patient education as appropriate  Outcome: Progressing  Goal: Maintains/Returns to pre admission functional level  Description: INTERVENTIONS:  - Record patient progress and toleration of activity level   Outcome: Progressing     Problem: Knowledge Deficit  Goal: Patient/family/caregiver demonstrates understanding of disease process, treatment plan, medications, and  discharge instructions  Description: Complete learning assessment and assess knowledge base.  Interventions:  - Provide teaching at level of understanding  - Provide teaching via preferred learning methods  Outcome: Progressing     Problem: DISCHARGE PLANNING  Goal: Discharge to home or other facility with appropriate resources  Description: INTERVENTIONS:  - Identify barriers to discharge w/patient and caregiver  - Arrange for needed discharge resources and transportation as appropriate  - Identify discharge learning needs (meds, wound care, etc.)  - Arrange for interpretive services to assist at discharge as needed  - Refer to Case Management Department for coordinating discharge planning if the patient needs post-hospital services based on physician/advanced practitioner order or complex needs related to functional status, cognitive ability, or social support system  Outcome: Progressing     Problem: ANTEPARTUM  Goal: Maintain pregnancy as long as maternal and/or fetal condition is stable  Description: INTERVENTIONS:  - Maternal surveillance  - Fetal surveillance  - Monitor uterine activity  - Medications as ordered  - Bedrest  Outcome: Progressing     Problem: BIRTH - VAGINAL/ SECTION  Goal: Fetal and maternal status remain reassuring during the birth process  Description: INTERVENTIONS:  - Monitor vital signs  - Monitor fetal heart rate  - Monitor uterine activity  - Monitor labor progression (vaginal delivery)  - DVT prophylaxis  - Antibiotic prophylaxis  Outcome: Progressing  Goal: Emotionally satisfying birthing experience for mother/fetus  Description: Interventions:  - Assess, plan, implement and evaluate the nursing care given to the patient in labor  - Advocate the philosophy that each childbirth experience is a unique experience and support the family's chosen level of involvement and control during the labor process   - Actively participate in both the patient's and family's teaching of the  birth process  - Consider cultural, Islam and age-specific factors and plan care for the patient in labor  Outcome: Progressing     Problem: POSTPARTUM  Goal: Experiences normal postpartum course  Description: INTERVENTIONS:  - Monitor maternal vital signs  - Assess uterine involution and lochia  Outcome: Progressing  Goal: Appropriate maternal -  bonding  Description: INTERVENTIONS:  - Identify family support  - Assess for appropriate maternal/infant bonding   -Encourage maternal/infant bonding opportunities  - Referral to  or  as needed  Outcome: Progressing  Goal: Establishment of infant feeding pattern  Description: INTERVENTIONS:  - Assess breast/bottle feeding  - Refer to lactation as needed  Outcome: Progressing  Goal: Incision(s), wounds(s) or drain site(s) healing without S/S of infection  Description: INTERVENTIONS  - Assess and document dressing, incision, wound bed, drain sites and surrounding tissue  - Provide patient and family education  - Perform skin care/dressing changes every  Outcome: Progressing

## 2024-06-12 NOTE — PLAN OF CARE
Problem: PAIN - ADULT  Goal: Verbalizes/displays adequate comfort level or baseline comfort level  Description: Interventions:  - Encourage patient to monitor pain and request assistance  - Assess pain using appropriate pain scale  - Administer analgesics based on type and severity of pain and evaluate response  - Implement non-pharmacological measures as appropriate and evaluate response  - Consider cultural and social influences on pain and pain management  - Notify physician/advanced practitioner if interventions unsuccessful or patient reports new pain  Outcome: Progressing     Problem: INFECTION - ADULT  Goal: Absence or prevention of progression during hospitalization  Description: INTERVENTIONS:  - Assess and monitor for signs and symptoms of infection  - Monitor lab/diagnostic results  - Administer medications as ordered  - Instruct and encourage patient and family to use good hand hygiene technique  - Identify and instruct in appropriate isolation precautions for identified infection/condition  Outcome: Progressing  Goal: Absence of fever/infection during neutropenic period  Description: INTERVENTIONS:  - Monitor WBC    Outcome: Progressing     Problem: SAFETY ADULT  Goal: Patient will remain free of falls  Description: INTERVENTIONS:  - Keep Call bell within reach  - Keep bed low and locked with side rails adjusted as appropriate  - Keep care items and personal belongings within reach  - Initiate and maintain comfort rounds  Outcome: Progressing  Goal: Maintain or return to baseline ADL function  Description: INTERVENTIONS:  - Provide patient education as appropriate  Outcome: Progressing  Goal: Maintains/Returns to pre admission functional level  Description: INTERVENTIONS:  - Record patient progress and toleration of activity level   Outcome: Progressing     Problem: Knowledge Deficit  Goal: Patient/family/caregiver demonstrates understanding of disease process, treatment plan, medications, and  discharge instructions  Description: Complete learning assessment and assess knowledge base.  Interventions:  - Provide teaching at level of understanding  - Provide teaching via preferred learning methods  Outcome: Progressing     Problem: DISCHARGE PLANNING  Goal: Discharge to home or other facility with appropriate resources  Description: INTERVENTIONS:  - Identify barriers to discharge w/patient and caregiver  - Arrange for needed discharge resources and transportation as appropriate  - Identify discharge learning needs (meds, wound care, etc.)  - Arrange for interpretive services to assist at discharge as needed  - Refer to Case Management Department for coordinating discharge planning if the patient needs post-hospital services based on physician/advanced practitioner order or complex needs related to functional status, cognitive ability, or social support system  Outcome: Progressing     Problem: ANTEPARTUM  Goal: Maintain pregnancy as long as maternal and/or fetal condition is stable  Description: INTERVENTIONS:  - Maternal surveillance  - Fetal surveillance  - Monitor uterine activity  - Medications as ordered  - Bedrest  Outcome: Progressing     Problem: BIRTH - VAGINAL/ SECTION  Goal: Fetal and maternal status remain reassuring during the birth process  Description: INTERVENTIONS:  - Monitor vital signs  - Monitor fetal heart rate  - Monitor uterine activity  - Monitor labor progression (vaginal delivery)  - DVT prophylaxis  - Antibiotic prophylaxis  Outcome: Progressing  Goal: Emotionally satisfying birthing experience for mother/fetus  Description: Interventions:  - Assess, plan, implement and evaluate the nursing care given to the patient in labor  - Advocate the philosophy that each childbirth experience is a unique experience and support the family's chosen level of involvement and control during the labor process   - Actively participate in both the patient's and family's teaching of the  birth process  - Consider cultural, Moravian and age-specific factors and plan care for the patient in labor  Outcome: Progressing     Problem: POSTPARTUM  Goal: Experiences normal postpartum course  Description: INTERVENTIONS:  - Monitor maternal vital signs  - Assess uterine involution and lochia  Outcome: Progressing  Goal: Appropriate maternal -  bonding  Description: INTERVENTIONS:  - Identify family support  - Assess for appropriate maternal/infant bonding   -Encourage maternal/infant bonding opportunities  - Referral to  or  as needed  Outcome: Progressing  Goal: Establishment of infant feeding pattern  Description: INTERVENTIONS:  - Assess breast/bottle feeding  - Refer to lactation as needed  Outcome: Progressing  Goal: Incision(s), wounds(s) or drain site(s) healing without S/S of infection  Description: INTERVENTIONS  - Assess and document dressing, incision, wound bed, drain sites and surrounding tissue  - Provide patient and family education  - Perform skin care/dressing changes every   Outcome: Progressing

## 2024-06-13 LAB
ABO GROUP BLD BPU: NORMAL
ABO GROUP BLD BPU: NORMAL
BPU ID: NORMAL
BPU ID: NORMAL
CROSSMATCH: NORMAL
CROSSMATCH: NORMAL
UNIT DISPENSE STATUS: NORMAL
UNIT DISPENSE STATUS: NORMAL
UNIT PRODUCT CODE: NORMAL
UNIT PRODUCT CODE: NORMAL
UNIT PRODUCT VOLUME: 350 ML
UNIT PRODUCT VOLUME: 350 ML
UNIT RH: NORMAL
UNIT RH: NORMAL

## 2024-06-13 PROCEDURE — NC001 PR NO CHARGE: Performed by: OBSTETRICS & GYNECOLOGY

## 2024-06-13 PROCEDURE — 88302 TISSUE EXAM BY PATHOLOGIST: CPT | Performed by: PATHOLOGY

## 2024-06-13 PROCEDURE — 88307 TISSUE EXAM BY PATHOLOGIST: CPT | Performed by: PATHOLOGY

## 2024-06-13 RX ORDER — ACETAMINOPHEN 325 MG/1
650 TABLET ORAL EVERY 6 HOURS SCHEDULED
Status: COMPLETED | OUTPATIENT
Start: 2024-06-13 | End: 2024-06-14

## 2024-06-13 RX ORDER — NIFEDIPINE 30 MG/1
30 TABLET, EXTENDED RELEASE ORAL DAILY
Status: DISCONTINUED | OUTPATIENT
Start: 2024-06-13 | End: 2024-06-14 | Stop reason: HOSPADM

## 2024-06-13 RX ADMIN — PRENATAL VIT W/ FE FUMARATE-FA TAB 27-0.8 MG 1 TABLET: 27-0.8 TAB at 08:20

## 2024-06-13 RX ADMIN — IBUPROFEN 600 MG: 600 TABLET, FILM COATED ORAL at 15:42

## 2024-06-13 RX ADMIN — IBUPROFEN 600 MG: 600 TABLET, FILM COATED ORAL at 08:19

## 2024-06-13 RX ADMIN — ACETAMINOPHEN 325MG 650 MG: 325 TABLET ORAL at 19:40

## 2024-06-13 RX ADMIN — OXYCODONE 5 MG: 5 TABLET ORAL at 00:12

## 2024-06-13 RX ADMIN — SENNOSIDES 8.6 MG: 8.6 TABLET, FILM COATED ORAL at 22:01

## 2024-06-13 RX ADMIN — LORATADINE 10 MG: 10 TABLET ORAL at 08:20

## 2024-06-13 RX ADMIN — DOCUSATE SODIUM 100 MG: 100 CAPSULE, LIQUID FILLED ORAL at 17:32

## 2024-06-13 RX ADMIN — NIFEDIPINE 30 MG: 30 TABLET, EXTENDED RELEASE ORAL at 08:20

## 2024-06-13 RX ADMIN — ENOXAPARIN SODIUM 40 MG: 40 INJECTION SUBCUTANEOUS at 08:19

## 2024-06-13 RX ADMIN — DOCUSATE SODIUM 100 MG: 100 CAPSULE, LIQUID FILLED ORAL at 08:20

## 2024-06-13 RX ADMIN — ACETAMINOPHEN 650 MG: 325 TABLET, FILM COATED ORAL at 05:30

## 2024-06-13 RX ADMIN — IBUPROFEN 600 MG: 600 TABLET, FILM COATED ORAL at 22:01

## 2024-06-13 RX ADMIN — ACETAMINOPHEN 325MG 650 MG: 325 TABLET ORAL at 13:31

## 2024-06-13 RX ADMIN — IBUPROFEN 600 MG: 600 TABLET, FILM COATED ORAL at 00:12

## 2024-06-13 NOTE — PLAN OF CARE
Problem: PAIN - ADULT  Goal: Verbalizes/displays adequate comfort level or baseline comfort level  Description: Interventions:  - Encourage patient to monitor pain and request assistance  - Assess pain using appropriate pain scale  - Administer analgesics based on type and severity of pain and evaluate response  - Implement non-pharmacological measures as appropriate and evaluate response  - Consider cultural and social influences on pain and pain management  - Notify physician/advanced practitioner if interventions unsuccessful or patient reports new pain  Outcome: Progressing     Problem: INFECTION - ADULT  Goal: Absence or prevention of progression during hospitalization  Description: INTERVENTIONS:  - Assess and monitor for signs and symptoms of infection  - Monitor lab/diagnostic results  - Administer medications as ordered  - Instruct and encourage patient and family to use good hand hygiene technique  - Identify and instruct in appropriate isolation precautions for identified infection/condition  Outcome: Progressing  Goal: Absence of fever/infection during neutropenic period  Description: INTERVENTIONS:  - Monitor WBC    Outcome: Progressing     Problem: SAFETY ADULT  Goal: Patient will remain free of falls  Description: INTERVENTIONS:  - Keep Call bell within reach  - Keep bed low and locked with side rails adjusted as appropriate  - Keep care items and personal belongings within reach  - Initiate and maintain comfort rounds  Outcome: Progressing  Goal: Maintain or return to baseline ADL function  Description: INTERVENTIONS:  - Provide patient education as appropriate  Outcome: Progressing  Goal: Maintains/Returns to pre admission functional level  Description: INTERVENTIONS:  - Record patient progress and toleration of activity level   Outcome: Progressing     Problem: Knowledge Deficit  Goal: Patient/family/caregiver demonstrates understanding of disease process, treatment plan, medications, and  discharge instructions  Description: Complete learning assessment and assess knowledge base.  Interventions:  - Provide teaching at level of understanding  - Provide teaching via preferred learning methods  Outcome: Progressing     Problem: DISCHARGE PLANNING  Goal: Discharge to home or other facility with appropriate resources  Description: INTERVENTIONS:  - Identify barriers to discharge w/patient and caregiver  - Arrange for needed discharge resources and transportation as appropriate  - Identify discharge learning needs (meds, wound care, etc.)  - Arrange for interpretive services to assist at discharge as needed  - Refer to Case Management Department for coordinating discharge planning if the patient needs post-hospital services based on physician/advanced practitioner order or complex needs related to functional status, cognitive ability, or social support system  Outcome: Progressing     Problem: ANTEPARTUM  Goal: Maintain pregnancy as long as maternal and/or fetal condition is stable  Description: INTERVENTIONS:  - Maternal surveillance  - Fetal surveillance  - Monitor uterine activity  - Medications as ordered  - Bedrest  Outcome: Progressing     Problem: BIRTH - VAGINAL/ SECTION  Goal: Fetal and maternal status remain reassuring during the birth process  Description: INTERVENTIONS:  - Monitor vital signs  - Monitor fetal heart rate  - Monitor uterine activity  - Monitor labor progression (vaginal delivery)  - DVT prophylaxis  - Antibiotic prophylaxis  Outcome: Progressing  Goal: Emotionally satisfying birthing experience for mother/fetus  Description: Interventions:  - Assess, plan, implement and evaluate the nursing care given to the patient in labor  - Advocate the philosophy that each childbirth experience is a unique experience and support the family's chosen level of involvement and control during the labor process   - Actively participate in both the patient's and family's teaching of the  birth process  - Consider cultural, Yazidi and age-specific factors and plan care for the patient in labor  Outcome: Progressing     Problem: POSTPARTUM  Goal: Experiences normal postpartum course  Description: INTERVENTIONS:  - Monitor maternal vital signs  - Assess uterine involution and lochia  Outcome: Progressing  Goal: Appropriate maternal -  bonding  Description: INTERVENTIONS:  - Identify family support  - Assess for appropriate maternal/infant bonding   -Encourage maternal/infant bonding opportunities  - Referral to  or  as needed  Outcome: Progressing  Goal: Establishment of infant feeding pattern  Description: INTERVENTIONS:  - Assess breast/bottle feeding  - Refer to lactation as needed  Outcome: Progressing  Goal: Incision(s), wounds(s) or drain site(s) healing without S/S of infection  Description: INTERVENTIONS  - Assess and document dressing, incision, wound bed, drain sites and surrounding tissue  - Provide patient and family education  - Perform skin care/dressing changes every  Outcome: Progressing

## 2024-06-13 NOTE — PROGRESS NOTES
Progress Note - OB/GYN   Delphine Carney 29 y.o. female MRN: [id number]  Unit/Bed#: -01 Encounter: [id number]      Assessment/Plan    Delphine Carney is a 29 y.o.  who is PPD 3 s/p LTCS at 37w0d     History of postpartum hemorrhage  Assessment & Plan  T&C 2U    Sterilization education  Assessment & Plan  S/p BS with time of RLTCS    Preeclampsia  Assessment & Plan  CBC and CMP WNL  Continue to monitor Bps, consider starting oral antihypertensive meds if persistently elevated    Systolic (24hrs), Av , Min:129 , Max:155   Diastolic (24hrs), Av, Min:69, Max:90        Asthma  Assessment & Plan  Continue albuterol PRN  Avoid hemabate    * S/P repeat low transverse   Assessment & Plan  QBL: 78ml, pre op Hgb 12.7 --> post op Hgb 10.3  Voiding: spontaneously  DVT ppx: SCD's and Lovenox QD  Pain: well-controlled w/oral analgesics Ibuprofen/Tylenol/Hilary  FEN: Tolerating regular diet  Routine postpartum care           Disposition: Anticipate discharge home postpartum Day #4  Barriers to discharge: ongoing couplet care      Subjective/Objective     Subjective: Postpartum state    Pain: no  Tolerating PO: yes  Voiding: yes  Flatus: yes  BM: yes  Ambulating: yes  Breastfeeding: Using breast pump  Chest pain: no  Shortness of breath: no  Leg pain: no  Lochia: minimal    Objective:     Vitals:  Vitals:    24 1446 24 2000 24 2300 24 0300   BP: 135/89 153/90 155/81 129/69   BP Location:  Left arm Right arm Right arm   Pulse: 92 88 (!) 120 99   Resp: 16 18 20 18   Temp: (!) 97.2 °F (36.2 °C) 97.7 °F (36.5 °C) (!) 97.4 °F (36.3 °C) 98 °F (36.7 °C)   TempSrc: Temporal Temporal Temporal Temporal   SpO2:       Weight:       Height:           Physical Exam:   GEN: appears well, alert and oriented x 3, pleasant and cooperative   CV: Regular rate and rhythm  RESP: non labored breathing, lungs clear to auscultation  ABDOMEN: soft, no tenderness, no distention, Uterine fundus firm and  non-tender, -1 cm below the umbilicus, incision c/d/i  EXTREMITIES: non-tender  NEURO Alert and oriented to person, place, and time.       Lab Results   Component Value Date    WBC 8.39 06/11/2024    HGB 10.3 (L) 06/11/2024    HCT 31.0 (L) 06/11/2024    MCV 95 06/11/2024     06/11/2024         Kendy Araya MD  Obstetrics & Gynecology  06/13/24

## 2024-06-13 NOTE — PLAN OF CARE
Problem: PAIN - ADULT  Goal: Verbalizes/displays adequate comfort level or baseline comfort level  Description: Interventions:  - Encourage patient to monitor pain and request assistance  - Assess pain using appropriate pain scale  - Administer analgesics based on type and severity of pain and evaluate response  - Implement non-pharmacological measures as appropriate and evaluate response  - Consider cultural and social influences on pain and pain management  - Notify physician/advanced practitioner if interventions unsuccessful or patient reports new pain  6/13/2024 0847 by Laurel Lawler RN  Outcome: Progressing  6/13/2024 0847 by Laurel Lawler RN  Outcome: Progressing     Problem: INFECTION - ADULT  Goal: Absence or prevention of progression during hospitalization  Description: INTERVENTIONS:  - Assess and monitor for signs and symptoms of infection  - Monitor lab/diagnostic results  - Administer medications as ordered  - Instruct and encourage patient and family to use good hand hygiene technique  - Identify and instruct in appropriate isolation precautions for identified infection/condition  6/13/2024 0847 by Laurel Lawler RN  Outcome: Progressing  6/13/2024 0847 by Laurel Lawler RN  Outcome: Progressing  Goal: Absence of fever/infection during neutropenic period  Description: INTERVENTIONS:  - Monitor WBC    6/13/2024 0847 by Laurel Lawler RN  Outcome: Progressing  6/13/2024 0847 by Laurel Lawler RN  Outcome: Progressing     Problem: SAFETY ADULT  Goal: Patient will remain free of falls  Description: INTERVENTIONS:  - Keep Call bell within reach  - Keep bed low and locked with side rails adjusted as appropriate  - Keep care items and personal belongings within reach  - Initiate and maintain comfort rounds  6/13/2024 0847 by Laurel Lawler RN  Outcome: Progressing  6/13/2024 0847 by Laurel Lawler RN  Outcome: Progressing  Goal: Maintain or return to baseline ADL function  Description: INTERVENTIONS:  - Provide patient education as  appropriate  6/13/2024 0847 by Laurel Lawler RN  Outcome: Progressing  6/13/2024 0847 by Laurel Lawler RN  Outcome: Progressing  Goal: Maintains/Returns to pre admission functional level  Description: INTERVENTIONS:  - Record patient progress and toleration of activity level   6/13/2024 0847 by Laurel Lawler RN  Outcome: Progressing  6/13/2024 0847 by Laurel Lawler RN  Outcome: Progressing     Problem: Knowledge Deficit  Goal: Patient/family/caregiver demonstrates understanding of disease process, treatment plan, medications, and discharge instructions  Description: Complete learning assessment and assess knowledge base.  Interventions:  - Provide teaching at level of understanding  - Provide teaching via preferred learning methods  6/13/2024 0847 by Laurel Lawler RN  Outcome: Progressing  6/13/2024 0847 by Laurel Lawler RN  Outcome: Progressing     Problem: DISCHARGE PLANNING  Goal: Discharge to home or other facility with appropriate resources  Description: INTERVENTIONS:  - Identify barriers to discharge w/patient and caregiver  - Arrange for needed discharge resources and transportation as appropriate  - Identify discharge learning needs (meds, wound care, etc.)  - Arrange for interpretive services to assist at discharge as needed  - Refer to Case Management Department for coordinating discharge planning if the patient needs post-hospital services based on physician/advanced practitioner order or complex needs related to functional status, cognitive ability, or social support system  6/13/2024 0847 by Laurel Lawler RN  Outcome: Progressing  6/13/2024 0847 by Laurel Lawler RN  Outcome: Progressing     Problem: ANTEPARTUM  Goal: Maintain pregnancy as long as maternal and/or fetal condition is stable  Description: INTERVENTIONS:  - Maternal surveillance  - Fetal surveillance  - Monitor uterine activity  - Medications as ordered  - Bedrest  6/13/2024 0847 by Laurel Lawler RN  Outcome: Progressing  6/13/2024 0847 by Laurel Lawler RN  Outcome:  Progressing     Problem: BIRTH - VAGINAL/ SECTION  Goal: Fetal and maternal status remain reassuring during the birth process  Description: INTERVENTIONS:  - Monitor vital signs  - Monitor fetal heart rate  - Monitor uterine activity  - Monitor labor progression (vaginal delivery)  - DVT prophylaxis  - Antibiotic prophylaxis  2024 by Laurel Lawler RN  Outcome: Progressing  2024 by Laurel Lawler RN  Outcome: Progressing  Goal: Emotionally satisfying birthing experience for mother/fetus  Description: Interventions:  - Assess, plan, implement and evaluate the nursing care given to the patient in labor  - Advocate the philosophy that each childbirth experience is a unique experience and support the family's chosen level of involvement and control during the labor process   - Actively participate in both the patient's and family's teaching of the birth process  - Consider cultural, Alevism and age-specific factors and plan care for the patient in labor  2024 by Laurel Lawler RN  Outcome: Progressing  2024 by Laurel Lawler RN  Outcome: Progressing     Problem: POSTPARTUM  Goal: Experiences normal postpartum course  Description: INTERVENTIONS:  - Monitor maternal vital signs  - Assess uterine involution and lochia  2024 by Laurel Lawler RN  Outcome: Progressing  2024 by Laurel Lawler RN  Outcome: Progressing  Goal: Appropriate maternal -  bonding  Description: INTERVENTIONS:  - Identify family support  - Assess for appropriate maternal/infant bonding   -Encourage maternal/infant bonding opportunities  - Referral to  or  as needed  2024 by Laurel Lawler RN  Outcome: Progressing  2024 by Laurel Lawler RN  Outcome: Progressing  Goal: Establishment of infant feeding pattern  Description: INTERVENTIONS:  - Assess breast/bottle feeding  - Refer to lactation as needed  2024 by Laurel Lawler RN  Outcome: Progressing  2024  0847 by Laurel Lawler RN  Outcome: Progressing  Goal: Incision(s), wounds(s) or drain site(s) healing without S/S of infection  Description: INTERVENTIONS  - Assess and document dressing, incision, wound bed, drain sites and surrounding tissue  - Provide patient and family education    6/13/2024 0847 by Laurel Lawler RN  Outcome: Progressing  6/13/2024 0847 by Laurel Lawler RN  Outcome: Progressing

## 2024-06-13 NOTE — LACTATION NOTE
06/13/24 1130   Lactation Consultation   Reason for Consult 10 m;5 min;10 minute   Risk Factors NICU infant   Maternal Information   Has mother  before? Yes   How long did the the mother previously breastfeed? over a year   Previous Maternal Breastfeeding Challenges Exclusive pump and bottle fed;Other (Comment)  (over )   Exclusive Pump and Bottle Feed Yes   Breasts/Nipples   Date Pumping Initiated 06/10/24   Left Breast Filling;Engorged   Right Breast Filling;Engorged   Left Nipple Everted   Right Nipple Everted   Intervention Breast pump;Hand expression   Breastfeeding Status Yes   Breastfeeding Progress Pumping only   Reasons for not Breastfeeding Infant medical condition   Other OB Lactation Tools   Feeding Devices Pump;Bottle   Breast Pump   Pump 3;2;1  (Baby Budha Pump)   Pump Review/Education Setup, frequency, and cleaning;Milk storage   Initiated by nursing staff   Date Initiated 06/10/24   Patient Follow-Up   Lactation Consult Status 2   Follow-Up Type Inpatient;Call as needed   Other OB Lactation Documentation    Additional Problem Noted Mom is pumping for baby in NICU. Mom is collecting 120 mLs each pumping session. Mom is pumping every 2-3 hours. Breasts are becoming leung and she is engorged. Education on how to help alleivate engorgement. Enc to call for further assistance     Discussed s/s engorgement, blocked milk ducts, and mastitis. Discussed how to remedy at home and when to contact physician. Reviewed engorgement and ways to reduce symptoms. Use a warmth on breasts with massage prior to feeding / pumping. Use massage during pumping over full ducts. Used cold, compression on breasts after feeding/pumping session. Ideas are rice in a sock. Place one in the freezer for cool and one in the microwave for warmth. Referred to discharge book / engorgement page.    Encouraged parents to call for assistance, questions, and concerns about breastfeeding.  Extension provided.

## 2024-06-14 VITALS
RESPIRATION RATE: 16 BRPM | SYSTOLIC BLOOD PRESSURE: 134 MMHG | WEIGHT: 237 LBS | OXYGEN SATURATION: 98 % | BODY MASS INDEX: 39.49 KG/M2 | DIASTOLIC BLOOD PRESSURE: 97 MMHG | HEIGHT: 65 IN | HEART RATE: 75 BPM | TEMPERATURE: 98 F

## 2024-06-14 PROCEDURE — NC001 PR NO CHARGE: Performed by: OBSTETRICS & GYNECOLOGY

## 2024-06-14 PROCEDURE — 99024 POSTOP FOLLOW-UP VISIT: CPT | Performed by: OBSTETRICS & GYNECOLOGY

## 2024-06-14 RX ORDER — ACETAMINOPHEN 325 MG/1
650 TABLET ORAL EVERY 6 HOURS SCHEDULED
Qty: 2 TABLET | Refills: 0 | Status: CANCELLED | OUTPATIENT
Start: 2024-06-14 | End: 2024-06-15

## 2024-06-14 RX ORDER — BENZOCAINE/MENTHOL 6 MG-10 MG
1 LOZENGE MUCOUS MEMBRANE DAILY PRN
Start: 2024-06-14 | End: 2024-06-17

## 2024-06-14 RX ORDER — IBUPROFEN 600 MG/1
600 TABLET ORAL EVERY 6 HOURS PRN
Qty: 30 TABLET | Refills: 0 | Status: SHIPPED | OUTPATIENT
Start: 2024-06-14

## 2024-06-14 RX ORDER — NIFEDIPINE 30 MG/1
30 TABLET, EXTENDED RELEASE ORAL DAILY
Qty: 60 TABLET | Refills: 0 | Status: SHIPPED | OUTPATIENT
Start: 2024-06-14

## 2024-06-14 RX ORDER — OXYCODONE HYDROCHLORIDE 5 MG/1
5 TABLET ORAL EVERY 4 HOURS PRN
Qty: 8 TABLET | Refills: 0 | Status: SHIPPED | OUTPATIENT
Start: 2024-06-14 | End: 2024-06-17

## 2024-06-14 RX ORDER — DOCUSATE SODIUM 100 MG/1
100 CAPSULE, LIQUID FILLED ORAL 2 TIMES DAILY PRN
Qty: 30 CAPSULE | Refills: 0 | Status: SHIPPED | OUTPATIENT
Start: 2024-06-14

## 2024-06-14 RX ADMIN — NIFEDIPINE 30 MG: 30 TABLET, EXTENDED RELEASE ORAL at 08:00

## 2024-06-14 RX ADMIN — ACETAMINOPHEN 325MG 650 MG: 325 TABLET ORAL at 02:06

## 2024-06-14 RX ADMIN — ACETAMINOPHEN 325MG 650 MG: 325 TABLET ORAL at 07:59

## 2024-06-14 RX ADMIN — IBUPROFEN 600 MG: 600 TABLET, FILM COATED ORAL at 04:47

## 2024-06-14 RX ADMIN — DOCUSATE SODIUM 100 MG: 100 CAPSULE, LIQUID FILLED ORAL at 08:00

## 2024-06-14 RX ADMIN — PRENATAL VIT W/ FE FUMARATE-FA TAB 27-0.8 MG 1 TABLET: 27-0.8 TAB at 08:00

## 2024-06-14 RX ADMIN — IBUPROFEN 600 MG: 600 TABLET, FILM COATED ORAL at 10:41

## 2024-06-14 RX ADMIN — LORATADINE 10 MG: 10 TABLET ORAL at 08:00

## 2024-06-14 NOTE — PLAN OF CARE
Problem: PAIN - ADULT  Goal: Verbalizes/displays adequate comfort level or baseline comfort level  Description: Interventions:  - Encourage patient to monitor pain and request assistance  - Assess pain using appropriate pain scale  - Administer analgesics based on type and severity of pain and evaluate response  - Implement non-pharmacological measures as appropriate and evaluate response  - Consider cultural and social influences on pain and pain management  - Notify physician/advanced practitioner if interventions unsuccessful or patient reports new pain  Outcome: Progressing     Problem: INFECTION - ADULT  Goal: Absence or prevention of progression during hospitalization  Description: INTERVENTIONS:  - Assess and monitor for signs and symptoms of infection  - Monitor lab/diagnostic results  - Administer medications as ordered  - Instruct and encourage patient and family to use good hand hygiene technique  - Identify and instruct in appropriate isolation precautions for identified infection/condition  Outcome: Progressing  Goal: Absence of fever/infection during neutropenic period  Description: INTERVENTIONS:  - Monitor WBC    Outcome: Progressing     Problem: SAFETY ADULT  Goal: Patient will remain free of falls  Description: INTERVENTIONS:  - Keep Call bell within reach  - Keep bed low and locked with side rails adjusted as appropriate  - Keep care items and personal belongings within reach  - Initiate and maintain comfort rounds  Outcome: Progressing  Goal: Maintain or return to baseline ADL function  Description: INTERVENTIONS:  - Provide patient education as appropriate  Outcome: Progressing  Goal: Maintains/Returns to pre admission functional level  Description: INTERVENTIONS:  - Record patient progress and toleration of activity level   Outcome: Progressing     Problem: Knowledge Deficit  Goal: Patient/family/caregiver demonstrates understanding of disease process, treatment plan, medications, and  discharge instructions  Description: Complete learning assessment and assess knowledge base.  Interventions:  - Provide teaching at level of understanding  - Provide teaching via preferred learning methods  Outcome: Progressing     Problem: DISCHARGE PLANNING  Goal: Discharge to home or other facility with appropriate resources  Description: INTERVENTIONS:  - Identify barriers to discharge w/patient and caregiver  - Arrange for needed discharge resources and transportation as appropriate  - Identify discharge learning needs (meds, wound care, etc.)  - Arrange for interpretive services to assist at discharge as needed  - Refer to Case Management Department for coordinating discharge planning if the patient needs post-hospital services based on physician/advanced practitioner order or complex needs related to functional status, cognitive ability, or social support system  Outcome: Progressing     Problem: ANTEPARTUM  Goal: Maintain pregnancy as long as maternal and/or fetal condition is stable  Description: INTERVENTIONS:  - Maternal surveillance  - Fetal surveillance  - Monitor uterine activity  - Medications as ordered  - Bedrest  Outcome: Completed     Problem: BIRTH - VAGINAL/ SECTION  Goal: Fetal and maternal status remain reassuring during the birth process  Description: INTERVENTIONS:  - Monitor vital signs  - Monitor fetal heart rate  - Monitor uterine activity  - Monitor labor progression (vaginal delivery)  - DVT prophylaxis  - Antibiotic prophylaxis  Outcome: Completed  Goal: Emotionally satisfying birthing experience for mother/fetus  Description: Interventions:  - Assess, plan, implement and evaluate the nursing care given to the patient in labor  - Advocate the philosophy that each childbirth experience is a unique experience and support the family's chosen level of involvement and control during the labor process   - Actively participate in both the patient's and family's teaching of the birth  process  - Consider cultural, Sabianism and age-specific factors and plan care for the patient in labor  Outcome: Completed     Problem: POSTPARTUM  Goal: Experiences normal postpartum course  Description: INTERVENTIONS:  - Monitor maternal vital signs  - Assess uterine involution and lochia  Outcome: Progressing  Goal: Appropriate maternal -  bonding  Description: INTERVENTIONS:  - Identify family support  - Assess for appropriate maternal/infant bonding   -Encourage maternal/infant bonding opportunities  - Referral to  or  as needed  Outcome: Progressing  Goal: Establishment of infant feeding pattern  Description: INTERVENTIONS:  - Assess breast/bottle feeding  - Refer to lactation as needed  Outcome: Progressing  Goal: Incision(s), wounds(s) or drain site(s) healing without S/S of infection  Description: INTERVENTIONS  - Assess and document dressing, incision, wound bed, drain sites and surrounding tissue  - Provide patient and family education  - Perform skin care/dressing changes every day  Outcome: Progressing

## 2024-06-14 NOTE — PROGRESS NOTES
Progress Note - OB/GYN   Delphine Carney 29 y.o. female MRN: [id number]  Unit/Bed#: -01 Encounter: [id number]      Assessment/Plan    Delphine Carney is a 29 y.o.  who is PPD 4 s/p LTCS at 37w0d     History of postpartum hemorrhage  Assessment & Plan  T&C 2U    Sterilization education  Assessment & Plan  S/p BS with time of RLTCS    Preeclampsia  Assessment & Plan  CBC and CMP WNL  Continue to monitor Bps  Continue Procardia 30 mg XL daily    Systolic (24hrs), Av , Min:129 , Max:155   Diastolic (24hrs), Av, Min:69, Max:90        Asthma  Assessment & Plan  Continue albuterol PRN  Avoid hemabate    * S/P repeat low transverse   Assessment & Plan  QBL: 78ml, pre op Hgb 12.7 --> post op Hgb 10.3  Voiding: spontaneously  DVT ppx: SCD's and Lovenox QD  Pain: well-controlled w/oral analgesics Ibuprofen/Tylenol/Hilary  FEN: Tolerating regular diet  Routine postpartum care           Disposition: Anticipate discharge home postpartum Day #4  Barriers to discharge: none      Subjective/Objective     Subjective: Postpartum state    Pain: no  Tolerating PO: yes  Voiding: yes  Flatus: yes  BM: yes  Ambulating: yes  Breastfeeding: Using breast pump  Chest pain: no  Shortness of breath: no  Leg pain: no  Lochia: minimal    Objective:     Vitals:  Vitals:    24 1100 24 1500 24 2201 24 0300   BP: 143/82 141/79 135/91 126/83   BP Location: Right arm Right arm  Right arm   Pulse: 78 (!) 122 85 83   Resp: 18 20  16   Temp: 97.7 °F (36.5 °C) 97.8 °F (36.6 °C)  97.6 °F (36.4 °C)   TempSrc: Temporal Temporal  Temporal   SpO2: 97% 100%  98%   Weight:       Height:           Physical Exam:   GEN: appears well, alert and oriented x 3, pleasant and cooperative   CV: Regular rate and rhythm  RESP: non labored breathing, lungs clear to auscultation  ABDOMEN: soft, no tenderness, no distention, Uterine fundus firm and non-tender, -1 cm below the umbilicus, incision c/d/i  EXTREMITIES:  non-tender  NEURO Alert and oriented to person, place, and time.       Lab Results   Component Value Date    WBC 8.39 06/11/2024    HGB 10.3 (L) 06/11/2024    HCT 31.0 (L) 06/11/2024    MCV 95 06/11/2024     06/11/2024         Kendy Araya MD  Obstetrics & Gynecology  06/14/24

## 2024-06-14 NOTE — NURSING NOTE
Maternal discharge education completed with patient. Patient verbalized understanding and had no questions at this time. Encouraged by nursing staff to call if any questions arise before discharge. Discharge materials given and reviewed.

## 2024-06-14 NOTE — PROGRESS NOTES
All belongings accounted for by patient and S.O. before leaving. Discharge instructions given and reviewed, questions answered. Patient chose to walk from unit escorted by S.O. and escorted by EMEKA Dobbins

## 2024-06-14 NOTE — PLAN OF CARE
Problem: PAIN - ADULT  Goal: Verbalizes/displays adequate comfort level or baseline comfort level  Description: Interventions:  - Encourage patient to monitor pain and request assistance  - Assess pain using appropriate pain scale  - Administer analgesics based on type and severity of pain and evaluate response  - Implement non-pharmacological measures as appropriate and evaluate response  - Consider cultural and social influences on pain and pain management  - Notify physician/advanced practitioner if interventions unsuccessful or patient reports new pain  Outcome: Progressing     Problem: INFECTION - ADULT  Goal: Absence or prevention of progression during hospitalization  Description: INTERVENTIONS:  - Assess and monitor for signs and symptoms of infection  - Monitor lab/diagnostic results  - Administer medications as ordered  - Instruct and encourage patient and family to use good hand hygiene technique  - Identify and instruct in appropriate isolation precautions for identified infection/condition  Outcome: Progressing  Goal: Absence of fever/infection during neutropenic period  Description: INTERVENTIONS:  - Monitor WBC    Outcome: Progressing     Problem: SAFETY ADULT  Goal: Patient will remain free of falls  Description: INTERVENTIONS:  - Keep Call bell within reach  - Keep bed low and locked with side rails adjusted as appropriate  - Keep care items and personal belongings within reach  - Initiate and maintain comfort rounds  Outcome: Progressing  Goal: Maintain or return to baseline ADL function  Description: INTERVENTIONS:  - Provide patient education as appropriate  Outcome: Progressing  Goal: Maintains/Returns to pre admission functional level  Description: INTERVENTIONS:  - Record patient progress and toleration of activity level   Outcome: Progressing     Problem: Knowledge Deficit  Goal: Patient/family/caregiver demonstrates understanding of disease process, treatment plan, medications, and  discharge instructions  Description: Complete learning assessment and assess knowledge base.  Interventions:  - Provide teaching at level of understanding  - Provide teaching via preferred learning methods  Outcome: Progressing     Problem: DISCHARGE PLANNING  Goal: Discharge to home or other facility with appropriate resources  Description: INTERVENTIONS:  - Identify barriers to discharge w/patient and caregiver  - Arrange for needed discharge resources and transportation as appropriate  - Identify discharge learning needs (meds, wound care, etc.)  - Arrange for interpretive services to assist at discharge as needed  - Refer to Case Management Department for coordinating discharge planning if the patient needs post-hospital services based on physician/advanced practitioner order or complex needs related to functional status, cognitive ability, or social support system  Outcome: Progressing     Problem: ANTEPARTUM  Goal: Maintain pregnancy as long as maternal and/or fetal condition is stable  Description: INTERVENTIONS:  - Maternal surveillance  - Fetal surveillance  - Monitor uterine activity  - Medications as ordered  - Bedrest  Outcome: Progressing     Problem: BIRTH - VAGINAL/ SECTION  Goal: Fetal and maternal status remain reassuring during the birth process  Description: INTERVENTIONS:  - Monitor vital signs  - Monitor fetal heart rate  - Monitor uterine activity  - Monitor labor progression (vaginal delivery)  - DVT prophylaxis  - Antibiotic prophylaxis  Outcome: Progressing  Goal: Emotionally satisfying birthing experience for mother/fetus  Description: Interventions:  - Assess, plan, implement and evaluate the nursing care given to the patient in labor  - Advocate the philosophy that each childbirth experience is a unique experience and support the family's chosen level of involvement and control during the labor process   - Actively participate in both the patient's and family's teaching of the  birth process  - Consider cultural, Holiness and age-specific factors and plan care for the patient in labor  Outcome: Progressing     Problem: POSTPARTUM  Goal: Experiences normal postpartum course  Description: INTERVENTIONS:  - Monitor maternal vital signs  - Assess uterine involution and lochia  Outcome: Progressing  Goal: Appropriate maternal -  bonding  Description: INTERVENTIONS:  - Identify family support  - Assess for appropriate maternal/infant bonding   -Encourage maternal/infant bonding opportunities  - Referral to  or  as needed  Outcome: Progressing  Goal: Establishment of infant feeding pattern  Description: INTERVENTIONS:  - Assess breast/bottle feeding  - Refer to lactation as needed  Outcome: Progressing  Goal: Incision(s), wounds(s) or drain site(s) healing without S/S of infection  Description: INTERVENTIONS  - Assess and document dressing, incision, wound bed, drain sites and surrounding tissue  - Provide patient and family education  - Perform skin care/dressing changes every  Outcome: Progressing

## 2024-06-17 ENCOUNTER — POSTPARTUM VISIT (OUTPATIENT)
Dept: OBGYN CLINIC | Facility: CLINIC | Age: 29
End: 2024-06-17

## 2024-06-17 VITALS
WEIGHT: 232 LBS | HEIGHT: 65 IN | BODY MASS INDEX: 38.65 KG/M2 | HEART RATE: 89 BPM | SYSTOLIC BLOOD PRESSURE: 142 MMHG | DIASTOLIC BLOOD PRESSURE: 80 MMHG | OXYGEN SATURATION: 97 %

## 2024-06-17 DIAGNOSIS — F41.8 POSTPARTUM ANXIETY: ICD-10-CM

## 2024-06-17 DIAGNOSIS — O14.90 PRE-ECLAMPSIA, ANTEPARTUM: ICD-10-CM

## 2024-06-17 PROBLEM — Z30.09 STERILIZATION EDUCATION: Status: RESOLVED | Noted: 2024-06-06 | Resolved: 2024-06-17

## 2024-06-17 PROBLEM — J45.909 ASTHMA AFFECTING PREGNANCY IN THIRD TRIMESTER: Status: RESOLVED | Noted: 2024-02-15 | Resolved: 2024-06-17

## 2024-06-17 PROBLEM — Z98.891 S/P REPEAT LOW TRANSVERSE C-SECTION: Status: RESOLVED | Noted: 2024-01-29 | Resolved: 2024-06-17

## 2024-06-17 PROBLEM — Z87.59 HISTORY OF GESTATIONAL HYPERTENSION: Status: RESOLVED | Noted: 2024-01-30 | Resolved: 2024-06-17

## 2024-06-17 PROBLEM — O09.93 SUPERVISION OF HIGH RISK PREGNANCY IN THIRD TRIMESTER: Status: RESOLVED | Noted: 2024-01-30 | Resolved: 2024-06-17

## 2024-06-17 PROBLEM — O99.513 ASTHMA AFFECTING PREGNANCY IN THIRD TRIMESTER: Status: RESOLVED | Noted: 2024-02-15 | Resolved: 2024-06-17

## 2024-06-17 PROBLEM — Z87.59 HISTORY OF POSTPARTUM HEMORRHAGE: Status: RESOLVED | Noted: 2024-06-09 | Resolved: 2024-06-17

## 2024-06-17 PROBLEM — O34.219 HISTORY OF CESAREAN DELIVERY, ANTEPARTUM: Status: RESOLVED | Noted: 2024-02-15 | Resolved: 2024-06-17

## 2024-06-17 PROCEDURE — PNV: Performed by: OBSTETRICS & GYNECOLOGY

## 2024-06-17 NOTE — PROGRESS NOTES
Patient ID: Delphine Carney is a 29 y.o. female.    Chief Complaint   Patient presents with    Postpartum Care         She presents for routine postpartum visit.   She is now a  who is 1wks s/p repeat C/S and BS on 6/10/24 - 37 weeks for preeclampsia without severe features .    Did require initiation of medication - procardia XL 30 mg daily    Since d/c home she has had no complaints.   She denies abn bleeding, pelvic pain, breast complaints, bowel/bladder dysfunction  Baby BOY was in NICU due to pneumothorax - is feeding via pumped breast milk   [place]  Depression Scale Total: 16  Plans for contraception: s/p bilateral salpingectomy     Does report a lot of anxiety lately due to baby's NICU course. He will hopefully be discharged within the next few days, chest tube removed today, taking bottles, undergoing phototherapy  Denies SI/HI  She was on zoloft postpartum last pregnancy which helped   She has not been taking her BP's at home but is taking her medication as prescribed     Pap 2023 NILM      The following portions of the patient's history were reviewed and updated as appropriate: allergies, current medications, past family history, past medical history, past social history, past surgical history, and problem list.    Review of Systems   Constitutional:  Negative for appetite change, chills and fever.   Eyes:  Negative for visual disturbance.   Respiratory:  Negative for cough, chest tightness and shortness of breath.    Cardiovascular:  Negative for chest pain.   Gastrointestinal:  Negative for abdominal distention, abdominal pain, constipation, diarrhea, nausea and vomiting.   Endocrine: Negative for cold intolerance and heat intolerance.   Genitourinary:  Negative for difficulty urinating, dyspareunia, dysuria, frequency, genital sores, pelvic pain, urgency, vaginal bleeding, vaginal discharge and vaginal pain.   Musculoskeletal:  Negative for arthralgias.   Neurological:   "Negative for light-headedness and headaches.   Hematological:  Does not bruise/bleed easily.   Psychiatric/Behavioral:  Negative for behavioral problems. The patient is nervous/anxious.    All other systems reviewed and are negative.               Objective:  /80   Pulse 89   Ht 5' 5\" (1.651 m)   Wt 105 kg (232 lb)   SpO2 97%   BMI 38.61 kg/m²     Physical Exam  Constitutional:       General: She is not in acute distress.     Appearance: Normal appearance.   HENT:      Head: Normocephalic and atraumatic.   Cardiovascular:      Rate and Rhythm: Normal rate.   Pulmonary:      Effort: Pulmonary effort is normal. No respiratory distress.   Abdominal:      General: A surgical scar is present. There is no distension.      Palpations: Abdomen is soft.      Tenderness: There is no abdominal tenderness. There is no guarding or rebound.      Comments: Incision C/D/I    Neurological:      General: No focal deficit present.      Mental Status: She is alert.   Psychiatric:         Mood and Affect: Mood normal.         Behavior: Behavior normal.   Vitals and nursing note reviewed.                   Postpartum Depression: High Risk (2024)    [place]  Depression Scale     Last EPDS Total Score: 16     Last EPDS Self Harm Result: Never         Assessment/Plan:    Problem List Items Addressed This Visit       Preeclampsia     She has not been taking her BP at home. Strongly advised to do so. No current concerning sx. She will message via "[employer] with more up to date values to determine if change in medication is needed. Strict precautions reviewed          Encounter for postpartum visit - Primary     1 wks PostPartum.  Normal postpartum exam.   The patient may advance activity as tolerated and may resume sexual activity at 6 wks PP.  Contraception discussed. Patient s/p bilateral salpingectomy   EDPS score 16. Signs and symptoms of postpartum depression reviewed. See discussion about postpartum anxiety   Up " to date on pap smears  Will f/u in 4 weeks for exam  Precautions reviewed           Postpartum anxiety     She had a good result with zoloft previously. Reviewed safety in breastfeeding. Rx for 50 mg sent. Precautions reviewed          Relevant Medications    sertraline (Zoloft) 50 mg tablet

## 2024-06-17 NOTE — ASSESSMENT & PLAN NOTE
She has not been taking her BP at home. Strongly advised to do so. No current concerning sx. She will message via [employer] with more up to date values to determine if change in medication is needed. Strict precautions reviewed

## 2024-06-17 NOTE — UTILIZATION REVIEW
NOTIFICATION OF ADMISSION DISCHARGE   This is a Notification of Discharge from [facility]. Please be advised that this patient has been discharge from our facility. Below you will find the admission and discharge date and time including the patient’s disposition.   UTILIZATION REVIEW CONTACT:  Jaye العراقي  Utilization   Network Utilization Review Department  Phone: [phone] x carefully listen to the prompts. All voicemails are confidential.  Email: [email or web address]     ADMISSION INFORMATION  PRESENTATION DATE: 6/10/2024 11:00 AM  OBERVATION ADMISSION DATE:   INPATIENT ADMISSION DATE: 6/10/24 11:29 AM   DISCHARGE DATE: 6/14/2024  1:42 PM   DISPOSITION:Home/Self Care    Network Utilization Review Department  ATTENTION: Please call with any questions or concerns to [phone] and carefully listen to the prompts so that you are directed to the right person. All voicemails are confidential.   For Discharge needs, contact Care Management DC Support Team at [phone] opt. 2  Send all requests for admission clinical reviews, approved or denied determinations and any other requests to dedicated fax number below belonging to the campus where the patient is receiving treatment. List of dedicated fax numbers for the Facilities:  FACILITY NAME UR FAX NUMBER   ADMISSION DENIALS (Administrative/Medical Necessity) [phone]   DISCHARGE SUPPORT TEAM ([facility]) [phone]   PARENT CHILD HEALTH (Maternity/NICU/Pediatrics) [phone]   [facility] [phone]   [facility] [phone]   [facility] [phone]   [facility] [phone]   [facility] [phone]   [facility] [phone]   [facility] [phone]   [facility] [phone]   [facility]  [facility] [phone]   [facility] [phone]   [facility] [phone]   [facility] [phone]

## 2024-06-17 NOTE — ASSESSMENT & PLAN NOTE
She had a good result with zoloft previously. Reviewed safety in breastfeeding. Rx for 50 mg sent. Precautions reviewed

## 2024-06-17 NOTE — ASSESSMENT & PLAN NOTE
1 wks PostPartum.  Normal postpartum exam.   The patient may advance activity as tolerated and may resume sexual activity at 6 wks PP.  Contraception discussed. Patient s/p bilateral salpingectomy   EDPS score 16. Signs and symptoms of postpartum depression reviewed. See discussion about postpartum anxiety   Up to date on pap smears  Will f/u in 4 weeks for exam  Precautions reviewed

## 2024-07-01 ENCOUNTER — APPOINTMENT (EMERGENCY)
Dept: ULTRASOUND IMAGING | Facility: HOSPITAL | Age: 29
End: 2024-07-01
Payer: COMMERCIAL

## 2024-07-01 ENCOUNTER — HOSPITAL ENCOUNTER (EMERGENCY)
Facility: HOSPITAL | Age: 29
Discharge: HOME | End: 2024-07-01
Attending: EMERGENCY MEDICINE
Payer: COMMERCIAL

## 2024-07-01 ENCOUNTER — NURSE TRIAGE (OUTPATIENT)
Age: 29
End: 2024-07-01

## 2024-07-01 VITALS
HEART RATE: 63 BPM | OXYGEN SATURATION: 96 % | WEIGHT: 223.99 LBS | DIASTOLIC BLOOD PRESSURE: 51 MMHG | SYSTOLIC BLOOD PRESSURE: 106 MMHG | RESPIRATION RATE: 20 BRPM | TEMPERATURE: 97.5 F | BODY MASS INDEX: 37.27 KG/M2

## 2024-07-01 DIAGNOSIS — N93.9 VAGINAL BLEEDING: Primary | ICD-10-CM

## 2024-07-01 LAB
ALBUMIN SERPL BCG-MCNC: 4.4 G/DL (ref 3.5–5)
ALP SERPL-CCNC: 78 U/L (ref 34–104)
ALT SERPL W P-5'-P-CCNC: 15 U/L (ref 7–52)
ANION GAP SERPL CALCULATED.3IONS-SCNC: 7 MMOL/L (ref 4–13)
AST SERPL W P-5'-P-CCNC: 12 U/L (ref 13–39)
BASOPHILS # BLD AUTO: 0.03 THOUSANDS/ÂΜL (ref 0–0.1)
BASOPHILS NFR BLD AUTO: 1 % (ref 0–1)
BILIRUB SERPL-MCNC: 0.26 MG/DL (ref 0.2–1)
BUN SERPL-MCNC: 17 MG/DL (ref 5–25)
CALCIUM SERPL-MCNC: 9.4 MG/DL (ref 8.4–10.2)
CHLORIDE SERPL-SCNC: 103 MMOL/L (ref 96–108)
CO2 SERPL-SCNC: 27 MMOL/L (ref 21–32)
CREAT SERPL-MCNC: 0.68 MG/DL (ref 0.6–1.3)
EOSINOPHIL # BLD AUTO: 0.17 THOUSAND/ÂΜL (ref 0–0.61)
EOSINOPHIL NFR BLD AUTO: 3 % (ref 0–6)
ERYTHROCYTE [DISTWIDTH] IN BLOOD BY AUTOMATED COUNT: 11.9 % (ref 11.6–15.1)
GFR SERPL CREATININE-BSD FRML MDRD: 119 ML/MIN/1.73SQ M
GLUCOSE SERPL-MCNC: 65 MG/DL (ref 65–140)
HCT VFR BLD AUTO: 40 % (ref 34.8–46.1)
HGB BLD-MCNC: 13.4 G/DL (ref 11.5–15.4)
IMM GRANULOCYTES # BLD AUTO: 0.01 THOUSAND/UL (ref 0–0.2)
IMM GRANULOCYTES NFR BLD AUTO: 0 % (ref 0–2)
LYMPHOCYTES # BLD AUTO: 1.88 THOUSANDS/ÂΜL (ref 0.6–4.47)
LYMPHOCYTES NFR BLD AUTO: 29 % (ref 14–44)
MCH RBC QN AUTO: 30.6 PG (ref 26.8–34.3)
MCHC RBC AUTO-ENTMCNC: 33.5 G/DL (ref 31.4–37.4)
MCV RBC AUTO: 91 FL (ref 82–98)
MONOCYTES # BLD AUTO: 0.38 THOUSAND/ÂΜL (ref 0.17–1.22)
MONOCYTES NFR BLD AUTO: 6 % (ref 4–12)
NEUTROPHILS # BLD AUTO: 4.07 THOUSANDS/ÂΜL (ref 1.85–7.62)
NEUTS SEG NFR BLD AUTO: 61 % (ref 43–75)
NRBC BLD AUTO-RTO: 0 /100 WBCS
PLATELET # BLD AUTO: 322 THOUSANDS/UL (ref 149–390)
PMV BLD AUTO: 9.5 FL (ref 8.9–12.7)
POTASSIUM SERPL-SCNC: 4.1 MMOL/L (ref 3.5–5.3)
PROT SERPL-MCNC: 7.4 G/DL (ref 6.4–8.4)
RBC # BLD AUTO: 4.38 MILLION/UL (ref 3.81–5.12)
SODIUM SERPL-SCNC: 137 MMOL/L (ref 135–147)
WBC # BLD AUTO: 6.54 THOUSAND/UL (ref 4.31–10.16)

## 2024-07-01 PROCEDURE — 99284 EMERGENCY DEPT VISIT MOD MDM: CPT | Performed by: EMERGENCY MEDICINE

## 2024-07-01 PROCEDURE — 85025 COMPLETE CBC W/AUTO DIFF WBC: CPT | Performed by: EMERGENCY MEDICINE

## 2024-07-01 PROCEDURE — NC001 PR NO CHARGE: Performed by: OBSTETRICS & GYNECOLOGY

## 2024-07-01 PROCEDURE — 76830 TRANSVAGINAL US NON-OB: CPT

## 2024-07-01 PROCEDURE — 36415 COLL VENOUS BLD VENIPUNCTURE: CPT | Performed by: EMERGENCY MEDICINE

## 2024-07-01 PROCEDURE — 96360 HYDRATION IV INFUSION INIT: CPT

## 2024-07-01 PROCEDURE — 96361 HYDRATE IV INFUSION ADD-ON: CPT

## 2024-07-01 PROCEDURE — 99284 EMERGENCY DEPT VISIT MOD MDM: CPT

## 2024-07-01 PROCEDURE — 80053 COMPREHEN METABOLIC PANEL: CPT | Performed by: EMERGENCY MEDICINE

## 2024-07-01 PROCEDURE — 76856 US EXAM PELVIC COMPLETE: CPT

## 2024-07-01 RX ADMIN — SODIUM CHLORIDE 1000 ML: 0.9 INJECTION, SOLUTION INTRAVENOUS at 18:15

## 2024-07-01 NOTE — ASSESSMENT & PLAN NOTE
Delphine is 21 days postpartum from scheduled RLTCS delivery, reports bleeding ceased on 6/21 and returned today with large gushes and passage of clots.  On exam minimal bleeding from cervical os; presence of small amount of mucous vs membranes visible in cervical os with oozing around  Hgb 13.4, WBC 6.5, pt remains afebrile  Vital signs wnl  Transvaginal ultrasound without evidence of retained products of conception  Recommend patient continues to monitor bleeding; bleeding improving in the ED  Recommend outpatient follow up in the next week to assess symptoms

## 2024-07-01 NOTE — TELEPHONE ENCOUNTER
"Delphine delivered on 6/10/2024. Bleeding had stopped on 6/21/2024.    Sudden onset of dark red bleeding when stood up today, soaked through Always maxi pad #3 in when stood up.  Had been on toilet for over 1/2 hour with bleeding alternating between stream and dripping.  C/O increased moderate to severe pelvic pain-worse than cramps since onset of bleeding.     Currently nursing-no hormone therapy. Recommended ibuprofen 600mg    Will review with on call provider and call back with recommendation.    Secure chat message to complete women's care on call provider with return message from Dr. Porter to be evaluated.    Delphine notified to proceed to [place] ED and is in agreement.   Reason for Disposition   SEVERE vaginal bleeding (e.g., soaking 2 pads or tampons per hour) and present 2 or more hours    Answer Assessment - Initial Assessment Questions  1. ONSET: \"Describe your bleeding: is it getting worse, staying the same, improving, or stopping and starting?\"12:30 pm today, bleeding, pelvic pain getting worse  2. AMOUNT: \"How much bleeding are you having today?\"      - SPOTTING: spotting, or pinkish / brownish mucous discharge; does not fill panti-liner or pad     - MILD:  less than 1 pad / hour; less than patient's usual menstrual bleeding    - MODERATE: 1-2 pads / hour; 1 menstrual cup every 6 hours; small-medium blood clots (e.g., pea, grape, small coin)    - SEVERE: soaking 2 or more pads/hour for 2 or more hours; 1 menstrual cup every 2 hours; bleeding not contained by pads or continuous red blood from vagina; large blood clots (e.g., golf ball, large coin)       Bled through super pad when stood up, had to sit on toilet blood alternating between constant stream and drip. Changed pad 10 minutes ago, can feel blood coming. Unsure if passing clots. Could not see in toilet due to blood  3. ABDOMINAL PAIN: \"Do you have any pain?\" \"How bad is the pain?\" \"What does it keep you from doing?\"      - MILD -  doesn't " "interfere with normal activities, abdomen soft and not tender to touch      - MODERATE - interferes with normal activities or awakens from sleep, tender to touch      - SEVERE - excruciating pain, doubled over, unable to do any normal activities        Moderate to severe pelvic pain, worse than period cramps  4. HORMONES: \"Are you taking any birth control medications?\" (e.g., birth control pills, Depo-Provera)      denies  5. BLOOD THINNERS: \"Do you take any blood thinners?\" (e.g., coumadin, aspirin)      denies  6. OTHER SYMPTOMS: \"Do you have any other symptoms?\" (e.g., fever, chills, dizziness)      denies  7. DELIVERY DATE: \"When was your delivery date?\" \"Vaginal delivery or ?\"      6/10/2024  8. BREASTFEEDING: \"Are you breastfeeding?\"      Yes    Protocols used: Postpartum - Vaginal Bleeding and Lochia-ADULT-[place]    "

## 2024-07-01 NOTE — CONSULTS
Consult- Obstetrics  Delphine Carney 29 y.o. female MRN: [id number]  Unit/Bed#: ED-03 Encounter: [id number]        Assessment/Plan      Vaginal bleeding  Assessment & Plan  Delphine is 21 days postpartum from scheduled RLTCS delivery, reports bleeding ceased on  and returned today with large gushes and passage of clots.  On exam minimal bleeding from cervical os; presence of small amount of mucous vs membranes visible in cervical os with oozing around  Hgb 13.4, WBC 6.5, pt remains afebrile  Vital signs wnl  Transvaginal ultrasound without evidence of retained products of conception  Recommend patient continues to monitor bleeding; bleeding improving in the ED  Recommend outpatient follow up in the next week to assess symptoms           History of Present Illness:  HPI: Delphine Carney is a 29 y.o.  female who presents with vaginal bleeding on postpartum day 21 from RLTCS. She reports that her bleeding had stopped 10 days ago and she was feeling well recovering at home. Today, with no inciting incident, she reported several large gushes of blood and passage of clots. She soaked through several large pads in the course of several hours. She additionally had pelvic cramping that felt similar to menstrual cramps, for which she is taking ibuprofen. She had a mild headache two days ago which she has not taken any medication for. She continues on her Procardia 30 mg XL daily. All other review of symptoms are negative.      Historical Information   Past Medical History:   Diagnosis Date    Asthma     Hip instability     History of gestational hypertension 2024    Dx at 31 wks. Weighed 70 lbs more at that point. Had medical weight loss       History of postpartum hemorrhage 2024     Past Surgical History:   Procedure Laterality Date     SECTION      MO  DELIVERY ONLY N/A 6/10/2024    Procedure:  SECTION () REPEAT;  Surgeon: Kaila Porter MD;  Location: [facility]  ;  Service: Obstetrics    TUBAL LIGATION N/A 6/10/2024    Procedure: LIGATION/COAGULATION TUBAL;  Surgeon: Kaila Porter MD;  Location: [facility];  Service: Obstetrics     OB History    Para Term  AB Living   2 2 2     2   SAB IAB Ectopic Multiple Live Births         0 2      # Outcome Date GA Lbr Lee/2nd Weight Sex Type Anes PTL Lv   2 Term 06/10/24 37w0d  3300 g (7 lb 4.4 oz) M CS-LTranv   MORGAN   1 Term 20 38w2d  3390 g (7 lb 7.6 oz) M CS-LTranv Spinal  MORGAN     Family History   Problem Relation Age of Onset    Allergy (severe) Mother         Mold    Deep vein thrombosis Father 40        after surgery    Seizures Sister     Thyroid disease Sister     Thyroid cancer Sister     Pulmonary embolism Neg Hx     Stroke Neg Hx     Heart attack Neg Hx     Diabetes Neg Hx      Social History   Social History     Substance and Sexual Activity   Alcohol Use Not Currently     Social History     Substance and Sexual Activity   Drug Use Never     Social History     Tobacco Use   Smoking Status Never   Smokeless Tobacco Never     Allergies   Allergen Reactions    Nuts - Food Allergy Throat Swelling     Almonds, hazelnuts and peanuts    Other Itching     Seasonal pollin allergies    Soybean-Containing Drug Products - Food Allergy GI Intolerance    Apple - Food Allergy     Shellfish Allergy - Food Allergy     Short Ragweed Pollen Ext     Tree Extract     Uncaria Tomentosa (Cats Claw) Allergic Rhinitis       Objective:  Vitals: Blood pressure 106/51, pulse 63, temperature 97.5 °F (36.4 °C), temperature source Oral, resp. rate 20, weight 102 kg (223 lb 15.8 oz), SpO2 96%, currently breastfeeding. Body mass index is 37.27 kg/m².    Invasive Devices       None                   Physical Exam  Constitutional:       Appearance: Normal appearance. She is not ill-appearing.   Eyes:      Extraocular Movements: Extraocular movements intact.   Cardiovascular:      Rate and Rhythm: Normal rate.      Pulses: Normal pulses.    Pulmonary:      Effort: Pulmonary effort is normal.   Abdominal:      General: Abdomen is flat. There is no distension.      Palpations: Abdomen is soft.      Tenderness: There is no abdominal tenderness. There is no guarding.      Comments: Incision clean, dry, intact, uterine fundus firm and well below umbilicus   Genitourinary:     Comments: On speculum exam mild amount of bleeding in vaginal vault, cervical mucous vs retained membranes present in cervix proximal to external cervical os  Skin:     General: Skin is warm and dry.   Neurological:      General: No focal deficit present.      Mental Status: She is alert and oriented to person, place, and time. Mental status is at baseline.   Psychiatric:         Mood and Affect: Mood normal.         Behavior: Behavior normal.         Lab Results:   Recent Results (from the past 24 hour(s))   CBC and differential    Collection Time: 07/01/24  6:17 PM   Result Value Ref Range    WBC 6.54 4.31 - 10.16 Thousand/uL    RBC 4.38 3.81 - 5.12 Million/uL    Hemoglobin 13.4 11.5 - 15.4 g/dL    Hematocrit 40.0 34.8 - 46.1 %    MCV 91 82 - 98 fL    MCH 30.6 26.8 - 34.3 pg    MCHC 33.5 31.4 - 37.4 g/dL    RDW 11.9 11.6 - 15.1 %    MPV 9.5 8.9 - 12.7 fL    Platelets 322 149 - 390 Thousands/uL    nRBC 0 /100 WBCs    Segmented % 61 43 - 75 %    Immature Grans % 0 0 - 2 %    Lymphocytes % 29 14 - 44 %    Monocytes % 6 4 - 12 %    Eosinophils Relative 3 0 - 6 %    Basophils Relative 1 0 - 1 %    Absolute Neutrophils 4.07 1.85 - 7.62 Thousands/µL    Absolute Immature Grans 0.01 0.00 - 0.20 Thousand/uL    Absolute Lymphocytes 1.88 0.60 - 4.47 Thousands/µL    Absolute Monocytes 0.38 0.17 - 1.22 Thousand/µL    Eosinophils Absolute 0.17 0.00 - 0.61 Thousand/µL    Basophils Absolute 0.03 0.00 - 0.10 Thousands/µL   Comprehensive metabolic panel    Collection Time: 07/01/24  6:17 PM   Result Value Ref Range    Sodium 137 135 - 147 mmol/L    Potassium 4.1 3.5 - 5.3 mmol/L    Chloride 103 96 -  108 mmol/L    CO2 27 21 - 32 mmol/L    ANION GAP 7 4 - 13 mmol/L    BUN 17 5 - 25 mg/dL    Creatinine 0.68 0.60 - 1.30 mg/dL    Glucose 65 65 - 140 mg/dL    Calcium 9.4 8.4 - 10.2 mg/dL    AST 12 (L) 13 - 39 U/L    ALT 15 7 - 52 U/L    Alkaline Phosphatase 78 34 - 104 U/L    Total Protein 7.4 6.4 - 8.4 g/dL    Albumin 4.4 3.5 - 5.0 g/dL    Total Bilirubin 0.26 0.20 - 1.00 mg/dL    eGFR 119 ml/min/1.73sq        Kendy Araya MD  7/2/2024  12:01 AM

## 2024-07-01 NOTE — TELEPHONE ENCOUNTER
Regarding: Heavy  Bleeding  ----- Message from Heike BLANKENSHIP sent at 7/1/2024  2:15 PM EDT -----  Pt  is 3 weeks Postpartum  and bleeding though pad  while standing

## 2024-07-02 NOTE — ED PROVIDER NOTES
Pt Name: Delphine Carney  MRN: [id number]  Birthdate 1995  Age/Sex: 29 y.o. female  Date of evaluation: 2024  PCP: Jacob Munguia DO    CHIEF COMPLAINT    Chief Complaint   Patient presents with    Vaginal Bleeding     Pt reports she is 3 weeks postpartum was told by OB to come into the ER for heavy vaginal bleeding. Reports going through a pad every hour. C/o pelvic cramping.          HPI    Delphine presents to the Emergency Department complaining of heavy vaginal bleeding.  She is 3 weeks post partum.  She did not have a lot of bleeding following her  and today had large gushes of blood and was soaking pads.  She called OB and was told to come here for further evaluation.       HPI      Past Medical and Surgical History    Past Medical History:   Diagnosis Date    Asthma     Hip instability     History of gestational hypertension 2024    Dx at 31 wks. Weighed 70 lbs more at that point. Had medical weight loss       History of postpartum hemorrhage 2024       Past Surgical History:   Procedure Laterality Date     SECTION      MO  DELIVERY ONLY N/A 6/10/2024    Procedure:  SECTION () REPEAT;  Surgeon: Kaila Porter MD;  Location: [facility];  Service: Obstetrics    TUBAL LIGATION N/A 6/10/2024    Procedure: LIGATION/COAGULATION TUBAL;  Surgeon: Kaila Porter MD;  Location: [facility];  Service: Obstetrics       Family History   Problem Relation Age of Onset    Allergy (severe) Mother         Mold    Deep vein thrombosis Father 40        after surgery    Seizures Sister     Thyroid disease Sister     Thyroid cancer Sister     Pulmonary embolism Neg Hx     Stroke Neg Hx     Heart attack Neg Hx     Diabetes Neg Hx        Social History     Tobacco Use    Smoking status: Never    Smokeless tobacco: Never   Vaping Use    Vaping status: Never Used   Substance Use Topics    Alcohol use: Not Currently    Drug use: Never          .    Allergies    Allergies   Allergen Reactions    Nuts - Food Allergy Throat Swelling     Almonds, hazelnuts and peanuts    Other Itching     Seasonal pollin allergies    Soybean-Containing Drug Products - Food Allergy GI Intolerance    Apple - Food Allergy     Shellfish Allergy - Food Allergy     Short Ragweed Pollen Ext     Tree Extract     Uncaria Tomentosa (Cats Claw) Allergic Rhinitis       Home Medications    Prior to Admission medications    Medication Sig Start Date End Date Taking? Authorizing Provider   albuterol (PROVENTIL HFA,VENTOLIN HFA) 90 mcg/act inhaler Inhale 2 puffs every 6 (six) hours as needed    Historical Provider, MD   benzocaine-menthol-lanolin-aloe (DERMOPLAST) 20-0.5 % topical spray Apply 1 Application topically every 6 (six) hours as needed for mild pain 6/14/24   Kaila Porter MD   Blood Pressure Monitoring (Blood Pressure Cuff) MISC Use in the morning 5/10/24   Kaila Porter MD   cetirizine (ZyrTEC) 10 mg tablet Take 10 mg by mouth daily    Historical Provider, MD   docusate sodium (COLACE) 100 mg capsule Take 1 capsule (100 mg total) by mouth 2 (two) times a day as needed for constipation 6/14/24   Kaila Porter MD   EPINEPHrine (EPIPEN) 0.3 mg/0.3 mL SOAJ Inject 0.3 mL (0.3 mg total) into a muscle once for 1 dose 7/9/19 6/28/23  Mathew Javier DO   fluticasone (FLONASE) 50 mcg/act nasal spray 1 spray into each nostril daily    Historical Provider, MD   ibuprofen (MOTRIN) 600 mg tablet Take 1 tablet (600 mg total) by mouth every 6 (six) hours as needed for mild pain 6/14/24   Kaila Porter MD   NIFEdipine (PROCARDIA XL) 30 mg 24 hr tablet Take 1 tablet (30 mg total) by mouth daily 6/14/24   Kaila Porter MD   Prenatal Vit-Fe Fumarate-FA (PRENATAL 1 PLUS 1 PO) Take 1 tablet by mouth daily    Historical Provider, MD   sertraline (Zoloft) 50 mg tablet Take 1 tablet (50 mg total) by mouth daily 6/17/24   Kaila Porter MD           Review of  Systems    Review of Systems   Constitutional:  Negative for chills and fever.   HENT:  Negative for ear pain and sore throat.    Eyes:  Negative for pain and visual disturbance.   Respiratory:  Negative for cough and shortness of breath.    Cardiovascular:  Negative for chest pain and palpitations.   Gastrointestinal:  Negative for abdominal pain and vomiting.   Genitourinary:  Positive for vaginal bleeding. Negative for dysuria and hematuria.   Musculoskeletal:  Negative for arthralgias and back pain.   Skin:  Negative for color change and rash.   Neurological:  Negative for seizures and syncope.   All other systems reviewed and are negative.      Physical Exam      ED Triage Vitals [07/01/24 1710]   Temperature Pulse Respirations Blood Pressure SpO2   97.5 °F (36.4 °C) 69 18 153/67 99 %      Temp Source Heart Rate Source Patient Position - Orthostatic VS BP Location FiO2 (%)   Oral Monitor Sitting Right arm --      Pain Score       --               Physical Exam  Vitals and nursing note reviewed.   Constitutional:       General: She is not in acute distress.     Appearance: She is well-developed.   HENT:      Head: Normocephalic and atraumatic.   Eyes:      Conjunctiva/sclera: Conjunctivae normal.   Cardiovascular:      Rate and Rhythm: Normal rate and regular rhythm.      Heart sounds: No murmur heard.  Pulmonary:      Effort: Pulmonary effort is normal. No respiratory distress.      Breath sounds: Normal breath sounds.   Abdominal:      Palpations: Abdomen is soft.      Tenderness: There is no abdominal tenderness.   Musculoskeletal:         General: No swelling.      Cervical back: Neck supple.   Skin:     General: Skin is warm and dry.      Capillary Refill: Capillary refill takes less than 2 seconds.   Neurological:      Mental Status: She is alert.   Psychiatric:         Mood and Affect: Mood normal.         Assessment and Plan    Delphine Carney is a 29 y.o. female who presents with heavy vaginal  bleeding. Physical examination otherwise unremarkable. Differential diagnosis (not completely inclusive) includes retained POC. Plan will be to perform diagnostic testing and treat symptomatically.      MDM    Diagnostic Results        Labs:    Results for orders placed or performed during the hospital encounter of 07/01/24   CBC and differential   Result Value Ref Range    WBC 6.54 4.31 - 10.16 Thousand/uL    RBC 4.38 3.81 - 5.12 Million/uL    Hemoglobin 13.4 11.5 - 15.4 g/dL    Hematocrit 40.0 34.8 - 46.1 %    MCV 91 82 - 98 fL    MCH 30.6 26.8 - 34.3 pg    MCHC 33.5 31.4 - 37.4 g/dL    RDW 11.9 11.6 - 15.1 %    MPV 9.5 8.9 - 12.7 fL    Platelets 322 149 - 390 Thousands/uL    nRBC 0 /100 WBCs    Segmented % 61 43 - 75 %    Immature Grans % 0 0 - 2 %    Lymphocytes % 29 14 - 44 %    Monocytes % 6 4 - 12 %    Eosinophils Relative 3 0 - 6 %    Basophils Relative 1 0 - 1 %    Absolute Neutrophils 4.07 1.85 - 7.62 Thousands/µL    Absolute Immature Grans 0.01 0.00 - 0.20 Thousand/uL    Absolute Lymphocytes 1.88 0.60 - 4.47 Thousands/µL    Absolute Monocytes 0.38 0.17 - 1.22 Thousand/µL    Eosinophils Absolute 0.17 0.00 - 0.61 Thousand/µL    Basophils Absolute 0.03 0.00 - 0.10 Thousands/µL   Comprehensive metabolic panel   Result Value Ref Range    Sodium 137 135 - 147 mmol/L    Potassium 4.1 3.5 - 5.3 mmol/L    Chloride 103 96 - 108 mmol/L    CO2 27 21 - 32 mmol/L    ANION GAP 7 4 - 13 mmol/L    BUN 17 5 - 25 mg/dL    Creatinine 0.68 0.60 - 1.30 mg/dL    Glucose 65 65 - 140 mg/dL    Calcium 9.4 8.4 - 10.2 mg/dL    AST 12 (L) 13 - 39 U/L    ALT 15 7 - 52 U/L    Alkaline Phosphatase 78 34 - 104 U/L    Total Protein 7.4 6.4 - 8.4 g/dL    Albumin 4.4 3.5 - 5.0 g/dL    Total Bilirubin 0.26 0.20 - 1.00 mg/dL    eGFR 119 ml/min/1.73sq m       All labs reviewed and utilized in the medical decision making process    Radiology:    US pelvis complete w transvaginal   Final Result      Thickened heterogeneous endometrium with no  Doppler signal to suggest retained products of conception. However retained products of conception cannot be excluded despite lack of blood flow.      The study was marked in EPIC for immediate notification.                           Workstation performed: [id number]             All radiology studies independently viewed by me and interpreted by the radiologist.    Procedure    Procedures      ED Course of Care and Re-Assessments      Medications   sodium chloride 0.9 % bolus 1,000 mL (0 mL Intravenous Stopped 7/1/24 2010)     Patient was seen by OB.    She is ok to follow up as an outpatient.          FINAL IMPRESSION    Final diagnoses:   Vaginal bleeding         DISPOSITION/PLAN    Time reflects when diagnosis was documented in both MDM as applicable and the Disposition within this note       Time User Action Codes Description Comment    7/1/2024  9:26 PM Karen Soria Add [N93.9] Vaginal bleeding           ED Disposition       ED Disposition   Discharge    Condition   Stable    Date/Time   Mon Jul 1, 2024  9:26 PM    Comment   Delphine Carney discharge to home/self care.                   Follow-up Information       Follow up With Specialties Details Why Contact Info    Kaila Porter MD Obstetrics and Gynecology Schedule an appointment as soon as possible for a visit   [address] [place] [address] [id number] [phone]                PATIENT REFERRED TO:    Kaila Porter MD  [address] [place] [id number]  [phone]    Schedule an appointment as soon as possible for a visit         DISCHARGE MEDICATIONS:    Discharge Medication List as of 7/1/2024  9:29 PM        CONTINUE these medications which have NOT CHANGED    Details   albuterol (PROVENTIL HFA,VENTOLIN HFA) 90 mcg/act inhaler Inhale 2 puffs every 6 (six) hours as needed, Historical Med      benzocaine-menthol-lanolin-aloe (DERMOPLAST) 20-0.5 % topical spray Apply 1 Application topically  every 6 (six) hours as needed for mild pain, Starting Fri 6/14/2024, No Print      Blood Pressure Monitoring (Blood Pressure Cuff) MISC Use in the morning, Starting Fri 5/10/2024, Normal      cetirizine (ZyrTEC) 10 mg tablet Take 10 mg by mouth daily, Historical Med      docusate sodium (COLACE) 100 mg capsule Take 1 capsule (100 mg total) by mouth 2 (two) times a day as needed for constipation, Starting Fri 6/14/2024, Normal      EPINEPHrine (EPIPEN) 0.3 mg/0.3 mL SOAJ Inject 0.3 mL (0.3 mg total) into a muscle once for 1 dose, Starting Tue 7/9/2019, Normal      fluticasone (FLONASE) 50 mcg/act nasal spray 1 spray into each nostril daily, Historical Med      ibuprofen (MOTRIN) 600 mg tablet Take 1 tablet (600 mg total) by mouth every 6 (six) hours as needed for mild pain, Starting Fri 6/14/2024, Normal      NIFEdipine (PROCARDIA XL) 30 mg 24 hr tablet Take 1 tablet (30 mg total) by mouth daily, Starting Fri 6/14/2024, Normal      Prenatal Vit-Fe Fumarate-FA (PRENATAL 1 PLUS 1 PO) Take 1 tablet by mouth daily, Historical Med      sertraline (Zoloft) 50 mg tablet Take 1 tablet (50 mg total) by mouth daily, Starting Mon 6/17/2024, Normal             No discharge procedures on file.         Karen Soria, DO     Karen Soria, DO  07/02/24 1895

## 2024-07-10 ENCOUNTER — POSTPARTUM VISIT (OUTPATIENT)
Dept: OBGYN CLINIC | Facility: CLINIC | Age: 29
End: 2024-07-10

## 2024-07-10 VITALS
HEIGHT: 65 IN | SYSTOLIC BLOOD PRESSURE: 108 MMHG | HEART RATE: 65 BPM | DIASTOLIC BLOOD PRESSURE: 60 MMHG | WEIGHT: 224.4 LBS | OXYGEN SATURATION: 100 % | BODY MASS INDEX: 37.39 KG/M2

## 2024-07-10 PROBLEM — O14.90 PREECLAMPSIA: Status: RESOLVED | Noted: 2024-05-10 | Resolved: 2024-07-10

## 2024-07-10 PROBLEM — N93.9 VAGINAL BLEEDING: Status: RESOLVED | Noted: 2024-07-01 | Resolved: 2024-07-10

## 2024-07-10 PROCEDURE — 99024 POSTOP FOLLOW-UP VISIT: CPT | Performed by: OBSTETRICS & GYNECOLOGY

## 2024-07-10 NOTE — ASSESSMENT & PLAN NOTE
4wks PostPartum.  Normal postpartum exam.   The patient may advance activity as tolerated and may resume sexual activity at 6 wks PP.  Contraception discussed. Patient s/p bilateral salpingectomy   EDPS score 3. Doing well with zoloft. Signs and symptoms of postpartum depression reviewed  Up to date on pap smears  F/u in 6-12 months for annual exam or as needed

## 2024-07-10 NOTE — PROGRESS NOTES
"   Patient ID: Delphine Carney is a 29 y.o. female.    Chief Complaint   Patient presents with    Postpartum Care         She presents for routine postpartum visit.   She is now a  who is 4+wks s/p repeat C/S and BS on 6/10/24 - 37 weeks for preeclampsia without severe features .      Postpartum course was complicated by need for BP medication - procardia XL 30 mg daily  She was also seen in the ER on  for episode of heavy VB and cramping. TVUS was not concerning for retained POC and she was stable for outpatient follow up   Just light spotting when she pumps, not even filling a pad   Mild cramping intermittently since but no severe pain     She denies further abn bleeding, pelvic pain, breast complaints, bowel/bladder dysfunction, depression/anx.   Baby BOY is thriving and is exclusively pumping  - had prolonged NICU stay due to pneumothorax, phototherapy but doing well now     [place]  Depression Scale Total: 3  Plans for contraception: BS    BP's 100's-110's/70's, stopped procardia      Pap 2023 NILM     The following portions of the patient's history were reviewed and updated as appropriate: allergies, current medications, past family history, past medical history, past social history, past surgical history, and problem list.    Review of Systems   Constitutional:  Negative for chills and fever.   Eyes:  Negative for visual disturbance.   Respiratory:  Negative for chest tightness and shortness of breath.    Cardiovascular:  Negative for chest pain.   Gastrointestinal:  Negative for abdominal pain, diarrhea, nausea and vomiting.   Genitourinary:  Negative for pelvic pain and vaginal bleeding.        As noted in HPI   Skin:  Negative for rash.   Neurological:  Negative for headaches.   All other systems reviewed and are negative.               Objective:  /60   Pulse 65   Ht 5' 5\" (1.651 m)   Wt 102 kg (224 lb 6.4 oz)   SpO2 100%   Breastfeeding Yes   BMI 37.34 kg/m² "     Physical Exam  Constitutional:       General: She is not in acute distress.     Appearance: Normal appearance.   HENT:      Head: Normocephalic and atraumatic.   Cardiovascular:      Rate and Rhythm: Normal rate.   Pulmonary:      Effort: Pulmonary effort is normal. No respiratory distress.   Abdominal:      General: A surgical scar is present. There is no distension.      Palpations: Abdomen is soft.      Tenderness: There is no abdominal tenderness. There is no guarding or rebound.      Comments: Incision C/D/I    Neurological:      General: No focal deficit present.      Mental Status: She is alert.   Psychiatric:         Mood and Affect: Mood normal.         Behavior: Behavior normal.   Vitals and nursing note reviewed.                   Postpartum Depression: Low Risk  (7/10/2024)    [place]  Depression Scale     Last EPDS Total Score: 3     Last EPDS Self Harm Result: Never   Recent Concern: Postpartum Depression - High Risk (2024)    [place]  Depression Scale     Last EPDS Total Score: 16     Last EPDS Self Harm Result: Never         Assessment/Plan:    Problem List Items Addressed This Visit       Encounter for postpartum visit - Primary     4wks PostPartum.  Normal postpartum exam.   The patient may advance activity as tolerated and may resume sexual activity at 6 wks PP.  Contraception discussed. Patient s/p bilateral salpingectomy   EDPS score 3. Doing well with zoloft. Signs and symptoms of postpartum depression reviewed  Up to date on pap smears  F/u in 6-12 months for annual exam or as needed

## 2024-08-27 ENCOUNTER — TELEPHONE (OUTPATIENT)
Dept: FAMILY MEDICINE CLINIC | Facility: CLINIC | Age: 29
End: 2024-08-27

## 2024-09-17 ENCOUNTER — OFFICE VISIT (OUTPATIENT)
Dept: FAMILY MEDICINE CLINIC | Facility: CLINIC | Age: 29
End: 2024-09-17
Payer: COMMERCIAL

## 2024-09-17 VITALS
HEART RATE: 70 BPM | BODY MASS INDEX: 37.36 KG/M2 | RESPIRATION RATE: 18 BRPM | HEIGHT: 65 IN | OXYGEN SATURATION: 99 % | DIASTOLIC BLOOD PRESSURE: 76 MMHG | WEIGHT: 224.2 LBS | TEMPERATURE: 99.4 F | SYSTOLIC BLOOD PRESSURE: 118 MMHG

## 2024-09-17 DIAGNOSIS — Z13.1 SCREENING FOR DIABETES MELLITUS: ICD-10-CM

## 2024-09-17 DIAGNOSIS — Z00.00 ANNUAL PHYSICAL EXAM: Primary | ICD-10-CM

## 2024-09-17 DIAGNOSIS — Z91.018 ALLERGY TO NUTS: ICD-10-CM

## 2024-09-17 DIAGNOSIS — Z13.6 SCREENING FOR CARDIOVASCULAR CONDITION: ICD-10-CM

## 2024-09-17 LAB — SL AMB POCT HEMOGLOBIN AIC: 4.5 (ref ?–6.5)

## 2024-09-17 PROCEDURE — 99395 PREV VISIT EST AGE 18-39: CPT | Performed by: STUDENT IN AN ORGANIZED HEALTH CARE EDUCATION/TRAINING PROGRAM

## 2024-09-17 PROCEDURE — 83036 HEMOGLOBIN GLYCOSYLATED A1C: CPT | Performed by: FAMILY MEDICINE

## 2024-09-17 RX ORDER — EPINEPHRINE 0.3 MG/.3ML
0.3 INJECTION SUBCUTANEOUS ONCE
Qty: 0.6 ML | Refills: 0 | Status: SHIPPED | OUTPATIENT
Start: 2024-09-17 | End: 2024-09-17

## 2024-09-17 RX ORDER — SEMAGLUTIDE 0.25 MG/.5ML
INJECTION, SOLUTION SUBCUTANEOUS
Status: CANCELLED | OUTPATIENT
Start: 2024-09-17

## 2024-09-17 RX ORDER — SEMAGLUTIDE 0.25 MG/.5ML
INJECTION, SOLUTION SUBCUTANEOUS
COMMUNITY
Start: 2024-08-24 | End: 2024-09-17 | Stop reason: ALTCHOICE

## 2024-09-17 NOTE — PATIENT INSTRUCTIONS
"Patient Education     Routine physical for adults   The Basics   Written by the doctors and editors at [facility]   What is a physical? -- A physical is a routine visit, or \"check-up,\" with your doctor. You might also hear it called a \"wellness visit\" or \"preventive visit.\"  During each visit, the doctor will:   Ask about your physical and mental health   Ask about your habits, behaviors, and lifestyle   Do an exam   Give you vaccines if needed   Talk to you about any medicines you take   Give advice about your health   Answer your questions  Getting regular check-ups is an important part of taking care of your health. It can help your doctor find and treat any problems you have. But it's also important for preventing health problems.  A routine physical is different from a \"sick visit.\" A sick visit is when you see a doctor because of a health concern or problem. Since physicals are scheduled ahead of time, you can think about what you want to ask the doctor.  How often should I get a physical? -- It depends on your age and health. In general, for people age 21 years and older:   If you are younger than 50 years, you might be able to get a physical every 3 years.   If you are 50 years or older, your doctor might recommend a physical every year.  If you have an ongoing health condition, like diabetes or high blood pressure, your doctor will probably want to see you more often.  What happens during a physical? -- In general, each visit will include:   Physical exam - The doctor or nurse will check your height, weight, heart rate, and blood pressure. They will also look at your eyes and ears. They will ask about how you are feeling and whether you have any symptoms that bother you.   Medicines - It's a good idea to bring a list of all the medicines you take to each doctor visit. Your doctor will talk to you about your medicines and answer any questions. Tell them if you are having any side effects that bother you. You " "should also tell them if you are having trouble paying for any of your medicines.   Habits and behaviors - This includes:   Your diet   Your exercise habits   Whether you smoke, drink alcohol, or use drugs   Whether you are sexually active   Whether you feel safe at home  Your doctor will talk to you about things you can do to improve your health and lower your risk of health problems. They will also offer help and support. For example, if you want to quit smoking, they can give you advice and might prescribe medicines. If you want to improve your diet or get more physical activity, they can help you with this, too.   Lab tests, if needed - The tests you get will depend on your age and situation. For example, your doctor might want to check your:   Cholesterol   Blood sugar   Iron level   Vaccines - The recommended vaccines will depend on your age, health, and what vaccines you already had. Vaccines are very important because they can prevent certain serious or deadly infections.   Discussion of screening - \"Screening\" means checking for diseases or other health problems before they cause symptoms. Your doctor can recommend screening based on your age, risk, and preferences. This might include tests to check for:   Cancer, such as breast, prostate, cervical, ovarian, colorectal, prostate, lung, or skin cancer   Sexually transmitted infections, such as chlamydia and gonorrhea   Mental health conditions like depression and anxiety  Your doctor will talk to you about the different types of screening tests. They can help you decide which screenings to have. They can also explain what the results might mean.   Answering questions - The physical is a good time to ask the doctor or nurse questions about your health. If needed, they can refer you to other doctors or specialists, too.  Adults older than 65 years often need other care, too. As you get older, your doctor will talk to you about:   How to prevent falling at " home   Hearing or vision tests   Memory testing   How to take your medicines safely   Making sure that you have the help and support you need at home  All topics are updated as new evidence becomes available and our peer review process is complete.  This topic retrieved from [employer] on: May 02, 2024.  Topic 940003 Version 1.0  Release: 32.4.3 - C32.122  © 2024 UpToDate, Inc. and/or its affiliates. All rights reserved.  Consumer Information Use and Disclaimer   Disclaimer: This generalized information is a limited summary of diagnosis, treatment, and/or medication information. It is not meant to be comprehensive and should be used as a tool to help the user understand and/or assess potential diagnostic and treatment options. It does NOT include all information about conditions, treatments, medications, side effects, or risks that may apply to a specific patient. It is not intended to be medical advice or a substitute for the medical advice, diagnosis, or treatment of a health care provider based on the health care provider's examination and assessment of a patient's specific and unique circumstances. Patients must speak with a health care provider for complete information about their health, medical questions, and treatment options, including any risks or benefits regarding use of medications. This information does not endorse any treatments or medications as safe, effective, or approved for treating a specific patient. UpToDate, Inc. and its affiliates disclaim any warranty or liability relating to this information or the use thereof.The use of this information is governed by the Terms of Use, available at https://www.woltersCoravinuwer.com/en/know/clinical-effectiveness-terms. 2024© UpToDate, Inc. and its affiliates and/or licensors. All rights reserved.  Copyright   © 2024 UpToDate, Inc. and/or its affiliates. All rights reserved.

## 2024-09-17 NOTE — PROGRESS NOTES
Adult Annual Physical  Name: Delphine Carney      : 1995      MRN: [id number]  Encounter Provider: Shoshana Enriquez DO  Encounter Date: 2024   Encounter department: [facility]    Assessment & Plan  Allergy to nuts    -Epi pen refilled     Orders:    EPINEPHrine (EPIPEN) 0.3 mg/0.3 mL SOAJ; Inject 0.3 mL (0.3 mg total) into a muscle once for 1 dose      Annual physical exam  -Concerns addressed   -Vaccines reviewed   -Prevention reviewed   -RTC in 6 weeks for med recheck          Screening for diabetes mellitus  -A1c of 4.8% today in office   -Continue to encourage diet and lifestyle changes     Orders:    POCT hemoglobin A1c      Screening for cardiovascular condition  -Lipid panel ordered     Orders:    Lipid panel; Future      BMI 37.0-37.9, adult  -Pt previously on wegovy but stopped for pregnancy   -Had had one month of 0.25 mg dose from another provider   -Wegovy refilled at 0.5 mg   -Declined dietician referral   -RTC in 6 weeks          Immunizations and preventive care screenings were discussed with patient today. Appropriate education was printed on patient's after visit summary.    Counseling:  Alcohol/drug use: discussed moderation in alcohol intake, the recommendations for healthy alcohol use, and avoidance of illicit drug use.  Dental Health: discussed importance of regular tooth brushing, flossing, and dental visits.  Injury prevention: discussed safety/seat belts, safety helmets, smoke detectors, carbon dioxide detectors, and smoking near bedding or upholstery.  Sexual health: discussed sexually transmitted diseases, partner selection, use of condoms, avoidance of unintended pregnancy, and contraceptive alternatives.  Exercise: the importance of regular exercise/physical activity was discussed. Recommend exercise 3-5 times per week for at least 30 minutes.   BMI Counseling: Body mass index is 37.31 kg/m². The BMI is above normal. Nutrition  recommendations include reducing portion sizes, decreasing overall calorie intake, 3-5 servings of fruits/vegetables daily, reducing fast food intake, consuming healthier snacks, decreasing soda and/or juice intake, moderation in carbohydrate intake, and increasing intake of lean protein. Exercise recommendations include moderate aerobic physical activity for 150 minutes/week, vigorous aerobic physical activity for 75 minutes/week, and exercising 3-5 times per week. Pharmacotherapy was ordered for patient to aid in weight loss.       Depression Screening and Follow-up Plan: Patient was screened for depression during today's encounter. They screened negative with a PHQ-9 score of 0.        History of Present Illness     Adult Annual Physical:  Patient presents for annual physical. Delphine Carney is a 28 y/o F with PMHx of obesity, asthma who presents for annual exam. Requesting wegovy as she was previously on this prior to pregnancy for weight loss. Has been attempting to improve diet and trying to exercise. On Wegovy 0.25 mg and has been for two months. No issues with history of pancreatitis, thyroid cancer, or gallbladder issues.     Delivered a baby boy via  in 2024. Baby had a NICU stay which caused mom to go back on her Zoloft temporarily. She is feeling better with improved mood and is no longer on her Zoloft. She does not desire to go back on it..     Diet and Physical Activity:  - Diet/Nutrition: portion control, well balanced diet and consuming 3-5 servings of fruits/vegetables daily. high protein  - Exercise:. stepping    Depression Screening:    - PHQ-9 Score: 0    General Health:  - Sleep: sleeps well.  - Hearing: normal hearing bilateral ears.  - Vision: no vision problems.  - Dental: brushes teeth twice daily and regular dental visits.    /GYN Health:  - Follows with GYN: yes.   - Menopause: premenopausal.   - History of STDs: no  - Contraception:. tubes tied      Advanced Care  "Planning:  - Has an advanced directive?: no    - Has a durable medical POA?: no    - ACP document given to patient?: no        Preventive Screenings  Colorectal Cancer Screening: Not indicated due to age and lack of family history   Breast Cancer Screening: Not indicated due to age and lack of family history   Cervical Cancer Screening: Up to date   Lung Cancer Screening: Never a smoker  AAA Screening: Not indicated due to age and lack of smoking history   STD Screening: Declined   Preconception Counseling: Discussed, tubes tied   Cardiovascular Screening: lipid panel and A1c ordered  Kidney health: eGRF of 119 in July 2024   Advance Care Planning: Information provided   History of Bariatric surgery: No  Polypharmacy? No  Vaccination indicated: Up to date     Review of Systems   Constitutional:  Negative for fever.   HENT:  Negative for congestion and dental problem.    Respiratory:  Negative for cough and shortness of breath.    Cardiovascular:  Negative for chest pain.   Gastrointestinal:  Negative for diarrhea, nausea and vomiting.   Genitourinary:  Negative for dysuria, vaginal bleeding, vaginal discharge and vaginal pain.   Musculoskeletal:  Negative for gait problem.   Skin:  Negative for rash.   Neurological:  Negative for dizziness and syncope.   Psychiatric/Behavioral:  Negative for behavioral problems, dysphoric mood and sleep disturbance.          Objective     /76 (BP Location: Left arm, Patient Position: Sitting, Cuff Size: Large)   Pulse 70   Temp 99.4 °F (37.4 °C) (Tympanic)   Resp 18   Ht 5' 5\" (1.651 m)   Wt 102 kg (224 lb 3.2 oz)   LMP  (LMP Unknown)   SpO2 99%   Breastfeeding No   BMI 37.31 kg/m²     Physical Exam  Vitals reviewed.   Constitutional:       General: She is not in acute distress.     Appearance: Normal appearance. She is obese. She is not ill-appearing or toxic-appearing.   HENT:      Head: Normocephalic and atraumatic.      Right Ear: External ear normal.      Left " Ear: External ear normal.      Nose: Nose normal. No congestion or rhinorrhea.   Eyes:      Conjunctiva/sclera: Conjunctivae normal.   Cardiovascular:      Rate and Rhythm: Normal rate and regular rhythm.      Heart sounds: Normal heart sounds. No murmur heard.     No friction rub. No gallop.   Pulmonary:      Effort: Pulmonary effort is normal. No respiratory distress.      Breath sounds: Normal breath sounds. No wheezing, rhonchi or rales.   Abdominal:      General: Abdomen is flat.      Palpations: Abdomen is soft.      Tenderness: There is no abdominal tenderness.   Musculoskeletal:      Right lower leg: No edema.      Left lower leg: No edema.   Skin:     General: Skin is warm and dry.      Findings: No rash.   Neurological:      General: No focal deficit present.      Mental Status: She is alert and oriented to person, place, and time.      Gait: Gait normal.   Psychiatric:         Mood and Affect: Mood normal.         Behavior: Behavior normal.         Thought Content: Thought content normal.       Shoshana Enriquez DO   PGY-2 [place]  Residency   [facility]

## 2024-09-22 ENCOUNTER — NURSE TRIAGE (OUTPATIENT)
Dept: OTHER | Facility: OTHER | Age: 29
End: 2024-09-22

## 2024-09-22 NOTE — TELEPHONE ENCOUNTER
"Reason for Disposition  • [1] Caller has NON-URGENT medicine question about med that PCP prescribed AND [2] triager unable to answer question    Answer Assessment - Initial Assessment Questions  1. NAME of MEDICINE: \"What medicine(s) are you calling about?\"  Wegovy 0.5   2. QUESTION: \"What is your question?\" (e.g., double dose of medicine, side effect)      Pharmacies are out of stock, can I go on a lower dose -.25  3. PRESCRIBER: \"Who prescribed the medicine?\" Reason: if prescribed by specialist, call should be referred to that group.   Shoshana Enriquez, DO    Please call patient to confirm dosage and prescription.    Protocols used: Medication Question Call-Adult-    "

## 2024-09-22 NOTE — TELEPHONE ENCOUNTER
"Regarding: Wegovy  ----- Message from Gricelda VILLELA sent at 9/22/2024  1:39 PM EDT -----  \"I take wegovy but the the pharmacy has the one that was sent on backorder. I wanted to know if I could go back on my previous dose and if that could be sent to the pharmacy instead.\"    "

## 2024-09-24 ENCOUNTER — TELEPHONE (OUTPATIENT)
Dept: FAMILY MEDICINE CLINIC | Facility: CLINIC | Age: 29
End: 2024-09-24

## 2024-09-24 DIAGNOSIS — E66.9 OBESITY (BMI 35.0-39.9 WITHOUT COMORBIDITY): Primary | ICD-10-CM

## 2024-09-24 NOTE — TELEPHONE ENCOUNTER
Spoke with patient. Made patient aware of new prescription for Wegovy sent to the pharmacy. She verbalized understanding.

## 2024-09-24 NOTE — TELEPHONE ENCOUNTER
Patient called stating that the increased amount of wegovy is not in stock in any local pharmacies up to 1 hour away from her. She is asking if the previous dose can be ordered again

## 2024-10-30 ENCOUNTER — OFFICE VISIT (OUTPATIENT)
Dept: FAMILY MEDICINE CLINIC | Facility: CLINIC | Age: 29
End: 2024-10-30
Payer: COMMERCIAL

## 2024-10-30 VITALS
OXYGEN SATURATION: 98 % | RESPIRATION RATE: 18 BRPM | WEIGHT: 213.2 LBS | HEART RATE: 80 BPM | DIASTOLIC BLOOD PRESSURE: 82 MMHG | SYSTOLIC BLOOD PRESSURE: 110 MMHG | HEIGHT: 65 IN | BODY MASS INDEX: 35.52 KG/M2 | TEMPERATURE: 100.3 F

## 2024-10-30 DIAGNOSIS — Z71.3 NUTRITIONAL COUNSELING: ICD-10-CM

## 2024-10-30 DIAGNOSIS — Z76.89 ENCOUNTER FOR WEIGHT MANAGEMENT: Primary | ICD-10-CM

## 2024-10-30 DIAGNOSIS — E66.9 OBESITY (BMI 35.0-39.9 WITHOUT COMORBIDITY): ICD-10-CM

## 2024-10-30 DIAGNOSIS — F41.9 ANXIETY: ICD-10-CM

## 2024-10-30 PROCEDURE — 99213 OFFICE O/P EST LOW 20 MIN: CPT | Performed by: STUDENT IN AN ORGANIZED HEALTH CARE EDUCATION/TRAINING PROGRAM

## 2024-10-30 NOTE — PROGRESS NOTES
"Ambulatory Visit  Name: Delphine Carney      : 1995      MRN: [id number]  Encounter Provider: Shoshana Enriquez DO  Encounter Date: 10/30/2024   Encounter department: [facility]    Assessment & Plan  Encounter for weight management  -Goal of 25 BMI   -Recommend 150 minutes of exercise a week   -Continue high protein diet   -Continue wegovy at 0.5 for at least 4 doses   -Will increase to 1 after that   -RTC in 1 month for anxiety   Orders:    Semaglutide-Weight Management (WEGOVY) 1 MG/0.5ML; Inject 0.5 mL (1 mg total) under the skin once a week for 4 doses    Nutritional counseling  -Provided focusing on high protein, low sugar and low carb diet   -Emphasized not restricting food        Anxiety  -Per patient report   -RTC in one month for assessment        Obesity (BMI 35.0-39.9 without comorbidity)  -As above     Orders:    Semaglutide-Weight Management (WEGOVY) 1 MG/0.5ML; Inject 0.5 mL (1 mg total) under the skin once a week for 4 doses         History of Present Illness     Delphine Carney is a 30 y/o F with PMHx of asthma and obesity who presents to me for follow up of weight loss. Was on wegovy 0.25 and dose was increased last visit but due to pharmacy shortage, patient was not about to start the increased 0.5 dose until this last week. Tolerating both doses well with no GI side effects of abdominal pain. Weight of 213 today, down from 224 in September. Pt has goal of BMI 25. Since losing weight the patient feels like she is less \"out of breath\" with movement/exercise.     Diet: high protein   Breakfast: protein shake   Lunch: large salad   Dinner: chicken and vegetables   Snacks: Protein bars    Exercise: 45 minutes a day/5 days a week on stepper (cardio machine). Walking with children outside. Does do home workouts with weights.     Pt has a  at home and does complain of increased \"anxiety\". Agreeable to follow up with this PCP for assessment and treatment. "       Review of Systems   Constitutional:  Negative for fever.   Respiratory:  Negative for cough and shortness of breath.    Gastrointestinal:  Negative for abdominal pain, diarrhea, nausea and vomiting.   Genitourinary:  Negative for difficulty urinating.   Musculoskeletal:  Negative for gait problem.   Skin:  Negative for rash.   Neurological:  Negative for syncope.   Psychiatric/Behavioral:  The patient is nervous/anxious.      Past Medical History:   Diagnosis Date    Asthma     Hip instability     History of gestational hypertension 2024    Dx at 31 wks. Weighed 70 lbs more at that point. Had medical weight loss       History of postpartum hemorrhage 2024    Preeclampsia 05/10/2024    Multiple mildly elevated BP's at home and at 34 weeks, 35 week office visits   UPC at 33 weeks 0.31  - twice weekly surveillance  - weekly labs  - delivery at 37 weeks        Past Surgical History:   Procedure Laterality Date     SECTION      [place]  DELIVERY ONLY N/A 6/10/2024    Procedure:  SECTION () REPEAT;  Surgeon: Kaila Porter MD;  Location: [facility];  Service: Obstetrics    TUBAL LIGATION N/A 6/10/2024    Procedure: LIGATION/COAGULATION TUBAL;  Surgeon: Kaila Porter MD;  Location: [facility];  Service: Obstetrics     Family History   Problem Relation Age of Onset    Allergy (severe) Mother         Mold    Deep vein thrombosis Father 40        after surgery    Seizures Sister     Thyroid disease Sister     Thyroid cancer Sister     Pulmonary embolism Neg Hx     Stroke Neg Hx     Heart attack Neg Hx     Diabetes Neg Hx      Social History     Tobacco Use    Smoking status: Never     Passive exposure: Never    Smokeless tobacco: Never   Vaping Use    Vaping status: Never Used   Substance and Sexual Activity    Alcohol use: Not Currently    Drug use: Never    Sexual activity: Yes     Partners: Male     Birth control/protection: Surgical     Current Outpatient Medications on File  Prior to Visit   Medication Sig    albuterol (PROVENTIL HFA,VENTOLIN HFA) 90 mcg/act inhaler Inhale 2 puffs every 6 (six) hours as needed    Blood Pressure Monitoring (Blood Pressure Cuff) MISC Use in the morning    cetirizine (ZyrTEC) 10 mg tablet Take 10 mg by mouth daily    fluticasone (FLONASE) 50 mcg/act nasal spray 1 spray into each nostril daily    Prenatal Vit-Fe Fumarate-FA (PRENATAL 1 PLUS 1 PO) Take 1 tablet by mouth daily    benzocaine-menthol-lanolin-aloe (DERMOPLAST) 20-0.5 % topical spray Apply 1 Application topically every 6 (six) hours as needed for mild pain (Patient not taking: Reported on 9/17/2024)    docusate sodium (COLACE) 100 mg capsule Take 1 capsule (100 mg total) by mouth 2 (two) times a day as needed for constipation (Patient not taking: Reported on 7/10/2024)    EPINEPHrine (EPIPEN) 0.3 mg/0.3 mL SOAJ Inject 0.3 mL (0.3 mg total) into a muscle once for 1 dose    ibuprofen (MOTRIN) 600 mg tablet Take 1 tablet (600 mg total) by mouth every 6 (six) hours as needed for mild pain (Patient not taking: Reported on 9/17/2024)    sertraline (Zoloft) 50 mg tablet Take 1 tablet (50 mg total) by mouth daily (Patient not taking: Reported on 9/17/2024)     Allergies   Allergen Reactions    Nuts - Food Allergy Throat Swelling     Almonds, hazelnuts and peanuts    Other Itching     Seasonal pollin allergies    Soybean-Containing Drug Products - Food Allergy GI Intolerance    Apple - Food Allergy     Shellfish Allergy - Food Allergy     Short Ragweed Pollen Ext     Tree Extract     Uncaria Tomentosa (Cats Claw) Allergic Rhinitis     Immunization History   Administered Date(s) Administered    COVID-19 MODERNA VACC 0.5 ML IM 03/10/2021, 03/10/2021, 04/07/2021, 04/07/2021, 01/10/2022, 01/10/2022    DTaP 1996, 04/25/2001    DTaP 5 1996, 1996, 1996, 08/06/1997, 05/25/2001    Hep B, Adolescent or Pediatric 1996, 1996, 1996    Hib (PRP-OMP) 1996, 1996,  "1996, 02/04/1997    INFLUENZA 11/04/2020    IPV 1996, 1996, 1996, 1996, 08/06/1997, 04/25/2001    Influenza, seasonal, injectable 1996    MMR 05/17/1997, 05/17/1997, 04/25/2001    Meningococcal MCV4P 11/22/2011, 11/22/2011    Tdap 11/22/2011, 11/04/2020, 04/25/2024    Tuberculin Skin Test-PPD Intradermal 11/18/1999, 11/18/1999    Varicella 02/04/1997, 11/22/2011, 11/22/2011     Objective     /82 (BP Location: Left arm, Patient Position: Sitting, Cuff Size: Large)   Pulse 80   Temp 100.3 °F (37.9 °C) (Tympanic)   Resp 18   Ht 5' 5\" (1.651 m)   Wt 96.7 kg (213 lb 3.2 oz)   LMP  (LMP Unknown)   SpO2 98%   Breastfeeding No   BMI 35.48 kg/m²     Physical Exam  Vitals reviewed.   Constitutional:       General: She is not in acute distress.     Appearance: Normal appearance. She is obese. She is not ill-appearing or toxic-appearing.   HENT:      Head: Normocephalic and atraumatic.      Nose: Nose normal.   Eyes:      Conjunctiva/sclera: Conjunctivae normal.   Cardiovascular:      Rate and Rhythm: Normal rate and regular rhythm.      Heart sounds: Normal heart sounds. No murmur heard.     No friction rub. No gallop.   Pulmonary:      Effort: Pulmonary effort is normal. No respiratory distress.      Breath sounds: Normal breath sounds. No wheezing, rhonchi or rales.   Abdominal:      General: Abdomen is flat. There is no distension.      Palpations: Abdomen is soft.      Tenderness: There is no abdominal tenderness.   Skin:     General: Skin is warm and dry.      Findings: No rash.   Neurological:      General: No focal deficit present.      Mental Status: She is alert and oriented to person, place, and time.      Gait: Gait normal.   Psychiatric:         Mood and Affect: Mood normal.         Behavior: Behavior normal.         Thought Content: Thought content normal.       Shoshana Enriquez DO   PGY-2 Rural  Residency   [facility]   "

## 2024-10-31 NOTE — ASSESSMENT & PLAN NOTE
-As above     Orders:    Semaglutide-Weight Management (WEGOVY) 1 MG/0.5ML; Inject 0.5 mL (1 mg total) under the skin once a week for 4 doses

## 2024-11-04 DIAGNOSIS — Z76.89 ENCOUNTER FOR WEIGHT MANAGEMENT: ICD-10-CM

## 2024-11-04 DIAGNOSIS — E66.9 OBESITY (BMI 35.0-39.9 WITHOUT COMORBIDITY): ICD-10-CM

## 2024-11-07 ENCOUNTER — TELEPHONE (OUTPATIENT)
Dept: FAMILY MEDICINE CLINIC | Facility: CLINIC | Age: 29
End: 2024-11-07

## 2024-11-07 DIAGNOSIS — E66.9 OBESITY (BMI 35.0-39.9 WITHOUT COMORBIDITY): ICD-10-CM

## 2024-11-07 DIAGNOSIS — Z76.89 ENCOUNTER FOR WEIGHT MANAGEMENT: ICD-10-CM

## 2024-11-07 NOTE — TELEPHONE ENCOUNTER
Patient called asking if the wegovy 0.5mg call be called into [facility] in [place] as the 1 mg is not in stock anywhere? She states she is ok with sticking with the 0.5mg

## 2024-11-19 ENCOUNTER — OFFICE VISIT (OUTPATIENT)
Dept: FAMILY MEDICINE CLINIC | Facility: CLINIC | Age: 29
End: 2024-11-19
Payer: COMMERCIAL

## 2024-11-19 VITALS
BODY MASS INDEX: 35.89 KG/M2 | HEIGHT: 65 IN | OXYGEN SATURATION: 99 % | HEART RATE: 71 BPM | DIASTOLIC BLOOD PRESSURE: 82 MMHG | RESPIRATION RATE: 18 BRPM | TEMPERATURE: 98.5 F | SYSTOLIC BLOOD PRESSURE: 104 MMHG | WEIGHT: 215.4 LBS

## 2024-11-19 DIAGNOSIS — G47.9 SLEEP DISTURBANCE: ICD-10-CM

## 2024-11-19 DIAGNOSIS — F41.9 ANXIETY: Primary | ICD-10-CM

## 2024-11-19 PROCEDURE — 99214 OFFICE O/P EST MOD 30 MIN: CPT | Performed by: STUDENT IN AN ORGANIZED HEALTH CARE EDUCATION/TRAINING PROGRAM

## 2024-11-19 RX ORDER — ESCITALOPRAM OXALATE 10 MG/1
10 TABLET ORAL DAILY
Qty: 30 TABLET | Refills: 0 | Status: SHIPPED | OUTPATIENT
Start: 2024-11-19 | End: 2024-12-19

## 2024-11-19 NOTE — PROGRESS NOTES
"Name: Delphine Carney      : 1995      MRN: [id number]  Encounter Provider: Shoshana Enriquez DO  Encounter Date: 2024   Encounter department: [facility]    Assessment & Plan  Anxiety  -Recommend journaling   -Continue therapy as per their team   -Lexapro 10 mg ordered   -ER precautions including hallucinations, delusions, SI/HI given   -RTC in 4-5 weeks for mood recheck   Orders:    TSH, 3rd generation with Free T4 reflex; Future    escitalopram (Lexapro) 10 mg tablet; Take 1 tablet (10 mg total) by mouth daily    Sleep disturbance  Likely a result of anxiety and taking care of new baby but primary sleep disorder not totally ruled out.     -Encouraged sleep hygiene including not laying in bed for more than 20 minutes trying to fall asleep   -Recommend \"sleepy time\" tea one hour before bed   -Recommend continued avoidance of screens one hour before bed   -Recommend waking up at the same time every day regardless of bed time   -Will treat anxiety and re-assess sleep in 4-5 weeks          Depression Screening and Follow-up Plan: Patient's depression screening was positive with a PHQ-9 score of 5. Patient with underlying depression and was advised to continue current medications as prescribed.         History of Present Illness     Delphine Carney is a 30 y/o F with PMHx of asthma, obesity, post partum depression who presents for anxiety. States that this has been an ongoing issue since she was younger but she never got help for it because she \"didn't like doctors\". She has never been hospitalized for psych issues. No SI/HI. Was on zoloft for two prior pregnancies but did not like the way it made her feel. She has been attending therapy weekly. She notices intermittent anxiety throughout the day with certain family/stress related triggers.     Reports issues with sleep. Will feel sleepy and then go to lay down but feel \"wide awake\". Does take more than 20 minutes for her " to fall asleep. She thinks that she does have some racing though. Wakes up a few times throughout the night when she hears her toddler making noise. She thinks she woke up during the night even prior to having a small baby. Tried to use melatonin to sleep but got weird dreams. Avoids screens before bedtime. She wakes up consistently at 7:30 AM to get her children ready for school.     AMANDA Score  8   PHQ-9 Score: 5  PHQ-9 Interpretation: Mild depression  Rapid mood screener in media tab     Declines flu shot in office today, would like to wait until next appointment.       Review of Systems   Constitutional:  Negative for fever.   HENT:  Negative for congestion.    Respiratory:  Negative for shortness of breath.    Cardiovascular:  Negative for chest pain and palpitations.   Gastrointestinal:  Negative for nausea and vomiting.   Musculoskeletal:  Negative for gait problem.   Skin:  Negative for rash.   Psychiatric/Behavioral:  Positive for sleep disturbance. Negative for confusion and suicidal ideas.      Past Medical History:   Diagnosis Date    Asthma     Hip instability     History of gestational hypertension 2024    Dx at 31 wks. Weighed 70 lbs more at that point. Had medical weight loss       History of postpartum hemorrhage 2024    Preeclampsia 05/10/2024    Multiple mildly elevated BP's at home and at 34 weeks, 35 week office visits   UPC at 33 weeks 0.31  - twice weekly surveillance  - weekly labs  - delivery at 37 weeks        Past Surgical History:   Procedure Laterality Date     SECTION      [place]  DELIVERY ONLY N/A 6/10/2024    Procedure:  SECTION () REPEAT;  Surgeon: Kaila Porter MD;  Location: [facility];  Service: Obstetrics    TUBAL LIGATION N/A 6/10/2024    Procedure: LIGATION/COAGULATION TUBAL;  Surgeon: Kaila Porter MD;  Location: [facility];  Service: Obstetrics     Family History   Problem Relation Age of Onset    Allergy (severe) Mother         Mold     Deep vein thrombosis Father 40        after surgery    Seizures Sister     Thyroid disease Sister     Thyroid cancer Sister     Pulmonary embolism Neg Hx     Stroke Neg Hx     Heart attack Neg Hx     Diabetes Neg Hx      Social History     Tobacco Use    Smoking status: Never     Passive exposure: Never    Smokeless tobacco: Never   Vaping Use    Vaping status: Never Used   Substance and Sexual Activity    Alcohol use: Not Currently    Drug use: Never    Sexual activity: Yes     Partners: Male     Birth control/protection: Surgical     Current Outpatient Medications on File Prior to Visit   Medication Sig    albuterol (PROVENTIL HFA,VENTOLIN HFA) 90 mcg/act inhaler Inhale 2 puffs every 6 (six) hours as needed    Blood Pressure Monitoring (Blood Pressure Cuff) MISC Use in the morning    cetirizine (ZyrTEC) 10 mg tablet Take 10 mg by mouth daily    EPINEPHrine (EPIPEN) 0.3 mg/0.3 mL SOAJ Inject 0.3 mL (0.3 mg total) into a muscle once for 1 dose    fluticasone (FLONASE) 50 mcg/act nasal spray 1 spray into each nostril daily    Prenatal Vit-Fe Fumarate-FA (PRENATAL 1 PLUS 1 PO) Take 1 tablet by mouth daily    Semaglutide-Weight Management (WEGOVY) 0.5 MG/0.5ML Inject 0.5 mL (0.5 mg total) under the skin once a week for 4 doses     Allergies   Allergen Reactions    Nuts - Food Allergy Throat Swelling     Almonds, hazelnuts and peanuts    Other Itching     Seasonal pollin allergies    Soybean-Containing Drug Products - Food Allergy GI Intolerance    Apple - Food Allergy     Shellfish Allergy - Food Allergy     Short Ragweed Pollen Ext     Tree Extract     Uncaria Tomentosa (Cats Claw) Allergic Rhinitis     Immunization History   Administered Date(s) Administered    COVID-19 MODERNA VACC 0.5 ML IM 03/10/2021, 03/10/2021, 04/07/2021, 04/07/2021, 01/10/2022, 01/10/2022    DTaP 1996, 04/25/2001    DTaP 5 1996, 1996, 1996, 08/06/1997, 05/25/2001    Hep B, Adolescent or Pediatric 1996, 1996,  "1996    Hib (PRP-OMP) 1996, 1996, 1996, 02/04/1997    INFLUENZA 11/04/2020    IPV 1996, 1996, 1996, 1996, 08/06/1997, 04/25/2001    Influenza, seasonal, injectable 1996    MMR 05/17/1997, 05/17/1997, 04/25/2001    Meningococcal MCV4P 11/22/2011, 11/22/2011    Tdap 11/22/2011, 11/04/2020, 04/25/2024    Tuberculin Skin Test-PPD Intradermal 11/18/1999, 11/18/1999    Varicella 02/04/1997, 11/22/2011, 11/22/2011     Objective   /82 (BP Location: Left arm, Patient Position: Sitting, Cuff Size: Large)   Pulse 71   Temp 98.5 °F (36.9 °C) (Tympanic)   Resp 18   Ht 5' 5\" (1.651 m)   Wt 97.7 kg (215 lb 6.4 oz)   LMP  (LMP Unknown)   SpO2 99%   Breastfeeding No   BMI 35.84 kg/m²     Physical Exam  Vitals reviewed.   Constitutional:       General: She is not in acute distress.     Appearance: Normal appearance. She is obese. She is not ill-appearing or toxic-appearing.   HENT:      Head: Normocephalic and atraumatic.      Nose: Nose normal.   Eyes:      Conjunctiva/sclera: Conjunctivae normal.   Neck:      Thyroid: No thyroid mass, thyromegaly or thyroid tenderness.   Cardiovascular:      Rate and Rhythm: Normal rate and regular rhythm.      Heart sounds: Normal heart sounds. No murmur heard.     No friction rub. No gallop.   Pulmonary:      Effort: Pulmonary effort is normal. No respiratory distress.      Breath sounds: Normal breath sounds. No wheezing, rhonchi or rales.   Skin:     General: Skin is warm and dry.   Neurological:      General: No focal deficit present.      Mental Status: She is alert and oriented to person, place, and time.      Gait: Gait normal.   Psychiatric:         Mood and Affect: Mood normal.         Behavior: Behavior normal.         Thought Content: Thought content normal. Thought content is not delusional. Thought content does not include homicidal or suicidal ideation. Thought content does not include homicidal or suicidal plan. "       Shoshana Enriquez DO   PGY-2 Rural FM Residency   [facility]

## 2024-12-17 ENCOUNTER — OFFICE VISIT (OUTPATIENT)
Dept: FAMILY MEDICINE CLINIC | Facility: CLINIC | Age: 29
End: 2024-12-17
Payer: COMMERCIAL

## 2024-12-17 VITALS
DIASTOLIC BLOOD PRESSURE: 82 MMHG | WEIGHT: 207.2 LBS | SYSTOLIC BLOOD PRESSURE: 112 MMHG | OXYGEN SATURATION: 98 % | HEART RATE: 92 BPM | RESPIRATION RATE: 18 BRPM | HEIGHT: 65 IN | BODY MASS INDEX: 34.52 KG/M2 | TEMPERATURE: 99.5 F

## 2024-12-17 DIAGNOSIS — F41.9 ANXIETY: ICD-10-CM

## 2024-12-17 DIAGNOSIS — E66.9 OBESITY (BMI 35.0-39.9 WITHOUT COMORBIDITY): Primary | ICD-10-CM

## 2024-12-17 PROCEDURE — 99214 OFFICE O/P EST MOD 30 MIN: CPT | Performed by: STUDENT IN AN ORGANIZED HEALTH CARE EDUCATION/TRAINING PROGRAM

## 2024-12-17 RX ORDER — SEMAGLUTIDE 1 MG/.5ML
INJECTION, SOLUTION SUBCUTANEOUS
COMMUNITY
Start: 2024-11-20 | End: 2024-12-17

## 2024-12-17 RX ORDER — ESCITALOPRAM OXALATE 20 MG/1
20 TABLET ORAL DAILY
Qty: 60 TABLET | Refills: 0 | Status: SHIPPED | OUTPATIENT
Start: 2024-12-17 | End: 2025-02-15

## 2024-12-17 NOTE — PROGRESS NOTES
"Name: Delphine Carney      : 1995      MRN: [id number]  Encounter Provider: Shoshana Enriquez DO  Encounter Date: 2024   Encounter department: [facility]    Assessment & Plan  Anxiety  -Lexapro increased to 20 mg   -RTC as symptoms arise or progress   Orders:    escitalopram (LEXAPRO) 20 mg tablet; Take 1 tablet (20 mg total) by mouth daily    Obesity (BMI 35.0-39.9 without comorbidity)  -Continue wegovy   -RTC in 6 months   Orders:    Semaglutide-Weight Management (WEGOVY) 1.7 MG/0.75ML; Inject 0.75 mL (1.7 mg total) under the skin once a week         History of Present Illness     Delphine Carney is a 28 y/o F with PMHx of asthma, obesity, post partum depression who presents for anxiety follow up. Started on 10 mg of lexapro at last appointment. States it has significantly impacted her sleep in a positive manner. She is feeling mostly improved in her moods. She does have some remaining \"edge\" and would like to increase the dose of lexapro. No issues with GI upset,  or other side effects.     She is taking the 1 mg of Wegovy and having no issues with it.       Review of Systems   Constitutional:  Negative for fever.   Respiratory:  Negative for shortness of breath.    Cardiovascular:  Negative for chest pain.   Gastrointestinal:  Negative for nausea and vomiting.   Musculoskeletal:  Negative for gait problem.   Skin:  Negative for rash.   Neurological:  Negative for syncope.   Psychiatric/Behavioral:  Negative for behavioral problems and sleep disturbance.      Past Medical History:   Diagnosis Date    Asthma     Hip instability     History of gestational hypertension 2024    Dx at 31 wks. Weighed 70 lbs more at that point. Had medical weight loss       History of postpartum hemorrhage 2024    Preeclampsia 05/10/2024    Multiple mildly elevated BP's at home and at 34 weeks, 35 week office visits   UPC at 33 weeks 0.31  - twice weekly surveillance  - weekly " labs  - delivery at 37 weeks        Past Surgical History:   Procedure Laterality Date     SECTION      [place]  DELIVERY ONLY N/A 6/10/2024    Procedure:  SECTION () REPEAT;  Surgeon: Kaila Porter MD;  Location: [facility];  Service: Obstetrics    TUBAL LIGATION N/A 6/10/2024    Procedure: LIGATION/COAGULATION TUBAL;  Surgeon: Kaila Porter MD;  Location: [facility];  Service: Obstetrics     Family History   Problem Relation Age of Onset    Allergy (severe) Mother         Mold    Deep vein thrombosis Father 40        after surgery    Seizures Sister     Thyroid disease Sister     Thyroid cancer Sister     Pulmonary embolism Neg Hx     Stroke Neg Hx     Heart attack Neg Hx     Diabetes Neg Hx      Social History     Tobacco Use    Smoking status: Never     Passive exposure: Never    Smokeless tobacco: Never   Vaping Use    Vaping status: Never Used   Substance and Sexual Activity    Alcohol use: Not Currently    Drug use: Never    Sexual activity: Yes     Partners: Male     Birth control/protection: Surgical     Current Outpatient Medications on File Prior to Visit   Medication Sig    albuterol (PROVENTIL HFA,VENTOLIN HFA) 90 mcg/act inhaler Inhale 2 puffs every 6 (six) hours as needed    Blood Pressure Monitoring (Blood Pressure Cuff) MISC Use in the morning    cetirizine (ZyrTEC) 10 mg tablet Take 10 mg by mouth daily    EPINEPHrine (EPIPEN) 0.3 mg/0.3 mL SOAJ Inject 0.3 mL (0.3 mg total) into a muscle once for 1 dose    escitalopram (Lexapro) 10 mg tablet Take 1 tablet (10 mg total) by mouth daily    fluticasone (FLONASE) 50 mcg/act nasal spray 1 spray into each nostril daily    Prenatal Vit-Fe Fumarate-FA (PRENATAL 1 PLUS 1 PO) Take 1 tablet by mouth daily     Allergies   Allergen Reactions    Nuts - Food Allergy Throat Swelling     Almonds, hazelnuts and peanuts    Other Itching     Seasonal pollin allergies    Soybean-Containing Drug Products - Food Allergy GI Intolerance    Apple  "- Food Allergy     Shellfish Allergy - Food Allergy     Short Ragweed Pollen Ext     Tree Extract     Uncaria Tomentosa (Cats Claw) Allergic Rhinitis     Immunization History   Administered Date(s) Administered    COVID-19 MODERNA VACC 0.5 ML IM 03/10/2021, 03/10/2021, 04/07/2021, 04/07/2021, 01/10/2022, 01/10/2022    DTaP 1996, 04/25/2001    DTaP 5 1996, 1996, 1996, 08/06/1997, 05/25/2001    Hep B, Adolescent or Pediatric 1996, 1996, 1996    Hib (PRP-OMP) 1996, 1996, 1996, 02/04/1997    INFLUENZA 11/04/2020    IPV 1996, 1996, 1996, 1996, 08/06/1997, 04/25/2001    Influenza, seasonal, injectable 1996    MMR 05/17/1997, 05/17/1997, 04/25/2001    Meningococcal MCV4P 11/22/2011, 11/22/2011    Tdap 11/22/2011, 11/04/2020, 04/25/2024    Tuberculin Skin Test-PPD Intradermal 11/18/1999, 11/18/1999    Varicella 02/04/1997, 11/22/2011, 11/22/2011     Objective   /82 (BP Location: Left arm, Patient Position: Sitting, Cuff Size: Large)   Pulse 92   Temp 99.5 °F (37.5 °C) (Tympanic)   Resp 18   Ht 5' 5\" (1.651 m)   Wt 94 kg (207 lb 3.2 oz)   LMP  (LMP Unknown)   SpO2 98%   BMI 34.48 kg/m²     Physical Exam  Vitals reviewed.   Constitutional:       General: She is not in acute distress.     Appearance: Normal appearance. She is obese. She is not ill-appearing or toxic-appearing.   HENT:      Head: Normocephalic and atraumatic.      Nose: Nose normal.   Eyes:      Conjunctiva/sclera: Conjunctivae normal.   Pulmonary:      Effort: Pulmonary effort is normal. No respiratory distress.   Skin:     General: Skin is warm and dry.      Findings: No rash.   Neurological:      General: No focal deficit present.      Mental Status: She is alert and oriented to person, place, and time.      Gait: Gait normal.   Psychiatric:         Mood and Affect: Mood normal.         Behavior: Behavior normal.         Thought Content: Thought content " normal.       Shoshana Enriquez DO   PGY-2 Rural FM Residency   [facility]

## 2024-12-17 NOTE — ASSESSMENT & PLAN NOTE
-Continue wegovy   -RTC in 6 months   Orders:    Semaglutide-Weight Management (WEGOVY) 1.7 MG/0.75ML; Inject 0.75 mL (1.7 mg total) under the skin once a week

## 2025-01-11 DIAGNOSIS — E66.9 OBESITY (BMI 35.0-39.9 WITHOUT COMORBIDITY): ICD-10-CM

## 2025-01-13 ENCOUNTER — ANNUAL EXAM (OUTPATIENT)
Dept: OBGYN CLINIC | Facility: CLINIC | Age: 30
End: 2025-01-13
Payer: COMMERCIAL

## 2025-01-13 VITALS
SYSTOLIC BLOOD PRESSURE: 110 MMHG | DIASTOLIC BLOOD PRESSURE: 86 MMHG | BODY MASS INDEX: 33.49 KG/M2 | HEIGHT: 65 IN | WEIGHT: 201 LBS

## 2025-01-13 DIAGNOSIS — Z01.419 ENCOUNTER FOR ANNUAL ROUTINE GYNECOLOGICAL EXAMINATION: Primary | ICD-10-CM

## 2025-01-13 DIAGNOSIS — N93.9 ABNORMAL UTERINE BLEEDING: ICD-10-CM

## 2025-01-13 DIAGNOSIS — E66.9 OBESITY (BMI 35.0-39.9 WITHOUT COMORBIDITY): ICD-10-CM

## 2025-01-13 PROCEDURE — S0612 ANNUAL GYNECOLOGICAL EXAMINA: HCPCS | Performed by: OBSTETRICS & GYNECOLOGY

## 2025-01-13 RX ORDER — SEMAGLUTIDE 1.7 MG/.75ML
INJECTION, SOLUTION SUBCUTANEOUS
Qty: 4 ML | Refills: 0 | OUTPATIENT
Start: 2025-01-13

## 2025-01-13 RX ORDER — TRANEXAMIC ACID 650 MG/1
1300 TABLET ORAL 3 TIMES DAILY
Qty: 30 TABLET | Refills: 6 | Status: SHIPPED | OUTPATIENT
Start: 2025-01-13 | End: 2025-01-18

## 2025-01-13 NOTE — ASSESSMENT & PLAN NOTE
Will repeat pelvic US. Discussed previous US suggestive of adenomyosis. Reviewed potential management strategies. Pt had issues with hormonal contraception and Mirena IUD previously. Interested in TXA - rx sent. Will f/u based on US results

## 2025-01-13 NOTE — PROGRESS NOTES
"  Subjective      Delphine Carney is a 29 y.o. female who presents for annual exam.      Chief Complaint   Patient presents with    Gynecologic Exam     Pt reports very painful and heavy periods that typically last 1-2 weeks.      Delivered 2024  Menses returned after weaning around 2 months postpartum  Heavy menses requiring pads and tampons simultaneously with clots, sometimes lasting 2 weeks   Reports h/o heavy menses previously     Reports issues with homronal contraception in the past - weight gain, mood issues  Mirena IUD had persistent bleeding while it was in place despite adequate position on US  Interval is regular overall     pelvic US 2023 - adenomyosis, possible submucosal fibroid \"in upper portion of endocervical canal\"       Last Pap: 2023 NILM       HPV vaccine completed:yes   Current contraception:  bilateral salpingectomy   History of abnormal Pap smear: no  History of abnormal mammogram: no      Family history of uterine or ovarian cancer: great aunt - ovarian   Family history of breast cancer: no  Family history of colon cancer: no      Menstrual History:  OB History          2    Para   2    Term   2            AB        Living   2         SAB        IAB        Ectopic        Multiple        Live Births   2                    Patient's last menstrual period was 2024 (exact date).         Past Medical History:   Diagnosis Date    Asthma     Hip instability     History of gestational hypertension 2024    Dx at 31 wks. Weighed 70 lbs more at that point. Had medical weight loss       History of postpartum hemorrhage 2024    Preeclampsia 05/10/2024    Multiple mildly elevated BP's at home and at 34 weeks, 35 week office visits   UPC at 33 weeks 0.31  - twice weekly surveillance  - weekly labs  - delivery at 37 weeks        Past Surgical History:   Procedure Laterality Date     SECTION      [place]  DELIVERY ONLY N/A 6/10/2024    Procedure: "  SECTION () REPEAT;  Surgeon: Kaila Porter MD;  Location: [facility];  Service: Obstetrics    TUBAL LIGATION N/A 6/10/2024    Procedure: LIGATION/COAGULATION TUBAL;  Surgeon: Kaila Porter MD;  Location: [facility];  Service: Obstetrics     Family History   Problem Relation Age of Onset    Allergy (severe) Mother         Mold    Deep vein thrombosis Father 40        after surgery    Seizures Sister     Thyroid disease Sister     Thyroid cancer Sister     Pulmonary embolism Neg Hx     Stroke Neg Hx     Heart attack Neg Hx     Diabetes Neg Hx        Social History     Tobacco Use    Smoking status: Never     Passive exposure: Never    Smokeless tobacco: Never   Vaping Use    Vaping status: Never Used   Substance Use Topics    Alcohol use: Not Currently    Drug use: Never          Current Outpatient Medications:     albuterol (PROVENTIL HFA,VENTOLIN HFA) 90 mcg/act inhaler, Inhale 2 puffs every 6 (six) hours as needed, Disp: , Rfl:     cetirizine (ZyrTEC) 10 mg tablet, Take 10 mg by mouth daily, Disp: , Rfl:     escitalopram (LEXAPRO) 20 mg tablet, Take 1 tablet (20 mg total) by mouth daily, Disp: 60 tablet, Rfl: 0    fluticasone (FLONASE) 50 mcg/act nasal spray, 1 spray into each nostril daily, Disp: , Rfl:     Prenatal Vit-Fe Fumarate-FA (PRENATAL 1 PLUS 1 PO), Take 1 tablet by mouth daily, Disp: , Rfl:     Semaglutide-Weight Management (WEGOVY) 1.7 MG/0.75ML, Inject 0.75 mL (1.7 mg total) under the skin once a week, Disp: 3 mL, Rfl: 0    Tranexamic Acid 650 MG TABS, Take 2 tablets (1,300 mg total) by mouth 3 (three) times a day for 5 days, Disp: 30 tablet, Rfl: 6    Blood Pressure Monitoring (Blood Pressure Cuff) MISC, Use in the morning (Patient not taking: Reported on 2025), Disp: 1 each, Rfl: 0    EPINEPHrine (EPIPEN) 0.3 mg/0.3 mL SOAJ, Inject 0.3 mL (0.3 mg total) into a muscle once for 1 dose, Disp: 0.6 mL, Rfl: 0    escitalopram (Lexapro) 10 mg tablet, Take 1 tablet (10 mg total) by  "mouth daily, Disp: 30 tablet, Rfl: 0    Allergies   Allergen Reactions    Nuts - Food Allergy Throat Swelling     Almonds, hazelnuts and peanuts    Other Itching     Seasonal pollin allergies    Soybean-Containing Drug Products - Food Allergy GI Intolerance    Apple - Food Allergy     Shellfish Allergy - Food Allergy     Short Ragweed Pollen Ext     Tree Extract     Uncaria Tomentosa (Cats Claw) Allergic Rhinitis           Review of Systems   Constitutional:  Negative for appetite change, chills and fever.   Eyes:  Negative for visual disturbance.   Respiratory:  Negative for cough, chest tightness and shortness of breath.    Cardiovascular:  Negative for chest pain.   Gastrointestinal:  Negative for abdominal distention, abdominal pain, constipation, diarrhea, nausea and vomiting.   Endocrine: Negative for cold intolerance and heat intolerance.   Genitourinary:  Negative for difficulty urinating, dyspareunia, dysuria, frequency, genital sores, pelvic pain, urgency, vaginal bleeding, vaginal discharge and vaginal pain.   Musculoskeletal:  Negative for arthralgias.   Neurological:  Negative for light-headedness and headaches.   Hematological:  Does not bruise/bleed easily.   Psychiatric/Behavioral:  Negative for behavioral problems.    All other systems reviewed and are negative.      /86 (BP Location: Right arm, Patient Position: Sitting, Cuff Size: Adult)   Ht 5' 5\" (1.651 m)   Wt 91.2 kg (201 lb)   LMP 12/26/2024 (Exact Date)   BMI 33.45 kg/m²         Physical Exam  Constitutional:       General: She is not in acute distress.     Appearance: Normal appearance.   Genitourinary:      Vulva, bladder and urethral meatus normal.      No lesions in the vagina.      Right Labia: No rash, tenderness, lesions or skin changes.     Left Labia: No tenderness, lesions, skin changes or rash.     No labial fusion noted.      No inguinal adenopathy present in the right or left side.     No vaginal discharge, erythema, " tenderness, bleeding or ulceration.      No vaginal prolapse present.       Right Adnexa: not tender, not full and no mass present.     Left Adnexa: not tender, not full and no mass present.     No cervical motion tenderness, discharge, friability, lesion or polyp.      Uterus is not enlarged, fixed, tender or irregular.      No uterine mass detected.     Uterus is anteverted.      Pelvic exam was performed with patient in the lithotomy position.   Breasts:     Right: No swelling, bleeding, inverted nipple, mass, nipple discharge, skin change or tenderness.      Left: No swelling, bleeding, inverted nipple, mass, nipple discharge, skin change or tenderness.   HENT:      Head: Normocephalic and atraumatic.   Neck:      Thyroid: No thyromegaly.   Cardiovascular:      Rate and Rhythm: Normal rate and regular rhythm.   Pulmonary:      Effort: Pulmonary effort is normal. No accessory muscle usage or respiratory distress.   Abdominal:      General: There is no distension.      Palpations: Abdomen is soft.      Tenderness: There is no abdominal tenderness. There is no guarding or rebound.   Musculoskeletal:         General: Normal range of motion.      Cervical back: Normal range of motion and neck supple.   Lymphadenopathy:      Upper Body:      Right upper body: No supraclavicular or axillary adenopathy.      Left upper body: No supraclavicular or axillary adenopathy.      Lower Body: No right inguinal and no right inguinal adenopathy. No left inguinal and no left inguinal adenopathy.   Neurological:      General: No focal deficit present.      Mental Status: She is alert.   Skin:     General: Skin is warm and dry.      Findings: No erythema.   Psychiatric:         Mood and Affect: Mood normal.         Behavior: Behavior normal.   Vitals and nursing note reviewed. Exam conducted with a chaperone present.               Abnormal uterine bleeding  Will repeat pelvic US. Discussed previous US suggestive of adenomyosis.  Reviewed potential management strategies. Pt had issues with hormonal contraception and Mirena IUD previously. Interested in TXA - rx sent. Will f/u based on US results     Encounter for annual routine gynecological examination  - Discussed ACOG guidelines for pap smear screening frequency: not indicated today. Recommend repeat pap smear in 2026  - Discussed healthy lifestyle recommendations for diet, exercise and self breast awareness.  - Discussed ACOG recommendations for screening mammograms: not indicated today.  - Discussed age based recommendations for adequate calcium and vitamin D intake. No additional osteoporosis screening indicated at this time.  - Discussed ACOG recommendations for colon cancer screening: not indicated at this time.  - Safe sex practices were discussed and STI testing was not desired by the patient  - Contraceptive options were reviewed: s/p bilateral salpingectomy   - Routine follow up in 1 year was recommended or sooner as needed. All questions and concerns were addressed.

## 2025-01-13 NOTE — ASSESSMENT & PLAN NOTE
- Discussed ACOG guidelines for pap smear screening frequency: not indicated today. Recommend repeat pap smear in 2026  - Discussed healthy lifestyle recommendations for diet, exercise and self breast awareness.  - Discussed ACOG recommendations for screening mammograms: not indicated today.  - Discussed age based recommendations for adequate calcium and vitamin D intake. No additional osteoporosis screening indicated at this time.  - Discussed ACOG recommendations for colon cancer screening: not indicated at this time.  - Safe sex practices were discussed and STI testing was not desired by the patient  - Contraceptive options were reviewed: s/p bilateral salpingectomy   - Routine follow up in 1 year was recommended or sooner as needed. All questions and concerns were addressed.

## 2025-01-21 ENCOUNTER — DOCUMENTATION (OUTPATIENT)
Dept: FAMILY MEDICINE CLINIC | Facility: CLINIC | Age: 30
End: 2025-01-21

## 2025-02-12 PROBLEM — Z01.419 ENCOUNTER FOR ANNUAL ROUTINE GYNECOLOGICAL EXAMINATION: Status: RESOLVED | Noted: 2025-01-13 | Resolved: 2025-02-12

## 2025-02-28 ENCOUNTER — NURSE TRIAGE (OUTPATIENT)
Age: 30
End: 2025-02-28

## 2025-02-28 DIAGNOSIS — N93.9 ABNORMAL UTERINE BLEEDING: Primary | ICD-10-CM

## 2025-02-28 RX ORDER — TRANEXAMIC ACID 650 MG/1
1300 TABLET ORAL 3 TIMES DAILY
Qty: 30 TABLET | Refills: 6 | Status: SHIPPED | OUTPATIENT
Start: 2025-02-28 | End: 2025-03-05

## 2025-02-28 NOTE — TELEPHONE ENCOUNTER
"Pt calling in saying that she is having heavy vaginal bleeding, no clots, that just started today. Pt saying the blood went into the toilet as she's unsure how much bleeding there is as put just put a pad on now.  Cramping in lower abdomen 6/10. Pt did get TXA in the past for bleeding pt is requesting the medication now.   Pt denies feeling weak or lightheaded, passed tissue, vaginal discharge, fever      Pt saying she would like to be prescribed TXA, pt was notified a message will be sent to the provider for further recommendations, pt verbalized understanding.     Pt was provided bleeding precautions of:  Please go to the nearest emergency room or call 911 if you have heavy bleeding saturating more then one pad an hour, large clots the size of a golf ball or plum, feeling dizzy or faint, or if you have excruciating abdominal pain, Pt verbalized understanding, no further questions at this time             Answer Assessment - Initial Assessment Questions  1. BLEEDING SEVERITY: \"Describe the bleeding that you are having.\" \"How much bleeding is there?\"       Pt saying the blood went into the toilet as she's unsure how much bleeding there is as put just put a pad on now. That started today     3. MENSTRUAL PERIOD: \"When was the last normal menstrual period?\" \"How is this different than your period?\"      1/27/25.   4. REGULARITY: \"How regular are your periods?\"      Irregular   5. ABDOMEN PAIN: \"Do you have any pain?\" \"How bad is the pain?\"  (e.g., Scale 0-10; none, mild, moderate, or severe)      Cramping in lower abdomen 6/10.   6. PREGNANCY: \"Is there any chance you are pregnant?\" \"When was your last menstrual period?\"      Pt denies     8. HORMONE MEDICINES: \"Are you taking any hormone medicines, prescription or over-the-counter?\" (e.g., birth control pills, estrogen)      Pt denies   9. BLOOD THINNER MEDICINES: \"Do you take any blood thinners?\" (e.g., Coumadin / warfarin, Pradaxa / dabigatran, aspirin)      Pt " "denies   10. CAUSE: \"What do you think is causing the bleeding?\" (e.g., recent gyn surgery, recent gyn procedure; known bleeding disorder, cervical cancer, polycystic ovarian disease, fibroids)          Adenomyosis   11. HEMODYNAMIC STATUS: \"Are you weak or feeling lightheaded?\" If Yes, ask: \"Can you stand and walk normally?\"         Pt denies feeling weak or lightheaded.   12. OTHER SYMPTOMS: \"What other symptoms are you having with the bleeding?\" (e.g., passed tissue, vaginal discharge, fever, menstrual-type cramps)        Pt denies passed tissue, vaginal discharge, fever,    Protocols used: Vaginal Bleeding - Abnormal-Adult-OH    "

## 2025-03-12 ENCOUNTER — HOSPITAL ENCOUNTER (OUTPATIENT)
Dept: ULTRASOUND IMAGING | Facility: HOSPITAL | Age: 30
Discharge: HOME | End: 2025-03-12
Attending: OBSTETRICS & GYNECOLOGY
Payer: COMMERCIAL

## 2025-03-12 DIAGNOSIS — N93.9 ABNORMAL UTERINE BLEEDING: ICD-10-CM

## 2025-03-12 PROCEDURE — 76830 TRANSVAGINAL US NON-OB: CPT

## 2025-03-12 PROCEDURE — 76856 US EXAM PELVIC COMPLETE: CPT

## 2025-03-18 DIAGNOSIS — E66.9 OBESITY (BMI 35.0-39.9 WITHOUT COMORBIDITY): ICD-10-CM

## 2025-03-19 ENCOUNTER — RESULTS FOLLOW-UP (OUTPATIENT)
Dept: OBGYN CLINIC | Facility: CLINIC | Age: 30
End: 2025-03-19

## 2025-03-19 ENCOUNTER — TELEPHONE (OUTPATIENT)
Age: 30
End: 2025-03-19

## 2025-03-19 RX ORDER — SEMAGLUTIDE 1.7 MG/.75ML
INJECTION, SOLUTION SUBCUTANEOUS
Qty: 4 ML | Refills: 0 | Status: SHIPPED | OUTPATIENT
Start: 2025-03-19

## 2025-03-19 NOTE — TELEPHONE ENCOUNTER
Patient states she had US 3/12 and asking for follow up, reviewed with patient that report has not been completed yet.   Patient states she is still in pain in lower abd /pelvis. Patient is not currently bleeding.   Inbasket sent to Dr. Porter

## 2025-04-07 ENCOUNTER — OFFICE VISIT (OUTPATIENT)
Dept: OBGYN CLINIC | Facility: CLINIC | Age: 30
End: 2025-04-07
Payer: COMMERCIAL

## 2025-04-07 ENCOUNTER — TELEPHONE (OUTPATIENT)
Dept: FAMILY MEDICINE CLINIC | Facility: CLINIC | Age: 30
End: 2025-04-07

## 2025-04-07 VITALS
WEIGHT: 197 LBS | DIASTOLIC BLOOD PRESSURE: 84 MMHG | SYSTOLIC BLOOD PRESSURE: 120 MMHG | BODY MASS INDEX: 32.82 KG/M2 | HEIGHT: 65 IN

## 2025-04-07 DIAGNOSIS — N93.9 ABNORMAL UTERINE BLEEDING: Primary | ICD-10-CM

## 2025-04-07 DIAGNOSIS — E66.9 OBESITY (BMI 35.0-39.9 WITHOUT COMORBIDITY): Primary | ICD-10-CM

## 2025-04-07 PROCEDURE — 99213 OFFICE O/P EST LOW 20 MIN: CPT | Performed by: OBSTETRICS & GYNECOLOGY

## 2025-04-07 NOTE — ASSESSMENT & PLAN NOTE
Discussed with patient normal menstrual cycles that normal cycles typically typically lasts from 4-8 days and are occurring in regular intervals, generally every 25-35 days.  Clinically, excessive blood loss is defined as volume that interferes with the patient's quality of life.    Common etiologies of heavy menstrual or irregular bleeding is mot commonly due to uterine fibroids, endometrial polyps, adenomyosis, bleeding disorders, endometrial hyperplasia or carcinoma, cervicitis, or endometritis.  Irregular bleeding may also be due to thyroid disorders, anovulatory cycles or ovulatory dysfunction.    Reviewed initial laboratory testing and imaging as indicated.     Discussed potential treatment options for abnormal uterine bleeding such as it estrogen progesterone contraceptives or levonorgestrel Intrauterine device.  All other options discussed with patient may be high-dose progestin or injectable progestin or nonsteroidal anti-inflammatory drugs or tranexamic acid or GnRH agonists/antagonists. Reviewed surgical options based on whether or not fertility preservation is desired including but not limited to hysteroscopic approaches, myomectomy, endometrial ablation, uterine artery embolization, and hysterectomy.     Patient was advised to complete appropriate testing and she return to the office for discussion of results and coordination of care as well as to discuss further diagnostic and treatment options if needed.     Pt likely interested in definitive management with hysterectomy. She will return for EMB and further discussion

## 2025-04-07 NOTE — PROGRESS NOTES
Subjective   Patient ID: Delphine Carney is a 30 y.o. female.    Patient is here for a problem visit.    Chief Complaint   Patient presents with    Consult     US Results Review, concerns of irregular bleeding every 2 weeks a period starts      Delivered by repeat C/S with BS 2024  In January she was seen for annual and described heavy menses but regular interval  subsequent pelvic US was normal, without clear evidence of adenomyosis   She was prescribed TXA     First 5 days with TXA bleeding is mangeable, then still lasts for 5-6 days afterwards, very heavy last half of menses, passage of clots. Last cycle had an accident while she was at a birthday party   Interval is about 3 weeks apart   Cramping is worse the latter part of menses   Still intermittently gets soreness around incision with certain movements  Similar discomfort with intercourse so not SA often         Menstrual History:  OB History          2    Para   2    Term   2            AB        Living   2         SAB        IAB        Ectopic        Multiple        Live Births   2                  Patient's last menstrual period was 2025 (exact date).         Past Medical History:   Diagnosis Date    Allergic     Anxiety     Asthma     Hip instability     History of gestational hypertension 2024    Dx at 31 wks. Weighed 70 lbs more at that point. Had medical weight loss       History of postpartum hemorrhage 2024    Preeclampsia 05/10/2024    Multiple mildly elevated BP's at home and at 34 weeks, 35 week office visits   UPC at 33 weeks 0.31  - twice weekly surveillance  - weekly labs  - delivery at 37 weeks          Past Surgical History:   Procedure Laterality Date     SECTION      [place]  DELIVERY ONLY N/A 6/10/2024    Procedure:  SECTION () REPEAT;  Surgeon: Kaila Porter MD;  Location: [facility];  Service: Obstetrics    TUBAL LIGATION N/A 6/10/2024    Procedure:  LIGATION/COAGULATION TUBAL;  Surgeon: Kaila Porter MD;  Location: [facility];  Service: Obstetrics       Social History     Tobacco Use    Smoking status: Never     Passive exposure: Never    Smokeless tobacco: Never   Vaping Use    Vaping status: Never Used   Substance Use Topics    Alcohol use: Not Currently    Drug use: Never        Allergies   Allergen Reactions    Nuts - Food Allergy Throat Swelling     Almonds, hazelnuts and peanuts    Other Itching     Seasonal pollin allergies    Soybean-Containing Drug Products - Food Allergy GI Intolerance    Apple - Food Allergy     Shellfish Allergy - Food Allergy     Short Ragweed Pollen Ext     Tree Extract     Uncaria Tomentosa (Cats Claw) Allergic Rhinitis         Current Outpatient Medications:     albuterol (PROVENTIL HFA,VENTOLIN HFA) 90 mcg/act inhaler, Inhale 2 puffs every 6 (six) hours as needed, Disp: , Rfl:     cetirizine (ZyrTEC) 10 mg tablet, Take 10 mg by mouth daily, Disp: , Rfl:     fluticasone (FLONASE) 50 mcg/act nasal spray, 1 spray into each nostril daily, Disp: , Rfl:     Prenatal Vit-Fe Fumarate-FA (PRENATAL 1 PLUS 1 PO), Take 1 tablet by mouth daily, Disp: , Rfl:     Semaglutide-Weight Management (WEGOVY) 2.4 MG/0.75ML, Inject 0.75 mL (2.4 mg total) under the skin once a week, Disp: 3 mL, Rfl: 1    Blood Pressure Monitoring (Blood Pressure Cuff) MISC, Use in the morning (Patient not taking: Reported on 4/7/2025), Disp: 1 each, Rfl: 0    EPINEPHrine (EPIPEN) 0.3 mg/0.3 mL SOAJ, Inject 0.3 mL (0.3 mg total) into a muscle once for 1 dose, Disp: 0.6 mL, Rfl: 0    escitalopram (Lexapro) 10 mg tablet, Take 1 tablet (10 mg total) by mouth daily, Disp: 30 tablet, Rfl: 0    escitalopram (LEXAPRO) 20 mg tablet, Take 1 tablet (20 mg total) by mouth daily, Disp: 60 tablet, Rfl: 0      Review of Systems   Constitutional:  Negative for appetite change, chills and fever.   Eyes:  Negative for visual disturbance.   Respiratory:  Negative for cough, chest  "tightness and shortness of breath.    Cardiovascular:  Negative for chest pain.   Gastrointestinal:  Negative for abdominal distention, abdominal pain, constipation, diarrhea, nausea and vomiting.   Endocrine: Negative for cold intolerance and heat intolerance.   Genitourinary:  Positive for menstrual problem and pelvic pain. Negative for difficulty urinating, dyspareunia, dysuria, frequency, genital sores, urgency, vaginal bleeding, vaginal discharge and vaginal pain.   Musculoskeletal:  Negative for arthralgias.   Neurological:  Negative for light-headedness and headaches.   Hematological:  Does not bruise/bleed easily.   Psychiatric/Behavioral:  Negative for behavioral problems.    All other systems reviewed and are negative.        /84 (Patient Position: Sitting, Cuff Size: Standard)   Ht 5' 5\" (1.651 m)   Wt 89.4 kg (197 lb)   LMP 03/23/2025 (Exact Date)   BMI 32.78 kg/m²       Physical Exam  Constitutional:       General: She is not in acute distress.     Appearance: Normal appearance.   HENT:      Head: Normocephalic and atraumatic.   Cardiovascular:      Rate and Rhythm: Normal rate.   Pulmonary:      Effort: Pulmonary effort is normal. No respiratory distress.   Neurological:      General: No focal deficit present.      Mental Status: She is alert.   Psychiatric:         Mood and Affect: Mood normal.         Behavior: Behavior normal.   Vitals and nursing note reviewed.               Appropriate laboratory testing, imaging studies, and prior external records were reviewed:     Assessment/Plan:       Problem List Items Addressed This Visit       Abnormal uterine bleeding - Primary    Discussed with patient normal menstrual cycles that normal cycles typically typically lasts from 4-8 days and are occurring in regular intervals, generally every 25-35 days.  Clinically, excessive blood loss is defined as volume that interferes with the patient's quality of life.    Common etiologies of heavy menstrual " or irregular bleeding is mot commonly due to uterine fibroids, endometrial polyps, adenomyosis, bleeding disorders, endometrial hyperplasia or carcinoma, cervicitis, or endometritis.  Irregular bleeding may also be due to thyroid disorders, anovulatory cycles or ovulatory dysfunction.    Reviewed initial laboratory testing and imaging as indicated.     Discussed potential treatment options for abnormal uterine bleeding such as it estrogen progesterone contraceptives or levonorgestrel Intrauterine device.  All other options discussed with patient may be high-dose progestin or injectable progestin or nonsteroidal anti-inflammatory drugs or tranexamic acid or GnRH agonists/antagonists. Reviewed surgical options based on whether or not fertility preservation is desired including but not limited to hysteroscopic approaches, myomectomy, endometrial ablation, uterine artery embolization, and hysterectomy.     Patient was advised to complete appropriate testing and she return to the office for discussion of results and coordination of care as well as to discuss further diagnostic and treatment options if needed.     Pt likely interested in definitive management with hysterectomy. She will return for EMB and further discussion          Relevant Orders    CBC and Platelet    TSH, 3rd generation with Free T4 reflex

## 2025-04-07 NOTE — TELEPHONE ENCOUNTER
Received the following message from patient regarding Wegovy:   Hi my name is Delphine Carney. My birthday is [birth year]. I'm calling about my Wegovy prescription. I was wondering if I would be able to go up to the 2.4 milligram I'm nearing. I think I have one more pen of my 1.7 but I'm thinking kind of starting to need that extra. My phone number is [phone]. I'd greatly appreciate it if I got a call back. Thank you.  Please advise.

## 2025-06-05 DIAGNOSIS — E66.9 OBESITY (BMI 35.0-39.9 WITHOUT COMORBIDITY): ICD-10-CM

## 2025-06-08 RX ORDER — SEMAGLUTIDE 2.4 MG/.75ML
INJECTION, SOLUTION SUBCUTANEOUS
Qty: 4 ML | Refills: 0 | Status: SHIPPED | OUTPATIENT
Start: 2025-06-08

## 2025-06-12 ENCOUNTER — PROCEDURE VISIT (OUTPATIENT)
Dept: OBGYN CLINIC | Facility: CLINIC | Age: 30
End: 2025-06-12
Payer: COMMERCIAL

## 2025-06-12 VITALS
BODY MASS INDEX: 30.39 KG/M2 | SYSTOLIC BLOOD PRESSURE: 118 MMHG | WEIGHT: 182.4 LBS | DIASTOLIC BLOOD PRESSURE: 72 MMHG | HEIGHT: 65 IN

## 2025-06-12 DIAGNOSIS — Z32.02 ENCOUNTER FOR PREGNANCY TEST, RESULT NEGATIVE: Primary | ICD-10-CM

## 2025-06-12 DIAGNOSIS — N93.9 ABNORMAL UTERINE BLEEDING: ICD-10-CM

## 2025-06-12 LAB — SL AMB POCT URINE HCG: NEGATIVE

## 2025-06-12 PROCEDURE — 81025 URINE PREGNANCY TEST: CPT | Performed by: OBSTETRICS & GYNECOLOGY

## 2025-06-12 PROCEDURE — 88305 TISSUE EXAM BY PATHOLOGIST: CPT | Performed by: STUDENT IN AN ORGANIZED HEALTH CARE EDUCATION/TRAINING PROGRAM

## 2025-06-12 PROCEDURE — 58100 BIOPSY OF UTERUS LINING: CPT | Performed by: OBSTETRICS & GYNECOLOGY

## 2025-06-12 PROCEDURE — 99214 OFFICE O/P EST MOD 30 MIN: CPT | Performed by: OBSTETRICS & GYNECOLOGY

## 2025-06-12 NOTE — PATIENT INSTRUCTIONS
Patients are given 3, ten ounce bottles of ensure and written instructions.     Patients drink:   One bottle, one hour after dinner the night before surgery   One bottle, one hour before bedtime the night before surgery   One bottle, one hour before the scheduled arrive time on the day of surgery.    Need to get labwork done no more than 30 days prior to day of surgery - labs need to be done at a [facility]'s lab (not [facility], [facility], [facility], etc).   Preadmission testing will call 1 week and 1 day prior to surgery to review instructions

## 2025-06-12 NOTE — ASSESSMENT & PLAN NOTE
Patient has trialed multiple hormonal and other agents without improvement in sx. Recent pelvic US showed no structural abnormalities. EMB collected today. She is interested in definitive management of AUB with hysterectomy     Reviewed risks associated with exam under anesthesia, total laparoscopic hysterectomy,  and cystoscopy including pain, bleeding, infection, damage to surrounding structures/inadvertant visceral or other injury requiring laparotomy or additional procedures, persistence of chronic pelvic pain after hysterectomy, as well as DVT/PE or MI/stroke. Discussed opportunistic salpingectomy at time of hysterectomy for prevention of epithelial carcinoma of the fallopian tube and ovary. Discussed ovarian conservation at the time of benign hysterectomy until age 65 benefits long term survival, although more recent data suggests that women who have had a hysterectomy with or without an oophorectomy at great than 50 years of age had similar survival to age 80. Oophorectomy can increase the long-term risk of cardiovascular disease/mortality and all-cause mortality. Given she does not have risk factors for ovarian cancer or other indication for oophorectomy and she elects for ovarian conservation.  Reviewed that a pelvic exam under anesthesia is within the scope of care for the above surgical procedure. Patient voiced understanding of the above and desires to proceed.  - e consent signed  - preop instructions reviewed, wash given  - surgical scheduler to contact

## 2025-06-12 NOTE — PROGRESS NOTES
Subjective   Patient ID: Delphine Carney is a 30 y.o. female.    Patient is here for a problem visit.    Chief Complaint   Patient presents with    Procedure     Endometrial biopsy. 2 ibuprofen about an hour ago. No other concerns      menses lasting 9-14 days, heavy for first week or so with accidents. Lot of cramping   TXA not helping  Impacting quality of life   S/p bilateral salpingectomy at time of last C section     Menstrual History:  OB History          2    Para   2    Term   2            AB        Living   2         SAB        IAB        Ectopic        Multiple        Live Births   2                  Patient's last menstrual period was 2025 (approximate).         Past Medical History[1]    Past Surgical History[2]    Social History[3]     Allergies   Allergen Reactions    Nuts - Food Allergy Throat Swelling     Almonds, hazelnuts and peanuts    Other Itching     Seasonal pollin allergies    Soybean-Containing Drug Products - Food Allergy GI Intolerance    Apple - Food Allergy     Shellfish Allergy - Food Allergy     Short Ragweed Pollen Ext     Tree Extract     Uncaria Tomentosa (Cats Claw) Allergic Rhinitis       Current Medications[4]      Review of Systems   Constitutional:  Negative for appetite change, chills and fever.   Eyes:  Negative for visual disturbance.   Respiratory:  Negative for cough, chest tightness and shortness of breath.    Cardiovascular:  Negative for chest pain.   Gastrointestinal:  Negative for abdominal distention, abdominal pain, constipation, diarrhea, nausea and vomiting.   Endocrine: Negative for cold intolerance and heat intolerance.   Genitourinary:  Negative for difficulty urinating, dyspareunia, dysuria, frequency, genital sores, pelvic pain, urgency, vaginal bleeding, vaginal discharge and vaginal pain.   Musculoskeletal:  Negative for arthralgias.   Neurological:  Negative for light-headedness and headaches.   Hematological:  Does not  "bruise/bleed easily.   Psychiatric/Behavioral:  Negative for behavioral problems.    All other systems reviewed and are negative.        /72 (BP Location: Left arm, Patient Position: Sitting, Cuff Size: Standard)   Ht 5' 5\" (1.651 m)   Wt 82.7 kg (182 lb 6.4 oz)   LMP 05/18/2025 (Approximate)   BMI 30.35 kg/m²       Physical Exam  Constitutional:       General: She is not in acute distress.     Appearance: Normal appearance. She is not ill-appearing.   Genitourinary:      Bladder and urethral meatus normal.      Right Labia: No rash, tenderness, lesions or skin changes.     Left Labia: No tenderness, lesions, skin changes or rash.     No labial fusion noted.      No inguinal adenopathy present in the right or left side.     No vaginal discharge, erythema, tenderness, bleeding or ulceration.        Right Adnexa: not tender, not full and no mass present.     Left Adnexa: not tender, not full and no mass present.     No cervical motion tenderness, discharge, friability, lesion, polyp or eversion.      Uterus is not enlarged, fixed, tender or irregular.      No uterine mass detected.     Uterus is anteverted.      Pelvic exam was performed with patient in the lithotomy position.   HENT:      Head: Normocephalic.     Cardiovascular:      Rate and Rhythm: Normal rate.      Heart sounds: Normal heart sounds.   Pulmonary:      Effort: Pulmonary effort is normal. No accessory muscle usage or respiratory distress.   Abdominal:      General: There is no distension.      Palpations: Abdomen is soft. There is no mass.      Tenderness: There is no abdominal tenderness. There is no guarding or rebound.     Musculoskeletal:         General: Normal range of motion.      Cervical back: No rigidity.   Lymphadenopathy:      Lower Body: No right inguinal adenopathy. No left inguinal adenopathy.     Neurological:      General: No focal deficit present.      Mental Status: She is alert. Mental status is at baseline.     Skin:    "  General: Skin is warm and dry.     Psychiatric:         Mood and Affect: Mood normal.         Behavior: Behavior normal.   Vitals and nursing note reviewed. Exam conducted with a chaperone present.           Results for orders placed or performed in visit on 06/12/25   POCT urine HCG   Result Value Ref Range    URINE HCG negative      Endometrial biopsy    Date/Time: 6/12/2025 4:00 PM    Performed by: Kaila Porter MD  Authorized by: Kaila Porter MD    [place] Protocol:  Consent: Verbal consent obtained. Written consent obtained  Risks and benefits: risks, benefits and alternatives were discussed  Consent given by: patient  Patient understanding: patient states understanding of the procedure being performed  Patient consent: the patient's understanding of the procedure matches consent given  Procedure consent: procedure consent matches procedure scheduled  Relevant documents: relevant documents present and verified  Test results: test results available and properly labeled  Required items: required blood products, implants, devices, and special equipment available    Pre-procedure:     Negative urine pregnancy test: yes    Procedure:     Procedure: endometrial biopsy with Pipelle      A bivalve speculum was placed in the vagina: yes      Cervix cleaned and prepped: yes      The cervix was dilated: no      Uterus sounded: yes      Uterus sound depth (cm):  7    Specimen collected: specimen collected and sent to pathology      Patient tolerated procedure well with no complications: yes          Appropriate laboratory testing, imaging studies, and prior external records were reviewed:     Assessment/Plan:       Problem List Items Addressed This Visit       Abnormal uterine bleeding    Patient has trialed multiple hormonal and other agents without improvement in sx. Recent pelvic US showed no structural abnormalities. EMB collected today. She is interested in definitive management of AUB with hysterectomy      Reviewed risks associated with exam under anesthesia, total laparoscopic hysterectomy,  and cystoscopy including pain, bleeding, infection, damage to surrounding structures/inadvertant visceral or other injury requiring laparotomy or additional procedures, persistence of chronic pelvic pain after hysterectomy, as well as DVT/PE or MI/stroke. Discussed opportunistic salpingectomy at time of hysterectomy for prevention of epithelial carcinoma of the fallopian tube and ovary. Discussed ovarian conservation at the time of benign hysterectomy until age 65 benefits long term survival, although more recent data suggests that women who have had a hysterectomy with or without an oophorectomy at great than 50 years of age had similar survival to age 80. Oophorectomy can increase the long-term risk of cardiovascular disease/mortality and all-cause mortality. Given she does not have risk factors for ovarian cancer or other indication for oophorectomy and she elects for ovarian conservation.  Reviewed that a pelvic exam under anesthesia is within the scope of care for the above surgical procedure. Patient voiced understanding of the above and desires to proceed.  - e consent signed  - preop instructions reviewed, wash given  - surgical scheduler to contact             Relevant Orders    Tissue Exam     Other Visit Diagnoses         Encounter for pregnancy test, result negative    -  Primary    Relevant Orders    POCT urine HCG (Completed)                         [1]   Past Medical History:  Diagnosis Date    Allergic     Anxiety     Asthma     Hip instability     History of gestational hypertension 01/30/2024    Dx at 31 wks. Weighed 70 lbs more at that point. Had medical weight loss       History of postpartum hemorrhage 06/09/2024    Preeclampsia 05/10/2024    Multiple mildly elevated BP's at home and at 34 weeks, 35 week office visits   UPC at 33 weeks 0.31  - twice weekly surveillance  - weekly labs  - delivery at 37  weeks      [2]   Past Surgical History:  Procedure Laterality Date     SECTION      [place]  DELIVERY ONLY N/A 6/10/2024    Procedure:  SECTION () REPEAT;  Surgeon: Kaila Porter MD;  Location: [facility];  Service: Obstetrics    TUBAL LIGATION N/A 6/10/2024    Procedure: LIGATION/COAGULATION TUBAL;  Surgeon: Kaila Porter MD;  Location: [facility];  Service: Obstetrics   [3]   Social History  Tobacco Use    Smoking status: Never     Passive exposure: Never    Smokeless tobacco: Never   Vaping Use    Vaping status: Never Used   Substance Use Topics    Alcohol use: Not Currently    Drug use: Never   [4]   Current Outpatient Medications:     albuterol (PROVENTIL HFA,VENTOLIN HFA) 90 mcg/act inhaler, Inhale 2 puffs every 6 (six) hours as needed, Disp: , Rfl:     cetirizine (ZyrTEC) 10 mg tablet, Take 10 mg by mouth in the morning., Disp: , Rfl:     fluticasone (FLONASE) 50 mcg/act nasal spray, 1 spray into each nostril in the morning., Disp: , Rfl:     Wegovy 2.4 MG/0.75ML, INJECT 0.75ML (2.4MG TOTAL) UNDER THE SKIN ONCE A WEEK, Disp: 4 mL, Rfl: 0    Blood Pressure Monitoring (Blood Pressure Cuff) MISC, Use in the morning (Patient not taking: Reported on 2025), Disp: 1 each, Rfl: 0    EPINEPHrine (EPIPEN) 0.3 mg/0.3 mL SOAJ, Inject 0.3 mL (0.3 mg total) into a muscle once for 1 dose, Disp: 0.6 mL, Rfl: 0    escitalopram (Lexapro) 10 mg tablet, Take 1 tablet (10 mg total) by mouth daily, Disp: 30 tablet, Rfl: 0    escitalopram (LEXAPRO) 20 mg tablet, Take 1 tablet (20 mg total) by mouth daily, Disp: 60 tablet, Rfl: 0    Prenatal Vit-Fe Fumarate-FA (PRENATAL 1 PLUS 1 PO), Take 1 tablet by mouth daily, Disp: , Rfl:

## 2025-06-13 ENCOUNTER — TELEPHONE (OUTPATIENT)
Dept: OBGYN CLINIC | Facility: CLINIC | Age: 30
End: 2025-06-13

## 2025-06-13 ENCOUNTER — E-CONSULT (OUTPATIENT)
Dept: HEMATOLOGY ONCOLOGY | Facility: CLINIC | Age: 30
End: 2025-06-13
Payer: COMMERCIAL

## 2025-06-13 DIAGNOSIS — Z83.2 FAMILY HISTORY OF VON WILLEBRAND DISEASE: Primary | ICD-10-CM

## 2025-06-13 PROCEDURE — 99451 NTRPROF PH1/NTRNET/EHR 5/>: CPT | Performed by: PHYSICIAN ASSISTANT

## 2025-06-13 NOTE — TELEPHONE ENCOUNTER
Called pt to discuss family history of VWD. She had some labwork done during pregnancy which was normal. Her C/S was uncomplicated without excessive blood loss.   No answer, left VM  Will place referral to benign hematology to see if any additional labwork would be recommended in preparation for surgery   [employer] message sent

## 2025-06-13 NOTE — TELEPHONE ENCOUNTER
MD Kendra Damon MA  [facility] OB GYN Department  Surgery Scheduling Sheet    Patient Name: Delphine Carney  : 1995    Provider: Kaila Porter MD     Needed: no; Language: N/A    Procedure: exam under anesthesia, total laparoscopic hysterectomy, and cystoscopy    Diagnosis: abnormal uterine bleeding    Special Needs or Equipment: none    Anesthesia: General anesthesia    Length of stay: outpatient  Does patient have comorbid conditions that will require close perioperative monitoring prior to safe discharge: no    The patient has comorbid conditions that will require close perioperative monitoring prior to safe discharge, including N/A.  This may require acute care beyond the usual and routine recovery period. As such, inpatient admission post-operatively is expected and appropriate, and anticipated hospital length of stay will be >2 midnights.    Pre-Admission Testing Needed: yes  Labs that should be ordered: cbc, hgb A1C, type and screen, and BMP    Order PAT that is recommended in prep for procedure?: Yes    Medical Clearance Needed: no; Provider: N/A    MA Form Signed (tubals/hysterectomy): Not Indicated    Surgical Drink Given: yes    How many days out of work: 2 week(s)    How many days no drivin week(s)      Is pre op appt needed?  no  Interval for post op appt: 2 week(s)      For Surgical Scheduler:    Surgery Scheduled On:  [place]: [facility]    Pre-op Appt:  Post op Appt:  Consult/Medical clearance appt:

## 2025-06-13 NOTE — PROGRESS NOTES
E-Consult  Delphine Carney 30 y.o. female MRN: [id number]  Encounter Date: 06/13/25      Reason for Consult / Principal Problem: Family history of von Willebrand disease    Consulting Provider: Cathi Hickman PA-C    Requesting Provider: Kaila Porter MD       ASSESSMENT:  Family history of von Willebrand disease per chart review  Chart review indicates patient has had 2 C-sections without any prolonged or excessive bleeding.  She has had von Willebrand activity assessed previously and it was normal    RECOMMENDATIONS:  Assess PT, PTT, and INR as I cannot find that this was ever checked   Check von Willebrand activity level    If abnormal, contact hematology again.  If not already normal, there is no need for patient to be seen by hematology    Total time spent >5 min, >50% time spent reviewing/analyzing record, written report will be generated in the EMR. .

## 2025-06-17 PROCEDURE — 88305 TISSUE EXAM BY PATHOLOGIST: CPT | Performed by: STUDENT IN AN ORGANIZED HEALTH CARE EDUCATION/TRAINING PROGRAM

## 2025-06-17 NOTE — TELEPHONE ENCOUNTER
Talked to patient she is scheduled for her surgical procedure on 8/8/2025 at the [place] OR with Dr. Porter. Patient is aware of lab work needed prior to her surgical procedure as well as to hold her Wegovy 7 days prior to her surgical procedure. 2 week post op scheduled for 8/25/2025.

## 2025-06-18 ENCOUNTER — RESULTS FOLLOW-UP (OUTPATIENT)
Dept: OBGYN CLINIC | Facility: CLINIC | Age: 30
End: 2025-06-18

## 2025-07-02 ENCOUNTER — OFFICE VISIT (OUTPATIENT)
Dept: FAMILY MEDICINE CLINIC | Facility: CLINIC | Age: 30
End: 2025-07-02
Payer: COMMERCIAL

## 2025-07-02 VITALS
RESPIRATION RATE: 16 BRPM | TEMPERATURE: 99.8 F | SYSTOLIC BLOOD PRESSURE: 100 MMHG | DIASTOLIC BLOOD PRESSURE: 80 MMHG | BODY MASS INDEX: 29.92 KG/M2 | HEART RATE: 72 BPM | WEIGHT: 179.6 LBS | OXYGEN SATURATION: 99 % | HEIGHT: 65 IN

## 2025-07-02 DIAGNOSIS — E66.3 OVERWEIGHT (BMI 25.0-29.9): Primary | ICD-10-CM

## 2025-07-02 DIAGNOSIS — F41.9 ANXIETY: ICD-10-CM

## 2025-07-02 PROCEDURE — 99213 OFFICE O/P EST LOW 20 MIN: CPT | Performed by: STUDENT IN AN ORGANIZED HEALTH CARE EDUCATION/TRAINING PROGRAM

## 2025-07-02 RX ORDER — ESCITALOPRAM OXALATE 20 MG/1
20 TABLET ORAL DAILY
Qty: 60 TABLET | Refills: 0 | Status: SHIPPED | OUTPATIENT
Start: 2025-07-02 | End: 2025-08-31

## 2025-07-02 NOTE — PROGRESS NOTES
"Name: Delphine Carney      : 1995      MRN: [id number]  Encounter Provider: Shoshana Enriquez DO  Encounter Date: 2025   Encounter department: [facility]  :  Assessment & Plan  Anxiety  Pt has stopped taking her lexapro in May after she ran out of her prescription. She denies any medication side effects or withdrawal symptoms. She stopped taking the meds because she was overwhelmed with her child's recent health issues. She felt more relaxed on the lexapro. She has been feeling more anxious and \"all over the place\" for the past 2 weeks and wishes to restart lexapro. Restart lexapro.   Orders:    escitalopram (LEXAPRO) 20 mg tablet; Take 1 tablet (20 mg total) by mouth daily    Overweight (BMI 25.0-29.9)  Pt has been experiencing increased nausea and decreased weight loss with consistent diet and exercise. She wishes to switch from wegovy to zepbound after her hysterectomy in August. Continue wegovy until 2 weeks before surgery. Stop wegovy and schedule weight check 1 week post-op. Start zepbound for continual weight loss to pt's goal weight 140-150.               History of Present Illness   Delphine Carney is a 31 yo F with a PMH of adenomyosis, anxiety, and obesity presenting today with concerns about her wegovy and lexapro prescription. She has no other complaints today.   Pt has been taking wegovy for the past year with successful weight loss of about 60 lbs with consistent diet and exercise. For the past 3 weeks, she has been experiencing new nausea after her wegovy injections. She has also felt that he weight loss has plateaued over the last 2 months and she is not losing as much weight as she hoped despite continued diet and exercise. Her goal weight is 140-150 lbs. She is scheduled for a hysterectomy in August for her adenomyosis and must stop wegovy two weeks before the operation. She is hoping to switch to zepbound after her operation and continue her weight " "loss to her goal weight.   Pt has stopped taking her lexapro in May after she ran out of her prescription. She denies any medication side effects or withdrawal symptoms. She stopped taking the meds because she was overwhelmed with her child's recent health issues. She felt more relaxed on the lexapro. She has been feeling more anxious and \"all over the place\" for the past 2 weeks and wishes to restart the lexapro.     Anxiety  Symptoms include nausea. Patient reports no chest pain, palpitations or shortness of breath.         Review of Systems   Constitutional:  Negative for activity change and appetite change.   Respiratory:  Negative for cough and shortness of breath.    Cardiovascular:  Negative for chest pain, palpitations and leg swelling.   Gastrointestinal:  Positive for nausea. Negative for abdominal pain, constipation, diarrhea and vomiting.   Neurological:  Negative for syncope, weakness and headaches.   Psychiatric/Behavioral:  Negative for agitation and behavioral problems.        Objective   /80 (BP Location: Left arm, Patient Position: Sitting, Cuff Size: Standard)   Pulse 72   Temp 99.8 °F (37.7 °C) (Tympanic)   Resp 16   Ht 5' 5\" (1.651 m)   Wt 81.5 kg (179 lb 9.6 oz)   LMP 05/18/2025 (Approximate)   SpO2 99%   BMI 29.89 kg/m²      Physical Exam  Constitutional:       General: She is not in acute distress.     Appearance: Normal appearance.   HENT:      Head: Normocephalic and atraumatic.     Cardiovascular:      Rate and Rhythm: Normal rate and regular rhythm.      Heart sounds: Normal heart sounds. No murmur heard.  Pulmonary:      Effort: Pulmonary effort is normal. No respiratory distress.      Breath sounds: Normal breath sounds. No stridor. No wheezing.   Abdominal:      General: Abdomen is flat. There is no distension.      Palpations: Abdomen is soft. There is no mass.      Tenderness: There is no abdominal tenderness. There is no guarding.     Skin:     General: Skin is warm " and dry.      Capillary Refill: Capillary refill takes less than 2 seconds.     Neurological:      Mental Status: She is alert and oriented to person, place, and time. Mental status is at baseline.     Psychiatric:         Mood and Affect: Mood normal.         Behavior: Behavior normal.

## 2025-07-02 NOTE — PROGRESS NOTES
Name: Delphine Carney      : 1995      MRN: [id number]  Encounter Provider: Shoshana Enriquez DO  Encounter Date: 2025   Encounter department: [facility]    Assessment & Plan  Anxiety    Orders:    escitalopram (LEXAPRO) 20 mg tablet; Take 1 tablet (20 mg total) by mouth daily         History of Present Illness   {?Quick Links Encounters * My Last Note * Last Note in Specialty * Snapshot * Since Last Visit * History :36122}  HPI  Review of Systems  Past Medical History[1]  Past Surgical History[2]  Family History[3]  Social History[4]  Medications[5]  Allergies   Allergen Reactions    Nuts - Food Allergy Throat Swelling     Almonds, hazelnuts and peanuts    Other Itching     Seasonal pollin allergies    Soybean-Containing Drug Products - Food Allergy GI Intolerance    Apple - Food Allergy     Shellfish Allergy - Food Allergy     Short Ragweed Pollen Ext     Tree Extract     Uncaria Tomentosa (Cats Claw) Allergic Rhinitis     Immunization History   Administered Date(s) Administered    COVID-19 MODERNA VACC 0.5 ML IM 03/10/2021, 03/10/2021, 2021, 2021, 01/10/2022, 01/10/2022    DTaP 1995, 2001    DTaP 5 1996, 1996, 1996, 1997, 2001    Hep B, Adolescent or Pediatric 1995, 1996, 1996    Hib (PRP-OMP) 1996, 1996, 1996, 1997    INFLUENZA 2020    IPV 1996, 1996, 1996, 1996, 1997, 2001    Influenza, seasonal, injectable 1996    MMR 1997, 1997, 2001    Meningococcal MCV4P 2011, 2011    Tdap 2011, 2020, 2024    Tuberculin Skin Test-PPD Intradermal 1999, 1999    Varicella 1997, 2011, 2011     Objective {?Quick Links Trend Vitals * Enter New Vitals * Results Review * Timeline (Adult) * Labs * Imaging * Cardiology * Procedures * Lung Cancer Screening * Surgical  "eConsent :10357}  /80 (BP Location: Left arm, Patient Position: Sitting, Cuff Size: Standard)   Pulse 72   Temp 99.8 °F (37.7 °C) (Tympanic)   Resp 16   Ht 5' 5\" (1.651 m)   Wt 81.5 kg (179 lb 9.6 oz)   LMP 2025 (Approximate)   SpO2 99%   BMI 29.89 kg/m²     Physical Exam  {Administrative / Billing Section (Optional):84242}         [1]   Past Medical History:  Diagnosis Date    Allergic     Anxiety     Asthma     Hip instability     History of gestational hypertension 2024    Dx at 31 wks. Weighed 70 lbs more at that point. Had medical weight loss       History of postpartum hemorrhage 2024    Preeclampsia 05/10/2024    Multiple mildly elevated BP's at home and at 34 weeks, 35 week office visits   UPC at 33 weeks 0.31  - twice weekly surveillance  - weekly labs  - delivery at 37 weeks      [2]   Past Surgical History:  Procedure Laterality Date     SECTION      [place]  DELIVERY ONLY N/A 6/10/2024    Procedure:  SECTION () REPEAT;  Surgeon: Kaila Porter MD;  Location: [facility];  Service: Obstetrics    TUBAL LIGATION N/A 6/10/2024    Procedure: LIGATION/COAGULATION TUBAL;  Surgeon: Kaila Porter MD;  Location: [facility];  Service: Obstetrics   [3]   Family History  Problem Relation Name Age of Onset    Allergy (severe) Mother          Mold    Deep vein thrombosis Father Dung Webb 40        after surgery    Seizures Sister CaileeBittner     Thyroid disease Sister CaileeBittner     Thyroid cancer Sister      Pulmonary embolism Neg Hx      Stroke Neg Hx      Heart attack Neg Hx      Diabetes Neg Hx     [4]   Social History  Tobacco Use    Smoking status: Never     Passive exposure: Never    Smokeless tobacco: Never   Vaping Use    Vaping status: Never Used   Substance and Sexual Activity    Alcohol use: Not Currently    Drug use: Never    Sexual activity: Yes     Partners: Male     Birth control/protection: None   [5]   Current Outpatient Medications on " File Prior to Visit   Medication Sig    albuterol (PROVENTIL HFA,VENTOLIN HFA) 90 mcg/act inhaler Inhale 2 puffs every 6 (six) hours as needed    cetirizine (ZyrTEC) 10 mg tablet Take 10 mg by mouth in the morning.    fluticasone (FLONASE) 50 mcg/act nasal spray 1 spray into each nostril in the morning.    Prenatal Vit-Fe Fumarate-FA (PRENATAL 1 PLUS 1 PO) Take 1 tablet by mouth in the morning.    Wegovy 2.4 MG/0.75ML INJECT 0.75ML (2.4MG TOTAL) UNDER THE SKIN ONCE A WEEK    Blood Pressure Monitoring (Blood Pressure Cuff) MISC Use in the morning (Patient not taking: Reported on 1/13/2025)    EPINEPHrine (EPIPEN) 0.3 mg/0.3 mL SOAJ Inject 0.3 mL (0.3 mg total) into a muscle once for 1 dose    escitalopram (Lexapro) 10 mg tablet Take 1 tablet (10 mg total) by mouth daily    escitalopram (LEXAPRO) 20 mg tablet Take 1 tablet (20 mg total) by mouth daily

## 2025-07-09 DIAGNOSIS — E66.9 OBESITY (BMI 35.0-39.9 WITHOUT COMORBIDITY): ICD-10-CM

## 2025-07-09 RX ORDER — SEMAGLUTIDE 2.4 MG/.75ML
INJECTION, SOLUTION SUBCUTANEOUS
Qty: 4 ML | Refills: 0 | Status: SHIPPED | OUTPATIENT
Start: 2025-07-09

## 2025-07-17 ENCOUNTER — TELEPHONE (OUTPATIENT)
Dept: FAMILY MEDICINE CLINIC | Facility: CLINIC | Age: 30
End: 2025-07-17

## 2025-07-17 DIAGNOSIS — E66.3 OVERWEIGHT (BMI 25.0-29.9): Primary | ICD-10-CM

## 2025-07-17 RX ORDER — TIRZEPATIDE 2.5 MG/.5ML
2.5 INJECTION, SOLUTION SUBCUTANEOUS WEEKLY
Qty: 2 ML | Refills: 0 | Status: SHIPPED | OUTPATIENT
Start: 2025-07-17 | End: 2025-08-08

## 2025-07-17 NOTE — TELEPHONE ENCOUNTER
Patient called stating at her visit with you recently she will start on zepbound. She said that she wouldn't be able to start it until 2 weeks after her hysterectomy anyway, but would like an auth submitted in the meantime so we have it for when it is time to start. If you are ok with that I can start the auth? Just wanted to verify dose/instructions first.

## 2025-08-02 DIAGNOSIS — E66.9 OBESITY (BMI 35.0-39.9 WITHOUT COMORBIDITY): ICD-10-CM

## 2025-08-04 ENCOUNTER — ANESTHESIA EVENT (OUTPATIENT)
Dept: PERIOP | Facility: HOSPITAL | Age: 30
End: 2025-08-04
Payer: COMMERCIAL

## 2025-08-04 ENCOUNTER — APPOINTMENT (OUTPATIENT)
Dept: LAB | Facility: HOSPITAL | Age: 30
End: 2025-08-04
Payer: COMMERCIAL

## 2025-08-04 DIAGNOSIS — N93.9 ABNORMAL UTERINE BLEEDING: ICD-10-CM

## 2025-08-04 DIAGNOSIS — Z01.818 PRE-OP TESTING: ICD-10-CM

## 2025-08-04 DIAGNOSIS — Z01.818 PREOP EXAMINATION: ICD-10-CM

## 2025-08-04 LAB
ANION GAP SERPL CALCULATED.3IONS-SCNC: 8 MMOL/L (ref 4–13)
BUN SERPL-MCNC: 10 MG/DL (ref 5–25)
CALCIUM SERPL-MCNC: 9 MG/DL (ref 8.4–10.2)
CHLORIDE SERPL-SCNC: 108 MMOL/L (ref 96–108)
CO2 SERPL-SCNC: 24 MMOL/L (ref 21–32)
CREAT SERPL-MCNC: 0.98 MG/DL (ref 0.6–1.3)
ERYTHROCYTE [DISTWIDTH] IN BLOOD BY AUTOMATED COUNT: 12.4 % (ref 11.6–15.1)
GFR SERPL CREATININE-BSD FRML MDRD: 78 ML/MIN/1.73SQ M
GLUCOSE SERPL-MCNC: 83 MG/DL (ref 65–140)
HCT VFR BLD AUTO: 37.8 % (ref 34.8–46.1)
HGB BLD-MCNC: 13.1 G/DL (ref 11.5–15.4)
MCH RBC QN AUTO: 31.2 PG (ref 26.8–34.3)
MCHC RBC AUTO-ENTMCNC: 34.7 G/DL (ref 31.4–37.4)
MCV RBC AUTO: 90 FL (ref 82–98)
PLATELET # BLD AUTO: 295 THOUSANDS/UL (ref 149–390)
PMV BLD AUTO: 9.4 FL (ref 8.9–12.7)
POTASSIUM SERPL-SCNC: 3.9 MMOL/L (ref 3.5–5.3)
RBC # BLD AUTO: 4.2 MILLION/UL (ref 3.81–5.12)
SODIUM SERPL-SCNC: 140 MMOL/L (ref 135–147)
TSH SERPL DL<=0.05 MIU/L-ACNC: 1.99 UIU/ML (ref 0.45–4.5)
WBC # BLD AUTO: 5.58 THOUSAND/UL (ref 4.31–10.16)

## 2025-08-04 PROCEDURE — 36415 COLL VENOUS BLD VENIPUNCTURE: CPT

## 2025-08-04 PROCEDURE — 84443 ASSAY THYROID STIM HORMONE: CPT

## 2025-08-04 PROCEDURE — 86850 RBC ANTIBODY SCREEN: CPT | Performed by: OBSTETRICS & GYNECOLOGY

## 2025-08-04 PROCEDURE — 80048 BASIC METABOLIC PNL TOTAL CA: CPT

## 2025-08-04 PROCEDURE — 85027 COMPLETE CBC AUTOMATED: CPT

## 2025-08-04 PROCEDURE — 86901 BLOOD TYPING SEROLOGIC RH(D): CPT | Performed by: OBSTETRICS & GYNECOLOGY

## 2025-08-04 PROCEDURE — 86900 BLOOD TYPING SEROLOGIC ABO: CPT | Performed by: OBSTETRICS & GYNECOLOGY

## 2025-08-04 RX ORDER — SEMAGLUTIDE 2.4 MG/.75ML
INJECTION, SOLUTION SUBCUTANEOUS
Qty: 4 ML | Refills: 0 | OUTPATIENT
Start: 2025-08-04

## 2025-08-05 ENCOUNTER — LAB REQUISITION (OUTPATIENT)
Dept: LAB | Facility: HOSPITAL | Age: 30
End: 2025-08-05
Payer: COMMERCIAL

## 2025-08-05 DIAGNOSIS — Z01.818 ENCOUNTER FOR OTHER PREPROCEDURAL EXAMINATION: ICD-10-CM

## 2025-08-05 LAB
ABO GROUP BLD: NORMAL
BLD GP AB SCN SERPL QL: NEGATIVE
RH BLD: POSITIVE
SPECIMEN EXPIRATION DATE: NORMAL

## 2025-08-08 ENCOUNTER — HOSPITAL ENCOUNTER (OUTPATIENT)
Facility: HOSPITAL | Age: 30
Setting detail: OUTPATIENT SURGERY
Discharge: HOME | End: 2025-08-08
Attending: OBSTETRICS & GYNECOLOGY | Admitting: OBSTETRICS & GYNECOLOGY
Payer: COMMERCIAL

## 2025-08-08 ENCOUNTER — ANESTHESIA (OUTPATIENT)
Dept: PERIOP | Facility: HOSPITAL | Age: 30
End: 2025-08-08
Payer: COMMERCIAL

## 2025-08-08 RX ORDER — KETAMINE HCL IN NACL, ISO-OSM 100MG/10ML
SYRINGE (ML) INJECTION AS NEEDED
Status: DISCONTINUED | OUTPATIENT
Start: 2025-08-08 | End: 2025-08-08

## 2025-08-08 RX ORDER — PHENYLEPHRINE HCL IN 0.9% NACL 1 MG/10 ML
SYRINGE (ML) INTRAVENOUS AS NEEDED
Status: DISCONTINUED | OUTPATIENT
Start: 2025-08-08 | End: 2025-08-08

## 2025-08-08 RX ORDER — ROCURONIUM BROMIDE 10 MG/ML
INJECTION, SOLUTION INTRAVENOUS AS NEEDED
Status: DISCONTINUED | OUTPATIENT
Start: 2025-08-08 | End: 2025-08-08

## 2025-08-08 RX ORDER — GLYCOPYRROLATE 0.2 MG/ML
INJECTION INTRAMUSCULAR; INTRAVENOUS AS NEEDED
Status: DISCONTINUED | OUTPATIENT
Start: 2025-08-08 | End: 2025-08-08

## 2025-08-08 RX ORDER — DEXAMETHASONE SODIUM PHOSPHATE 10 MG/ML
INJECTION, SOLUTION INTRAMUSCULAR; INTRAVENOUS AS NEEDED
Status: DISCONTINUED | OUTPATIENT
Start: 2025-08-08 | End: 2025-08-08

## 2025-08-08 RX ORDER — MIDAZOLAM HYDROCHLORIDE 2 MG/2ML
INJECTION, SOLUTION INTRAMUSCULAR; INTRAVENOUS AS NEEDED
Status: DISCONTINUED | OUTPATIENT
Start: 2025-08-08 | End: 2025-08-08

## 2025-08-08 RX ORDER — FENTANYL CITRATE 50 UG/ML
INJECTION, SOLUTION INTRAMUSCULAR; INTRAVENOUS AS NEEDED
Status: DISCONTINUED | OUTPATIENT
Start: 2025-08-08 | End: 2025-08-08

## 2025-08-08 RX ORDER — ONDANSETRON 2 MG/ML
INJECTION INTRAMUSCULAR; INTRAVENOUS AS NEEDED
Status: DISCONTINUED | OUTPATIENT
Start: 2025-08-08 | End: 2025-08-08

## 2025-08-08 RX ORDER — PROPOFOL 10 MG/ML
INJECTION, EMULSION INTRAVENOUS CONTINUOUS PRN
Status: DISCONTINUED | OUTPATIENT
Start: 2025-08-08 | End: 2025-08-08

## 2025-08-08 RX ORDER — HYDROMORPHONE HCL/PF 1 MG/ML
SYRINGE (ML) INJECTION AS NEEDED
Status: DISCONTINUED | OUTPATIENT
Start: 2025-08-08 | End: 2025-08-08

## 2025-08-08 RX ORDER — NEOSTIGMINE METHYLSULFATE 1 MG/ML
INJECTION INTRAVENOUS AS NEEDED
Status: DISCONTINUED | OUTPATIENT
Start: 2025-08-08 | End: 2025-08-08

## 2025-08-08 RX ORDER — ACETAMINOPHEN 10 MG/ML
INJECTION, SOLUTION INTRAVENOUS AS NEEDED
Status: DISCONTINUED | OUTPATIENT
Start: 2025-08-08 | End: 2025-08-08

## 2025-08-08 RX ADMIN — HYDROMORPHONE HYDROCHLORIDE 0.3 MG: 1 INJECTION, SOLUTION INTRAMUSCULAR; INTRAVENOUS; SUBCUTANEOUS at 14:56

## 2025-08-08 RX ADMIN — SODIUM CHLORIDE, SODIUM LACTATE, POTASSIUM CHLORIDE, AND CALCIUM CHLORIDE: .6; .31; .03; .02 INJECTION, SOLUTION INTRAVENOUS at 14:13

## 2025-08-08 RX ADMIN — PROPOFOL 200 MG: 10 INJECTION, EMULSION INTRAVENOUS at 13:24

## 2025-08-08 RX ADMIN — Medication 100 MCG: at 14:29

## 2025-08-08 RX ADMIN — CEFAZOLIN SODIUM 2000 MG: 2 SOLUTION INTRAVENOUS at 13:16

## 2025-08-08 RX ADMIN — NEOSTIGMINE 3 MG: 1 INJECTION INTRAVENOUS at 15:59

## 2025-08-08 RX ADMIN — Medication 25 MG: at 13:24

## 2025-08-08 RX ADMIN — HYDROMORPHONE HYDROCHLORIDE 0.3 MG: 1 INJECTION, SOLUTION INTRAMUSCULAR; INTRAVENOUS; SUBCUTANEOUS at 15:04

## 2025-08-08 RX ADMIN — GLYCOPYRROLATE 0.5 MG: 0.2 INJECTION INTRAMUSCULAR; INTRAVENOUS at 16:00

## 2025-08-08 RX ADMIN — FENTANYL CITRATE 50 MCG: 50 INJECTION INTRAMUSCULAR; INTRAVENOUS at 13:24

## 2025-08-08 RX ADMIN — MIDAZOLAM HYDROCHLORIDE 2 MG: 1 INJECTION, SOLUTION INTRAMUSCULAR; INTRAVENOUS at 13:15

## 2025-08-08 RX ADMIN — DEXAMETHASONE SODIUM PHOSPHATE 10 MG: 10 INJECTION INTRAMUSCULAR; INTRAVENOUS at 13:24

## 2025-08-08 RX ADMIN — PROPOFOL 120 MCG/KG/MIN: 10 INJECTION, EMULSION INTRAVENOUS at 13:27

## 2025-08-08 RX ADMIN — ONDANSETRON 4 MG: 2 INJECTION INTRAMUSCULAR; INTRAVENOUS at 15:55

## 2025-08-08 RX ADMIN — Medication 25 MG: at 14:19

## 2025-08-08 RX ADMIN — ROCURONIUM 50 MG: 50 INJECTION, SOLUTION INTRAVENOUS at 13:24

## 2025-08-08 RX ADMIN — GLYCOPYRROLATE 0.2 MG: 0.2 INJECTION INTRAMUSCULAR; INTRAVENOUS at 13:24

## 2025-08-08 RX ADMIN — FENTANYL CITRATE 50 MCG: 50 INJECTION INTRAMUSCULAR; INTRAVENOUS at 13:56

## 2025-08-08 RX ADMIN — ACETAMINOPHEN 1000 MG: 10 INJECTION INTRAVENOUS at 15:33

## 2025-08-08 RX ADMIN — HYDROMORPHONE HYDROCHLORIDE 0.4 MG: 1 INJECTION, SOLUTION INTRAMUSCULAR; INTRAVENOUS; SUBCUTANEOUS at 13:17

## 2025-08-12 ENCOUNTER — TELEPHONE (OUTPATIENT)
Dept: OBGYN CLINIC | Facility: CLINIC | Age: 30
End: 2025-08-12

## 2025-08-15 ENCOUNTER — TELEMEDICINE (OUTPATIENT)
Dept: OTHER | Facility: HOSPITAL | Age: 30
End: 2025-08-15
Payer: COMMERCIAL

## 2025-08-21 ENCOUNTER — TELEPHONE (OUTPATIENT)
Age: 30
End: 2025-08-21

## 2025-08-25 PROBLEM — N93.9 ABNORMAL UTERINE BLEEDING: Status: RESOLVED | Noted: 2025-01-13 | Resolved: 2025-08-25

## 2025-08-25 PROBLEM — Z90.710 S/P HYSTERECTOMY: Status: ACTIVE | Noted: 2025-08-25

## (undated) DEVICE — SUT VICRYL 3-0 CT-1 36 IN J944H

## (undated) DEVICE — DRESSING TELFA 2 X 3 IN STRL

## (undated) DEVICE — GLOVE PI ULTRA TOUCH SZ 6

## (undated) DEVICE — SKIN MARKER DUAL TIP WITH RULER CAP, FLEXIBLE RULER AND LABELS: Brand: DEVON

## (undated) DEVICE — GLOVE INDICATOR PI UNDERGLOVE SZ 6.5 BLUE

## (undated) DEVICE — 3M™ STERI-STRIP™ COMPOUND BENZOIN TINCTURE 40 BAGS/CARTON 4 CARTONS/CASE C1544: Brand: 3M™ STERI-STRIP™

## (undated) DEVICE — ABDOMINAL PAD: Brand: DERMACEA

## (undated) DEVICE — SUT VICRYL 0 CT-1 36 IN J946H

## (undated) DEVICE — CHLORAPREP HI-LITE 10.5ML ORANGE

## (undated) DEVICE — SCD SEQUENTIAL COMPRESSION COMFORT SLEEVE MEDIUM KNEE LENGTH: Brand: KENDALL SCD

## (undated) DEVICE — ENSEAL X1 TISSUE SEALER, CURVED JAW, 25 CM SHAFT LENGTH: Brand: ENSEAL

## (undated) DEVICE — SUT MONOCRYL 4-0 PS-2 27 IN Y426H

## (undated) DEVICE — SUT PLAIN 3-0 CT-1 27 IN 842H

## (undated) DEVICE — MEDI-VAC YANKAUER SUCTION HANDLE W/STRAIGHT TIP & CONTROL VENT: Brand: CARDINAL HEALTH

## (undated) DEVICE — PACK C-SECTION PBDS

## (undated) DEVICE — WOUND RETRACTOR AND PROTECTOR: Brand: ALEXIS O WOUND PROTECTOR-RETRACTOR